# Patient Record
Sex: MALE | Race: WHITE | NOT HISPANIC OR LATINO | Employment: FULL TIME | URBAN - METROPOLITAN AREA
[De-identification: names, ages, dates, MRNs, and addresses within clinical notes are randomized per-mention and may not be internally consistent; named-entity substitution may affect disease eponyms.]

---

## 2017-01-03 ENCOUNTER — OFFICE VISIT (OUTPATIENT)
Dept: FAMILY MEDICINE | Facility: CLINIC | Age: 37
End: 2017-01-03
Payer: COMMERCIAL

## 2017-01-03 VITALS
TEMPERATURE: 98 F | SYSTOLIC BLOOD PRESSURE: 130 MMHG | WEIGHT: 258 LBS | BODY MASS INDEX: 38.39 KG/M2 | DIASTOLIC BLOOD PRESSURE: 82 MMHG | HEART RATE: 82 BPM | OXYGEN SATURATION: 97 %

## 2017-01-03 DIAGNOSIS — F32.1 MAJOR DEPRESSIVE DISORDER, SINGLE EPISODE, MODERATE (H): Primary | ICD-10-CM

## 2017-01-03 DIAGNOSIS — Z13.220 SCREENING FOR CHOLESTEROL LEVEL: ICD-10-CM

## 2017-01-03 DIAGNOSIS — F52.32 ANORGASMIA OF MALE: ICD-10-CM

## 2017-01-03 PROCEDURE — 99213 OFFICE O/P EST LOW 20 MIN: CPT | Performed by: FAMILY MEDICINE

## 2017-01-03 RX ORDER — BUPROPION HYDROCHLORIDE 150 MG/1
150 TABLET ORAL EVERY MORNING
Qty: 30 TABLET | Refills: 1 | Status: SHIPPED | OUTPATIENT
Start: 2017-01-03 | End: 2017-03-06 | Stop reason: ALTCHOICE

## 2017-01-03 ASSESSMENT — ANXIETY QUESTIONNAIRES
GAD7 TOTAL SCORE: 7
7. FEELING AFRAID AS IF SOMETHING AWFUL MIGHT HAPPEN: SEVERAL DAYS
5. BEING SO RESTLESS THAT IT IS HARD TO SIT STILL: SEVERAL DAYS
1. FEELING NERVOUS, ANXIOUS, OR ON EDGE: SEVERAL DAYS
2. NOT BEING ABLE TO STOP OR CONTROL WORRYING: SEVERAL DAYS
6. BECOMING EASILY ANNOYED OR IRRITABLE: SEVERAL DAYS
3. WORRYING TOO MUCH ABOUT DIFFERENT THINGS: SEVERAL DAYS

## 2017-01-03 ASSESSMENT — PAIN SCALES - GENERAL: PAINLEVEL: NO PAIN (0)

## 2017-01-03 ASSESSMENT — PATIENT HEALTH QUESTIONNAIRE - PHQ9: 5. POOR APPETITE OR OVEREATING: SEVERAL DAYS

## 2017-01-03 NOTE — MR AVS SNAPSHOT
"              After Visit Summary   1/3/2017    Torres Cuevas    MRN: [id number]           Patient Information     Date Of Birth          1980        Visit Information        Provider Department      1/3/2017 3:40 PM Chepe Palm MD [facility]        Today's Diagnoses     Major depressive disorder, single episode, moderate (H)    -  1     Anorgasmia of male         Screening for cholesterol level            Follow-ups after your visit        Future tests that were ordered for you today     Open Future Orders        Priority Expected Expires Ordered    Lipid panel reflex to direct LDL Routine 1/4/2017 2/3/2017 1/3/2017            Who to contact     If you have questions or need follow up information about today's clinic visit or your schedule please contact [facility] directly at [phone].  Normal or non-critical lab and imaging results will be communicated to you by MyChart, letter or phone within 4 business days after the clinic has received the results. If you do not hear from us within 7 days, please contact the clinic through MyChart or phone. If you have a critical or abnormal lab result, we will notify you by phone as soon as possible.  Submit refill requests through FaceTags or call your pharmacy and they will forward the refill request to us. Please allow 3 business days for your refill to be completed.          Additional Information About Your Visit        MyChart Information     FaceTags lets you send messages to your doctor, view your test results, renew your prescriptions, schedule appointments and more. To sign up, go to [email or web address] . Click on \"Log in\" on the left side of the screen, which will take you to the Welcome page. Then click on \"Sign up Now\" on the right side of the page.     You will be asked to enter the access code listed below, as well as some personal information. Please follow the directions to create your " username and password.     Your access code is: [email or web address]  Expires: 2017  5:22 PM     Your access code will  in 90 days. If you need help or a new code, please call your [facility] or [phone].        Care EveryWhere ID     This is your Care EveryWhere ID. This could be used by other organizations to access your [place] medical records  [id number]        Your Vitals Were     Pulse Temperature Pulse Oximetry             82 98  F (36.7  C) (Oral) 97%          Blood Pressure from Last 3 Encounters:   17 130/82   16 125/84   16 130/87    Weight from Last 3 Encounters:   17 258 lb (117.028 kg)   16 257 lb (116.574 kg)   16 257 lb (116.574 kg)                 Today's Medication Changes          These changes are accurate as of: 1/3/17  4:34 PM.  If you have any questions, ask your nurse or doctor.               Start taking these medicines.        Dose/Directions    buPROPion 150 MG 24 hr tablet   Commonly known as:  WELLBUTRIN XL   Used for:  Major depressive disorder, single episode, moderate (H)   Replaces:  sertraline 50 MG tablet   Started by:  Chepe Palm MD        Dose:  150 mg   Take 1 tablet (150 mg) by mouth every morning   Quantity:  30 tablet   Refills:  1         Stop taking these medicines if you haven't already. Please contact your care team if you have questions.     sertraline 50 MG tablet   Commonly known as:  ZOLOFT   Replaced by:  buPROPion 150 MG 24 hr tablet   Stopped by:  Chepe Palm MD                Where to get your medicines      These medications were sent to [facility]/pharmacy #[id number] - [place], [place] - [address] AT CORNER OF [address] [place] [address], [facility] [address]     Phone:  [phone]    - buPROPion 150 MG 24 hr tablet             Primary Care Provider    None Specified       No primary provider on file.        Thank you!     Thank you for choosing [facility]  for your care. Our  goal is always to provide you with excellent care. Hearing back from our patients is one way we can continue to improve our services. Please take a few minutes to complete the written survey that you may receive in the mail after your visit with us. Thank you!             Your Updated Medication List - Protect others around you: Learn how to safely use, store and throw away your medicines at www.disposemymeds.org.          This list is accurate as of: 1/3/17  4:34 PM.  Always use your most recent med list.                   Brand Name Dispense Instructions for use    ALLEGRA PO      Take 180 mg by mouth daily as needed for allergies       buPROPion 150 MG 24 hr tablet    WELLBUTRIN XL    30 tablet    Take 1 tablet (150 mg) by mouth every morning       fluticasone 50 MCG/ACT spray    FLONASE     Spray 1 spray into both nostrils daily as needed for rhinitis or allergies       SUDAFED PO      Take 60 mg by mouth daily as needed for congestion

## 2017-01-03 NOTE — NURSING NOTE
"Chief Complaint   Patient presents with     Depression     4 week follow up       Initial /82 mmHg  Pulse 82  Temp(Src) 98  F (36.7  C) (Oral)  Wt 258 lb (117.028 kg)  SpO2 97% Estimated body mass index is 38.39 kg/(m^2) as calculated from the following:    Height as of 11/14/16: 5' 8.75\" (1.746 m).    Weight as of this encounter: 258 lb (117.028 kg).  BP completed using cuff size: Vijaya Montesinos MA      "

## 2017-01-03 NOTE — PROGRESS NOTES
SUBJECTIVE:                                                    Torres Cuevas is a 36 year old male who presents to clinic today for the following health issues:    4 week depression follow up    Problem list and histories reviewed & adjusted, as indicated.    Patient states he is doing well   He does feel that his depression has improved   He has normal erections but cannot reach orgasm     This is new for the patient   He read that this could be a side effect of the medication     Past Medical History   Diagnosis Date     Seasonal allergies        Past Surgical History   Procedure Laterality Date     No surgeries         Family History   Problem Relation Age of Onset     Liver Disease Paternal Grandfather      alcohol related most likely     Myocardial Infarction Maternal Grandfather      in his 60s       Social History   Substance Use Topics     Smoking status: Never Smoker      Smokeless tobacco: Never Used     Alcohol Use: Yes      Comment: 4 per week         Current Outpatient Prescriptions on File Prior to Visit:  Fexofenadine HCl (ALLEGRA PO) Take 180 mg by mouth daily as needed for allergies   fluticasone (FLONASE) 50 MCG/ACT nasal spray Spray 1 spray into both nostrils daily as needed for rhinitis or allergies   Pseudoephedrine HCl (SUDAFED PO) Take 60 mg by mouth daily as needed for congestion   [DISCONTINUED] sertraline (ZOLOFT) 50 MG tablet Take 1 tablet (50 mg) by mouth daily     No current facility-administered medications on file prior to visit.    O: /82 mmHg  Pulse 82  Temp(Src) 98  F (36.7  C) (Oral)  Wt 258 lb (117.028 kg)  SpO2 97%    Chest wall normal to inspection and palpation. Good excursion bilaterally. Lungs clear to auscultation. Good air movement bilaterally without rales, wheezes, or rhonchi.   Regular rate and  rhythm. S1 and S2 normal, no murmurs, clicks, gallops or rubs. No edema or JVD.              ICD-10-CM    1. Major depressive disorder, single episode, moderate (H)  F32.1 buPROPion (WELLBUTRIN XL) 150 MG 24 hr tablet   2. Anorgasmia of male F52.32    3. Screening for cholesterol level Z13.220 Lipid panel reflex to direct LDL     We discussed that all meds for depression can have this problem   I had originally discussed adding the wellbutrin, but he only wants to be on one drug.   Generic serzone may be a possibility   The brand name has been discontinued, I believe for live problems, so I am somewhat concerned about trying the generic     Total time spent face-to-face with the patient: 15 minutes.  Greater than 50% of time was spent in counseling.

## 2017-01-04 ENCOUNTER — TRANSFERRED RECORDS (OUTPATIENT)
Dept: HEALTH INFORMATION MANAGEMENT | Facility: CLINIC | Age: 37
End: 2017-01-04

## 2017-01-04 ASSESSMENT — ANXIETY QUESTIONNAIRES: GAD7 TOTAL SCORE: 7

## 2017-01-04 ASSESSMENT — PATIENT HEALTH QUESTIONNAIRE - PHQ9: SUM OF ALL RESPONSES TO PHQ QUESTIONS 1-9: 8

## 2017-02-06 ENCOUNTER — OFFICE VISIT (OUTPATIENT)
Dept: FAMILY MEDICINE | Facility: CLINIC | Age: 37
End: 2017-02-06
Payer: COMMERCIAL

## 2017-02-06 VITALS
WEIGHT: 249 LBS | SYSTOLIC BLOOD PRESSURE: 120 MMHG | BODY MASS INDEX: 37.05 KG/M2 | DIASTOLIC BLOOD PRESSURE: 84 MMHG | HEART RATE: 72 BPM

## 2017-02-06 DIAGNOSIS — F43.21 ADJUSTMENT DISORDER WITH DEPRESSED MOOD: Primary | ICD-10-CM

## 2017-02-06 DIAGNOSIS — Z13.220 SCREENING FOR CHOLESTEROL LEVEL: ICD-10-CM

## 2017-02-06 LAB
CHOLEST SERPL-MCNC: 257 MG/DL
HDLC SERPL-MCNC: 46 MG/DL
LDLC SERPL CALC-MCNC: 192 MG/DL
NONHDLC SERPL-MCNC: 211 MG/DL
TRIGL SERPL-MCNC: 97 MG/DL

## 2017-02-06 PROCEDURE — 80061 LIPID PANEL: CPT | Performed by: FAMILY MEDICINE

## 2017-02-06 PROCEDURE — 36415 COLL VENOUS BLD VENIPUNCTURE: CPT | Performed by: FAMILY MEDICINE

## 2017-02-06 PROCEDURE — 99213 OFFICE O/P EST LOW 20 MIN: CPT | Performed by: FAMILY MEDICINE

## 2017-02-06 RX ORDER — ESCITALOPRAM OXALATE 10 MG/1
10 TABLET ORAL DAILY
Qty: 30 TABLET | Refills: 1 | Status: SHIPPED | OUTPATIENT
Start: 2017-02-06 | End: 2017-03-06

## 2017-02-06 NOTE — PROGRESS NOTES
SUBJECTIVE:                                                    Torres Cuevas is a 36 year old male who presents to clinic today for the following health issues:    Follow up depression    Patient stopped his wellbutrin   Got shaky and irritable       Problem list and histories reviewed & adjusted, as indicated.      Past Medical History   Diagnosis Date     Seasonal allergies        Past Surgical History   Procedure Laterality Date     No surgeries         Family History   Problem Relation Age of Onset     Liver Disease Paternal Grandfather      alcohol related most likely     Myocardial Infarction Maternal Grandfather      in his 60s       Social History   Substance Use Topics     Smoking status: Never Smoker     Smokeless tobacco: Never Used     Alcohol use Yes      Comment: 4 per week     Ros: no chest pain, chest tightness   No sob   No leg edema   No abdominal pain     Denies fever or chills   Weight stable     Reviewed his PHQ-9     PHQ-9 score:    PHQ-9 SCORE 1/3/2017   Total Score 8       ICD-10-CM    1. Adjustment disorder with depressed mood F43.21 escitalopram (LEXAPRO) 10 MG tablet     Recheck in 6 mos   P wants to go to see a therapist and does not want to add medications

## 2017-02-06 NOTE — LETTER
[facility]   [address] [place]  [address]  [phone]                                   February 13, 2017    Torres Cuevas  [address] [facility] [address]        Dear Torres,    Your cholesterols are quite high.     A low cholesterol diet involves decreasing red meat meals to twice a week.  It means trying to avoid fried foods an excessive use of oils.  Certain oils are better than others.  Olive oil is better for you.  Cutting back on dairy, cheeses etc. helps.      You should consider going on to a medication to lower your cholesterol if these recommendations do not help within 6 months. Consider repeat testing in 6 months.       Results for orders placed or performed in visit on 02/06/17   Lipid panel reflex to direct LDL   Result Value Ref Range    Cholesterol 257 (H) <200 mg/dL    Triglycerides 97 <150 mg/dL    HDL Cholesterol 46 >39 mg/dL    LDL Cholesterol Calculated 192 (H) <100 mg/dL    Non HDL Cholesterol 211 (H) <130 mg/dL       If you have any questions please call the clinic at [phone]    Sincerely,    Chepe Palm MD  bmd

## 2017-02-06 NOTE — MR AVS SNAPSHOT
"              After Visit Summary   2017    Torres Cuevas    MRN: [id number]           Patient Information     Date Of Birth          1980        Visit Information        Provider Department      2017 2:00 PM Chepe Palm MD [facility]        Today's Diagnoses     Adjustment disorder with depressed mood    -  1        Follow-ups after your visit        Who to contact     If you have questions or need follow up information about today's clinic visit or your schedule please contact [facility] directly at [phone].  Normal or non-critical lab and imaging results will be communicated to you by AnaptysBiohart, letter or phone within 4 business days after the clinic has received the results. If you do not hear from us within 7 days, please contact the clinic through AnaptysBiohart or phone. If you have a critical or abnormal lab result, we will notify you by phone as soon as possible.  Submit refill requests through Limecraft or call your pharmacy and they will forward the refill request to us. Please allow 3 business days for your refill to be completed.          Additional Information About Your Visit        MyChart Information     Limecraft lets you send messages to your doctor, view your test results, renew your prescriptions, schedule appointments and more. To sign up, go to [email or web address] . Click on \"Log in\" on the left side of the screen, which will take you to the Welcome page. Then click on \"Sign up Now\" on the right side of the page.     You will be asked to enter the access code listed below, as well as some personal information. Please follow the directions to create your username and password.     Your access code is: [email or web address]  Expires: 2017  5:22 PM     Your access code will  in 90 days. If you need help or a new code, please call your [facility] or [phone].        Care EveryWhere ID     This is your Care EveryWhere ID. " This could be used by other organizations to access your [place] medical records  [id number]        Your Vitals Were     Pulse                   72            Blood Pressure from Last 3 Encounters:   02/06/17 120/84   01/03/17 130/82   12/06/16 125/84    Weight from Last 3 Encounters:   02/06/17 249 lb (112.946 kg)   01/03/17 258 lb (117.028 kg)   12/06/16 257 lb (116.574 kg)              Today, you had the following     No orders found for display         Today's Medication Changes          These changes are accurate as of: 2/6/17  2:40 PM.  If you have any questions, ask your nurse or doctor.               Start taking these medicines.        Dose/Directions    escitalopram 10 MG tablet   Commonly known as:  LEXAPRO   Used for:  Adjustment disorder with depressed mood   Started by:  Chepe Palm MD        Dose:  10 mg   Take 1 tablet (10 mg) by mouth daily   Quantity:  30 tablet   Refills:  1            Where to get your medicines      These medications were sent to [facility]/pharmacy #[id number] - [address] AT CORNER OF [address] [place] [address]     Phone:  [phone]    - escitalopram 10 MG tablet             Primary Care Provider    None Specified       No primary provider on file.        Thank you!     Thank you for choosing [facility]  for your care. Our goal is always to provide you with excellent care. Hearing back from our patients is one way we can continue to improve our services. Please take a few minutes to complete the written survey that you may receive in the mail after your visit with us. Thank you!             Your Updated Medication List - Protect others around you: Learn how to safely use, store and throw away your medicines at www.disposemymeds.org.          This list is accurate as of: 2/6/17  2:40 PM.  Always use your most recent med list.                   Brand Name Dispense Instructions for use    ALLEGRA PO      Take 180  mg by mouth daily as needed for allergies       buPROPion 150 MG 24 hr tablet    WELLBUTRIN XL    30 tablet    Take 1 tablet (150 mg) by mouth every morning       escitalopram 10 MG tablet    LEXAPRO    30 tablet    Take 1 tablet (10 mg) by mouth daily       fluticasone 50 MCG/ACT spray    FLONASE     Spray 1 spray into both nostrils daily as needed for rhinitis or allergies       SUDAFED PO      Take 60 mg by mouth daily as needed for congestion

## 2017-02-13 NOTE — PROGRESS NOTES
Your cholesterols are quite high.     A low cholesterol diet involves decreasing red meat meals to twice a week.  It means trying to avoid fried foods an excessive use of oils.  Certain oils are better than others.  Olive oil is better for you.  Cutting back on dairy, cheeses etc. helps.      You should consider going on to a medication to lower your cholesterol if these recommendations do not help within 6 months. Consider repeat testing in 6 months.

## 2017-03-06 ENCOUNTER — VIRTUAL VISIT (OUTPATIENT)
Dept: FAMILY MEDICINE | Facility: CLINIC | Age: 37
End: 2017-03-06
Payer: COMMERCIAL

## 2017-03-06 DIAGNOSIS — J31.0 CHRONIC RHINITIS: Primary | ICD-10-CM

## 2017-03-06 DIAGNOSIS — F43.21 ADJUSTMENT DISORDER WITH DEPRESSED MOOD: ICD-10-CM

## 2017-03-06 DIAGNOSIS — F32.5 MAJOR DEPRESSION IN COMPLETE REMISSION (H): ICD-10-CM

## 2017-03-06 PROCEDURE — 99441 ZZC PHYSICIAN TELEPHONE EVALUATION 5-10 MIN: CPT | Performed by: FAMILY MEDICINE

## 2017-03-06 RX ORDER — FLUTICASONE PROPIONATE 50 MCG
1-2 SPRAY, SUSPENSION (ML) NASAL DAILY
Qty: 1 BOTTLE | Refills: 11 | Status: SHIPPED | OUTPATIENT
Start: 2017-03-06 | End: 2023-01-10

## 2017-03-06 RX ORDER — ESCITALOPRAM OXALATE 10 MG/1
10 TABLET ORAL DAILY
Qty: 30 TABLET | Refills: 5 | Status: SHIPPED | OUTPATIENT
Start: 2017-03-06 | End: 2017-07-10

## 2017-03-06 NOTE — MR AVS SNAPSHOT
"              After Visit Summary   3/6/2017    Torres Cuevas    MRN: [id number]           Patient Information     Date Of Birth          1980        Visit Information        Provider Department      3/6/2017 3:40 PM Chepe Palm MD [facility]        Today's Diagnoses     Chronic rhinitis    -  1    Major depression in complete remission (H)        Adjustment disorder with depressed mood           Follow-ups after your visit        Who to contact     If you have questions or need follow up information about today's clinic visit or your schedule please contact [facility] directly at [phone].  Normal or non-critical lab and imaging results will be communicated to you by MyChart, letter or phone within 4 business days after the clinic has received the results. If you do not hear from us within 7 days, please contact the clinic through aitainmenthart or phone. If you have a critical or abnormal lab result, we will notify you by phone as soon as possible.  Submit refill requests through Quoteroller or call your pharmacy and they will forward the refill request to us. Please allow 3 business days for your refill to be completed.          Additional Information About Your Visit        MyChart Information     Quoteroller lets you send messages to your doctor, view your test results, renew your prescriptions, schedule appointments and more. To sign up, go to [email or web address] . Click on \"Log in\" on the left side of the screen, which will take you to the Welcome page. Then click on \"Sign up Now\" on the right side of the page.     You will be asked to enter the access code listed below, as well as some personal information. Please follow the directions to create your username and password.     Your access code is: [email or web address]  Expires: 2017  4:10 PM     Your access code will  in 90 days. If you need help or a new code, please call your [facility] or " [phone].        Care EveryWhere ID     This is your Care EveryWhere ID. This could be used by other organizations to access your [place] medical records  [id number]         Blood Pressure from Last 3 Encounters:   02/06/17 120/84   01/03/17 130/82   12/06/16 125/84    Weight from Last 3 Encounters:   02/06/17 249 lb (112.9 kg)   01/03/17 258 lb (117 kg)   12/06/16 257 lb (116.6 kg)              Today, you had the following     No orders found for display         Today's Medication Changes          These changes are accurate as of: 3/6/17  4:10 PM.  If you have any questions, ask your nurse or doctor.               These medicines have changed or have updated prescriptions.        Dose/Directions    * fluticasone 50 MCG/ACT spray   Commonly known as:  FLONASE   This may have changed:  Another medication with the same name was added. Make sure you understand how and when to take each.   Changed by:  Chepe Palm MD        Dose:  1 spray   Spray 1 spray into both nostrils daily as needed for rhinitis or allergies   Refills:  0       * fluticasone 50 MCG/ACT spray   Commonly known as:  FLONASE   This may have changed:  You were already taking a medication with the same name, and this prescription was added. Make sure you understand how and when to take each.   Used for:  Chronic rhinitis   Changed by:  Chepe Palm MD        Dose:  1-2 spray   Spray 1-2 sprays into both nostrils daily   Quantity:  1 Bottle   Refills:  11       * Notice:  This list has 2 medication(s) that are the same as other medications prescribed for you. Read the directions carefully, and ask your doctor or other care provider to review them with you.      Stop taking these medicines if you haven't already. Please contact your care team if you have questions.     buPROPion 150 MG 24 hr tablet   Commonly known as:  WELLBUTRIN XL   Stopped by:  Chepe Palm MD                Where to get your medicines      These  medications were sent to [facility]/pharmacy #[id number] - [place], [place] - [address] [facility], [facility] [address]     Phone:  [phone]     escitalopram 10 MG tablet    fluticasone 50 MCG/ACT spray                Primary Care Provider    None Specified       No primary provider on file.        Thank you!     Thank you for choosing [facility]  for your care. Our goal is always to provide you with excellent care. Hearing back from our patients is one way we can continue to improve our services. Please take a few minutes to complete the written survey that you may receive in the mail after your visit with us. Thank you!             Your Updated Medication List - Protect others around you: Learn how to safely use, store and throw away your medicines at www.disposemymeds.org.          This list is accurate as of: 3/6/17  4:10 PM.  Always use your most recent med list.                   Brand Name Dispense Instructions for use    ALLEGRA PO      Take 180 mg by mouth daily as needed for allergies       escitalopram 10 MG tablet    LEXAPRO    30 tablet    Take 1 tablet (10 mg) by mouth daily       * fluticasone 50 MCG/ACT spray    FLONASE     Spray 1 spray into both nostrils daily as needed for rhinitis or allergies       * fluticasone 50 MCG/ACT spray    FLONASE    1 Bottle    Spray 1-2 sprays into both nostrils daily       SUDAFED PO      Take 60 mg by mouth daily as needed for congestion       * Notice:  This list has 2 medication(s) that are the same as other medications prescribed for you. Read the directions carefully, and ask your doctor or other care provider to review them with you.

## 2017-03-06 NOTE — PROGRESS NOTES
"Torres Cuevas is a 36 year old male who is being evaluated via a telephone visit.      The patient has been notified of following:     \"This telephone visit will be conducted via a call between you and your physician/provider. We have found that certain health care needs can be provided without the need for a physical exam.  This service lets us provide the care you need with a short phone conversation.  If a prescription is necessary we can send it directly to your pharmacy.  If lab work is needed we can place an order for that and you can then stop by our lab to have the test done at a later time.    We will bill your insurance company for this service.  Please check with your medical insurance if this type of visit is covered. You may be responsible for the cost of this type of visit if insurance coverage is denied.  The typical cost is $30 (10min), $59(11-20min) and $85 (21-30min).  Most often these visits are shorter than 10 minutes.    If during the course of the call the physician/provider feels a telephone visit is not appropriate, you will not be charged for this service. \"     Consent has been obtained for this service by 1 care team member:yes. See the scanned image in the medical record.    Preferred patient phone number to be used for this call: [phone]     Torres Cuevas complains of follow up on depression.    Past Medical History   Diagnosis Date     Seasonal allergies       Social History     Social History     Marital status: Single     Spouse name: N/A     Number of children: N/A     Years of education: N/A     Occupational History     Not on file.     Social History Main Topics     Smoking status: Never Smoker     Smokeless tobacco: Never Used     Alcohol use Yes      Comment: 4 per week     Drug use: No     Sexual activity: Yes     Partners: Female     Other Topics Concern     Parent/Sibling W/ Cabg, Mi Or Angioplasty Before 65f 55m? No     Social History Narrative     ALLERGIES  Seasonal " allergies     Patient started on new med ; lexapro in the last month   Previous med was wellbutrin     PhQ-9 today is 2     No Side effects from meds :     Seems to be working     No sedation   He is not having trouble sleeping   He sleeps about 9 hours     He  Wakes up and he is not tired     Jitteriness has gone after the wellbutrin     Patient drinks alcohol 2 times a week   He is not having problems       ICD-10-CM    1. Chronic rhinitis J31.0 fluticasone (FLONASE) 50 MCG/ACT spray   2. Major depression in complete remission (H) F32.5    3. Adjustment disorder with depressed mood F43.21 escitalopram (LEXAPRO) 10 MG tablet       Patient is doing well off the wellbutrin and is in remission on lexapro   He has no side effects   He can contact us in 6 months and repeat his phq-9 over the phone and if this remains in remission then we can renew his meds for another 6 months.     Follow up up in the office in 1 year                 [facility]      Additional provider notes:    Assessment/Plan:  No diagnosis found.    Total time spent on this phone visit with the patient = 5 minutes     I have reviewed the note as documented above.  This accurately captures the substance of my conversation with the patient,        No vitals were done due to Telephone visit.

## 2017-03-07 ASSESSMENT — PATIENT HEALTH QUESTIONNAIRE - PHQ9: SUM OF ALL RESPONSES TO PHQ QUESTIONS 1-9: 2

## 2017-06-05 ENCOUNTER — TELEPHONE (OUTPATIENT)
Dept: FAMILY MEDICINE | Facility: CLINIC | Age: 37
End: 2017-06-05

## 2017-06-05 DIAGNOSIS — F32.4 MAJOR DEPRESSIVE DISORDER WITH SINGLE EPISODE, IN PARTIAL REMISSION (H): Primary | ICD-10-CM

## 2017-06-05 NOTE — LETTER
My Depression Action Plan  Name: Torres Cuevas   Date of Birth 1980  Date: 7/10/2017    My doctor: No primary care provider on file.   My clinic: [address] [id number] [phone]          GREEN    ZONE   Good Control    What it looks like:     Things are going generally well. You have normal up s and down s. You may even feel depressed from time to time, but bad moods usually last less than a day.   What you need to do:  1. Continue to care for yourself (see self care plan)  2. Check your depression survival kit and update it as needed  3. Follow your physician s recommendations including any medication.  4. Do not stop taking medication unless you consult with your physician first.           YELLOW         ZONE Getting Worse    What it looks like:     Depression is starting to interfere with your life.     It may be hard to get out of bed; you may be starting to isolate yourself from others.    Symptoms of depression are starting to last most all day and this has happened for several days.     You may have suicidal thoughts but they are not constant.   What you need to do:     1. Call your care team, your response to treatment will improve if you keep your care team informed of your progress. Yellow periods are signs an adjustment may need to be made.     2. Continue your self-care, even if you have to fake it!    3. Talk to someone in your support network    4. Open up your depression survival kit           RED    ZONE Medical Alert - Get Help    What it looks like:     Depression is seriously interfering with your life.     You may experience these or other symptoms: You can t get out of bed most days, can t work or engage in other necessary activities, you have trouble taking care of basic hygiene, or basic responsibilities, thoughts of suicide or death that will not go away, self-injurious behavior.     What you  need to do:  1. Call your care team and request a same-day appointment. If they are not available (weekends or after hours) call your local crisis line, emergency room or 911.      Electronically signed by: Yoselin Chamberlain, July 10, 2017    Depression Self Care Plan / Survival Kit    Self-Care for Depression  Here s the deal. Your body and mind are really not as separate as most people think.  What you do and think affects how you feel and how you feel influences what you do and think. This means if you do things that people who feel good do, it will help you feel better.  Sometimes this is all it takes.  There is also a place for medication and therapy depending on how severe your depression is, so be sure to consult with your medical provider and/ or Behavioral Health Consultant if your symptoms are worsening or not improving.     In order to better manage my stress, I will:    Exercise  Get some form of exercise, every day. This will help reduce pain and release endorphins, the  feel good  chemicals in your brain. This is almost as good as taking antidepressants!  This is not the same as joining a gym and then never going! (they count on that by the way ) It can be as simple as just going for a walk or doing some gardening, anything that will get you moving.      Hygiene   Maintain good hygiene (Get out of bed in the morning, Make your bed, Brush your teeth, Take a shower, and Get dressed like you were going to work, even if you are unemployed).  If your clothes don't fit try to get ones that do.    Diet  I will strive to eat foods that are good for me, drink plenty of water, and avoid excessive sugar, caffeine, alcohol, and other mood-altering substances.  Some foods that are helpful in depression are: complex carbohydrates, B vitamins, flaxseed, fish or fish oil, fresh fruits and vegetables.    Psychotherapy  I agree to participate in Individual Therapy (if recommended).    Medication  If prescribed medications,  I agree to take them.  Missing doses can result in serious side effects.  I understand that drinking alcohol, or other illicit drug use, may cause potential side effects.  I will not stop my medication abruptly without first discussing it with my provider.    Staying Connected With Others  I will stay in touch with my friends, family members, and my primary care provider/team.    Use your imagination  Be creative.  We all have a creative side; it doesn t matter if it s oil painting, sand castles, or mud pies! This will also kick up the endorphins.    Witness Beauty  (AKA stop and smell the roses) Take a look outside, even in mid-winter. Notice colors, textures. Watch the squirrels and birds.     Service to others  Be of service to others.  There is always someone else in need.  By helping others we can  get out of ourselves  and remember the really important things.  This also provides opportunities for practicing all the other parts of the program.    Humor  Laugh and be silly!  Adjust your TV habits for less news and crime-drama and more comedy.    Control your stress  Try breathing deep, massage therapy, biofeedback, and meditation. Find time to relax each day.     My support system    Clinic Contact:  Phone number:    Contact 1:  Phone number:    Contact 2:  Phone number:    Mu-ism/:  Phone number:    Therapist:  Phone number:    Local crisis center:    Phone number:    Other community support:  Phone number:

## 2017-06-05 NOTE — LETTER
June 5, 2017    Torres Cuevas  [address]  [place] [address]    Dear Torres    We care about your health and have reviewed your health plan. We have reviewed your medical conditions, medication list, and lab results and are making recommendations based on this review, to better manage your health.    You are in particular need of attention regarding:  - Your Depression      Here is a list of Health Maintenance topics that are due now or due soon:  Health Maintenance Due   Topic Date Due     DEPRESSION ACTION PLAN Q1 YR  1980     We will be calling you in the next couple of weeks to help you schedule any appointments that are needed.  Please call us at [phone] (or use [employer]) to address the above recommendations.     Thank you for trusting [facility] and we appreciate the opportunity to serve you.  We look forward to supporting your healthcare needs in the future.    Healthy Regards,    Your [facility] Care Team/nr

## 2017-06-05 NOTE — TELEPHONE ENCOUNTER
Panel Management Review      Patient has the following on his problem list:     Depression / Dysthymia review  PHQ-9 SCORE 12/6/2016 1/3/2017 3/6/2017   Total Score 7 8 2      Patient is due for:  DAP      Composite cancer screening  Chart review shows that this patient is due/due soon for the following None  Summary:    Patient is due/failing the following:   DAP and FOLLOW UP    Action needed:   Patient needs office visit for mood. and DAP    Type of outreach:    Phone, left message for patient to call back.  and Sent letter.    Questions for provider review:    None                                                                                                                                    Yoselin Chamberlain [facility]        Chart routed to Care Team .

## 2017-07-10 DIAGNOSIS — F43.21 ADJUSTMENT DISORDER WITH DEPRESSED MOOD: ICD-10-CM

## 2017-07-10 NOTE — TELEPHONE ENCOUNTER
escitalopram (LEXAPRO) 10 MG tablet     Last Written Prescription Date: 3-6-17  Last Fill Quantity: 30, # refills: 5  Last Office Visit with G primary care provider:  3-6-17        Last PHQ-9 score on record=   PHQ-9 SCORE 7/10/2017   Total Score 5

## 2017-07-10 NOTE — TELEPHONE ENCOUNTER
Spoke with the patient and updated hi PHQ-9 as per the follow up instructions from Dr. Palm. Sent DAP to patient and routed to Dr. CAGLE as a FYI. Patient also requested a refill of his medication and that was t'ed up and sent to the provider as well. No other questions from the patient at this time.

## 2017-07-11 RX ORDER — ESCITALOPRAM OXALATE 10 MG/1
10 TABLET ORAL DAILY
Qty: 30 TABLET | Refills: 5 | Status: SHIPPED | OUTPATIENT
Start: 2017-07-11 | End: 2017-10-27

## 2017-07-11 ASSESSMENT — PATIENT HEALTH QUESTIONNAIRE - PHQ9: SUM OF ALL RESPONSES TO PHQ QUESTIONS 1-9: 5

## 2017-07-11 NOTE — TELEPHONE ENCOUNTER
Prescription approved per [facility] Refill Protocol.    Criss Lora RN  [facility]

## 2017-10-27 ENCOUNTER — TELEPHONE (OUTPATIENT)
Dept: FAMILY MEDICINE | Facility: CLINIC | Age: 37
End: 2017-10-27

## 2017-10-27 DIAGNOSIS — F43.21 ADJUSTMENT DISORDER WITH DEPRESSED MOOD: ICD-10-CM

## 2017-10-27 RX ORDER — ESCITALOPRAM OXALATE 20 MG/1
20 TABLET ORAL DAILY
Qty: 30 TABLET | Refills: 1 | Status: SHIPPED | OUTPATIENT
Start: 2017-10-27 | End: 2018-01-03

## 2017-10-27 ASSESSMENT — PATIENT HEALTH QUESTIONNAIRE - PHQ9: SUM OF ALL RESPONSES TO PHQ QUESTIONS 1-9: 11

## 2017-10-27 NOTE — TELEPHONE ENCOUNTER
I have increased the dose to 20 mg per patient wishes   Please document a current PHQ9 in the chart

## 2017-10-27 NOTE — TELEPHONE ENCOUNTER
"Called and spoke with patient.  About a couple weeks ago he was feeling \"pretty yoselyn\" and took one double dose to see if it would improve his mood and it did - the 20mg is working better than the 10mg did.  He's wondering if it would be d/t him being a \"bigger renate.\"      No thoughts of harming self/others.    Would provider be willing to increase to 20mg?    Routed to provider.    Criss Lora RN  [facility]        "

## 2017-10-27 NOTE — TELEPHONE ENCOUNTER
Reason for Call:  Other prescription    Detailed comments: Patient is wanting to discuss doubling his dose of escitalopram (LEXAPRO) 10 MG tablet. Please call to let him know if he can do this without an appointment.    Phone Number Patient can be reached at: Home number on file [phone] (home)    Best Time: anytime    Can we leave a detailed message on this number? YES    Call taken on 10/27/2017 at 3:51 PM by Jonathan Killian

## 2017-10-27 NOTE — TELEPHONE ENCOUNTER
Called and spoke with patient, completed PHQ-9.    PHQ-9 SCORE 3/6/2017 7/10/2017 10/27/2017   Total Score 2 5 11         He thinks the increase dose will help his symptoms.  RN advised if he has any issues to call back to clinic.    Criss Lora RN  [facility]

## 2018-01-03 ENCOUNTER — OFFICE VISIT (OUTPATIENT)
Dept: PSYCHOLOGY | Facility: CLINIC | Age: 38
End: 2018-01-03
Payer: COMMERCIAL

## 2018-01-03 ENCOUNTER — TELEPHONE (OUTPATIENT)
Dept: FAMILY MEDICINE | Facility: CLINIC | Age: 38
End: 2018-01-03

## 2018-01-03 DIAGNOSIS — F33.0 MAJOR DEPRESSIVE DISORDER, RECURRENT EPISODE, MILD WITH ANXIOUS DISTRESS (H): Primary | ICD-10-CM

## 2018-01-03 DIAGNOSIS — F43.21 ADJUSTMENT DISORDER WITH DEPRESSED MOOD: ICD-10-CM

## 2018-01-03 PROCEDURE — 90791 PSYCH DIAGNOSTIC EVALUATION: CPT | Performed by: SOCIAL WORKER

## 2018-01-03 ASSESSMENT — ANXIETY QUESTIONNAIRES
7. FEELING AFRAID AS IF SOMETHING AWFUL MIGHT HAPPEN: SEVERAL DAYS
1. FEELING NERVOUS, ANXIOUS, OR ON EDGE: SEVERAL DAYS
IF YOU CHECKED OFF ANY PROBLEMS ON THIS QUESTIONNAIRE, HOW DIFFICULT HAVE THESE PROBLEMS MADE IT FOR YOU TO DO YOUR WORK, TAKE CARE OF THINGS AT HOME, OR GET ALONG WITH OTHER PEOPLE: SOMEWHAT DIFFICULT
GAD7 TOTAL SCORE: 7
2. NOT BEING ABLE TO STOP OR CONTROL WORRYING: SEVERAL DAYS
3. WORRYING TOO MUCH ABOUT DIFFERENT THINGS: SEVERAL DAYS
5. BEING SO RESTLESS THAT IT IS HARD TO SIT STILL: NOT AT ALL
6. BECOMING EASILY ANNOYED OR IRRITABLE: SEVERAL DAYS

## 2018-01-03 ASSESSMENT — PATIENT HEALTH QUESTIONNAIRE - PHQ9
5. POOR APPETITE OR OVEREATING: MORE THAN HALF THE DAYS
SUM OF ALL RESPONSES TO PHQ QUESTIONS 1-9: 10

## 2018-01-03 NOTE — TELEPHONE ENCOUNTER
Requested Prescriptions   Pending Prescriptions Disp Refills     escitalopram (LEXAPRO) 20 MG tablet [Pharmacy Med Name: ESCITALOPRAM 20 MG TABLET] 30 tablet 1    Last Written Prescription Date:  10-27-17  Last Fill Quantity: 30,  # refills: 1   Last Office Visit with [facility], [facility] or [facility] prescribing provider:  2-6-17   Future Office Visit:      Sig: TAKE 1 TABLET (20 MG) BY MOUTH DAILY    SSRIs Protocol Failed    1/3/2018  1:00 AM       Failed - PHQ-9 score less than 5 in past 6 months    The PHQ-9 criteria is meant to fail. It requires a PHQ-9 score review         Failed - Recent (6 mo) or future visit with authorizing provider's specialty    Patient had office visit in the last 6 months or has a visit in the next 30 days with authorizing provider.  See chart review.            Passed - Recent or future visit with authorizing provider    Patient had office visit in the last year or has a visit in the next 30 days with authorizing provider.  See chart review.              Passed - Patient is age 18 or older

## 2018-01-03 NOTE — LETTER
Torres,    You are do for a depression screening.  Please call us at [phone] so we may complete this over the phone.    Zee Block RN [facility] Triage.

## 2018-01-03 NOTE — MR AVS SNAPSHOT
"                  MRN:[id number]                      After Visit Summary   1/3/2018    Torres Cuevas    MRN: [id number]           Visit Information        Provider Department      1/3/2018 3:00 PM Hermilo Borja [facility] Generic      Your next 10 appointments already scheduled     2018  8:30 AM CST   [facility]    [address] [place] [id number]   [phone]            2018  1:00 PM CST   Return Visit with Hermilo Borja [facility]    [address] [facility] [address]   [phone]              MyChart Information     [employer] lets you send messages to your doctor, view your test results, renew your prescriptions, schedule appointments and more. To sign up, go to [email or web address] . Click on \"Log in\" on the left side of the screen, which will take you to the Welcome page. Then click on \"Sign up Now\" on the right side of the page.     You will be asked to enter the access code listed below, as well as some personal information. Please follow the directions to create your username and password.     Your access code is: [email or web address]  Expires: 2018  2:32 PM     Your access code will  in 90 days. If you need help or a new code, please call your [place] clinic or [phone].        Care EveryWhere ID     This is your Care EveryWhere ID. This could be used by other organizations to access your [place] medical records  [id number]        Equal Access to Services     RAMESH MANLEY : Hadii jeison Andrew, waaxda lurodney, qaybterrence kaalchester cole, olga corea. So [facility] [phone].    ATENCIÓN: Si habla español, tiene a camacho disposición servicios gratuitos de asistencia lingüística. Llame al " [phone].    We comply with applicable federal civil rights laws and [place] laws. We do not discriminate on the basis of race, color, national origin, age, disability, sex, sexual orientation, or gender identity.

## 2018-01-03 NOTE — PROGRESS NOTES
Adult Intake Structured Interview  Standard Diagnostic Assessment      CLIENT'S NAME: Torres Cuevas  MRN:   [id number]  :   1980  ACCT. NUMBER: [id number]  DATE OF SERVICE: 18      Identifying Information:  Client is a 37 year old, ,  male. Client was referred for counseling by self. Client is currently employed full time. Client attended the session alone.       Client's Statement of Presenting Concern:  Client reports the reason for seeking therapy at this time as increased anger and more emotional issues.  Have relationship issues in marriage.  Increased depressive symptoms and anxiety.   Client stated that his symptoms have resulted in the following functional impairments: relationship(s), self-care and social interactions      History of Presenting Concern:  Client reports that these problem(s) began more intensely in last two month. Client has attempted to resolve these concerns in the past through counseling and medications. Client reports that other professional(s) are not involved in providing support / services.     Social History:  Client reported he grew up in [place], [place]. They were the third born of 3 children. This is an intact family and parents remain . Client reported that his childhood was good. Some difficulty as a child and in middle school with peer relationships and he stated that he was an unusual child and liked weird things but got better in high school.   Client described his current relationships with family of origin as close to one sister and strained currently with oldest sibling.     Client reported a history of 1 committed relationships or marriages. Client has been  for five months but together with partner for 5 years. Client reported having 0 children. Client  identified some stable and meaningful social connections. Client reported that he has not been involved with the legal system.  Client's highest education level was some college. Client did identify the following learning problems: concentration and listening skills. There are no ethnic, cultural or Mormon factors that may be relevant for therapy. Client identified his preferred language to be English. Client reported he does not need the assistance of an  or other support involved in therapy. Modifications will not be used to assist communication in therapy. Client did not serve in the .     Client reports family history includes Liver Disease in his paternal grandfather; Myocardial Infarction in his maternal grandfather.    Mental Health History:  Client reported no family history of mental health issues.  Client previously received the following mental health diagnosis: Anxiety and Depression.  Client has received the following mental health services in the past: counseling and medication(s) from physician / PCP.  Hospitalizations: None.  Client is not currently receiving any mental health services.      Chemical Health History:  Client reported the following biological family members or relatives with chemical health issues: Aunt reportedly used alcohol , Maternal Grandfather reportedly used alcohol , Uncle reportedly used alcohol . Client has not received chemical dependency treatment in the past. Client is not currently receiving any chemical dependency treatment. Client reports no problems as a result of their drinking / drug use.      Client Reports:  Client reports using alcohol 3 times per week and has 2 beers and glasses of wine at a time. Client first started drinking at age 22.  Client denies using tobacco.  Client reports using marijuana 1 times per week and smokes 1 at a time. Client started using marijuana at age 35.  Client reports using caffeine 2 times per day and drinks 1 at  a time. Client started using caffeine at age 22.  Client denies using street drugs.  Client denies the non-medical use of prescription or over the counter drugs.    CAGE: None of the patient's responses to the CAGE screening were positive / Negative CAGE score   Based on the negative Cage-Aid score and clinical interview there  are not indications of drug or alcohol abuse.    Discussed the general effects of drugs and alcohol on health and well-being. Therapist gave client printed information about the effects of chemical use on his health and well being.      Significant Losses / Trauma / Abuse / Neglect Issues:  There are indications or report of significant loss, trauma, abuse or neglect issues related to: death of close friend when 26 years old in 2007.  Friend was killed by a drunk .    Issues of possible neglect are not present.      Medical Issues:  Client has had a physical exam to rule out medical causes for current symptoms. Date of last physical exam was within the past year. Client was encouraged to follow up with PCP if symptoms were to develop. The client does not have an established [facility] Care Provider.  He was encouraged to establish one at the [facility].. The client reports not having a psychiatrist. Client reports no current medical concerns. The client denies the presence of chronic or episodic pain. There are not significant nutritional concerns. Client would like to lose weight and will at times emotionally eat when stressed or emotional.    Client reports current meds as:   Current Outpatient Prescriptions   Medication Sig     escitalopram (LEXAPRO) 20 MG tablet Take 1 tablet (20 mg) by mouth daily     fluticasone (FLONASE) 50 MCG/ACT spray Spray 1-2 sprays into both nostrils daily     Fexofenadine HCl (ALLEGRA PO) Take 180 mg by mouth daily as needed for allergies     fluticasone (FLONASE) 50 MCG/ACT nasal spray Spray 1 spray into both nostrils daily as  needed for rhinitis or allergies     Pseudoephedrine HCl (SUDAFED PO) Take 60 mg by mouth daily as needed for congestion     [DISCONTINUED] sertraline (ZOLOFT) 50 MG tablet Take 1 tablet (50 mg) by mouth daily     No current facility-administered medications for this visit.        Client Allergies:  Allergies   Allergen Reactions     Seasonal Allergies      no known allergies to medications    Medical History:  Past Medical History:   Diagnosis Date     Seasonal allergies          Medication Adherence:  Client reports taking prescribed medications as prescribed.    Client was provided recommendation to follow-up with prescribing physician.    Mental Status Assessment:  Appearance:   Appropriate   Eye Contact:   Good   Psychomotor Behavior: Restless   Attitude:   Cooperative   Orientation:   All  Speech   Rate / Production: Normal    Volume:  Normal   Mood:    Anxious  Depressed  Sad   Affect:    Expansive  Worrisome   Thought Content:  Referential Thinking   Thought Form:  Coherent  Logical  Circumstantial  Insight:    Fair       Review of Symptoms:  Depression: Sleep Interest Guilt Energy Concentration Appetite Psychomotor slowing or agitation Irritability  Katherine:  No symptoms  Psychosis: No symptoms  Anxiety: Worries Nervousness Describe: about relationship and regulating emotions   Panic:  No symptoms  Post Traumatic Stress Disorder: No symptoms  Obsessive Compulsive Disorder: No symptoms  Eating Disorder: No symptoms  Oppositional Defiant Disorder: Lose Temper Blaming Angry  ADD / ADHD: Attention Listening  Conduct Disorder: No symptoms      Safety Assessment:    History of Safety Concerns:   Client denied a history of suicidal ideation.    Client denied a history of suicide attempts.    Client denied a history of homicidal ideation.    Client denied a history of self-injurious ideation and behaviors.    Client denied a history of personal safety concerns.    Client denied a history of assaultive behaviors.         Current Safety Concerns:  Client denies current suicidal ideation.    Client denies current homicidal ideation and behaviors.  Client denies current self-injurious ideation and behaviors.    Client denies current concerns for personal safety.      Client reports there are no firearms in the house.     Plan for Safety and Risk Management:  A safety and risk management plan has not been developed at this time, however client was given the after-hours number / 911 should there be a change in any of these risk factors.    Client's Strengths and Limitations:  Client identified the following strengths or resources that will help him succeed in counseling: commitment to health and well being, friends / good social support, family support and sense of humor. Client identified the following supports: family and friends. Things that may interfere with the client's success in counseling include: lack of commitment.      Diagnostic Criteria:  A) Recurrent episode(s) - symptoms have been present during the same 2-week period and represent a change from previous functioning 5 or more symptoms (required for diagnosis)   - Depressed mood. Note: In children and adolescents, can be irritable mood.     - Diminished interest or pleasure in all, or almost all, activities.    - Increased sleep.    - Psychomotor activity agitation.    - Fatigue or loss of energy.    - Feelings of worthlessness or inappropriate and excessive guilt.    - Diminished ability to think or concentrate, or indecisiveness.   B) The symptoms cause clinically significant distress or impairment in social, occupational, or other important areas of functioning  C) The episode is not attributable to the physiological effects of a substance or to another medical condition  D) The occurence of major depressive episode is not better explained by other thought / psychotic disorders  E) There has never been a manic episode or hypomanic episode      Functional  Status:  Client's symptoms have caused reduced functional status in the following areas: Activities of Daily Living - completion of daily tasks  Social / Relational - relationship issues with partner      DSM5 Diagnoses: (Sustained by DSM5 Criteria Listed Above)  Diagnoses: 296.31 (F33.0) Major Depressive Disorder, Recurrent Episode, Mild _ and With anxious distress  Psychosocial & Contextual Factors: relationship stress, little interest in doing things, anger regulation issues, financial stress  WHODAS 2.0 (12 item)            This questionnaire asks about difficulties due to health conditions. Health conditions  include  disease or illnesses, other health problems that may be short or long lasting,  injuries, mental health or emotional problems, and problems with alcohol or drugs.                     Think back over the past 30 days and answer these questions, thinking about how much  difficulty you had doing the following activities. For each question, please [employer] [place] only  one response.    S1 Standing for long periods such as 30 minutes? Mild =           2   S2 Taking care of household responsibilities? Moderate =   3   S3 Learning a new task, for example, learning how to get to a new place? Mild =           2   S4 How much of a problem do you have joining community activities (for example, festivals, [employer]ondo[employer] or other activities) in the same way as anyone else can? Mild =           2   S5 How much have you been emotionally affected by your health problems? Moderate =   3     In the past 30 days, how much difficulty did you have in:   S6 Concentrating on doing something for ten minutes? Moderate =   3   S7 Walking a long distance such as a kilometer (or equivalent)? Mild =           2   S8 Washing your whole body? Mild =           2   S9 Getting dressed? Mild =           2   S10 Dealing with people you do not know? Mild =           2   S11 Maintaining a friendship? Mild =           2   S12 Your day to day work?  Mild =           2     H1 Overall, in the past 30 days, how many days were these difficulties present? Record number of days 15   H2 In the past 30 days, for how many days were you totally unable to carry out your usual activities or work because of any health condition? Record number of days  9   H3 In the past 30 days, not counting the days that you were totally unable, for how many days did you cut back or reduce your usual activities or work because of any health condition? Record number of days 15     Attendance Agreement:  Client has signed Attendance Agreement:Yes      Collaboration:  Collaboration with other professionals is not indicated at this time. Client was asked to establish a PCP that he feels comfortable with so we can coordinate care in the future.       Preliminary Treatment Plan:  The client reports no currently identified Oriental orthodox, ethnic or cultural issues relevant to therapy.     services are not indicated.    Modifications to assist communication are not indicated.    The concerns identified by the client will be addressed in therapy.    Initial Treatment will focus on: Depressed Mood - Decrease depression symptoms and return to normal daily functioning  Anxiety - Alleviate anxiety and return to normal daily functioning.    As a preliminary treatment goal, client will experience a reduction in depressed mood, will develop more effective coping skills to manage depressive symptoms, will develop healthy cognitive patterns and beliefs, will increase ability to function adaptively and will continue to take medications as prescribed / participate in supportive activities and services  and will experience a reduction in anxiety, will develop more effective coping skills to manage anxiety symptoms, will develop healthy cognitive patterns and beliefs and will increase ability to function adaptively.    The focus of initial interventions will be to alleviate anxiety, alleviate depressed  mood, facilitate appropriate expression of feelings, increase coping skills, increase self esteem, provide homework to reinforce skill development, teach CBT skills, teach communication skills and anger regulation.    Referral to another professional/service is not indicated at this time..    A Release of Information is not needed at this time.    Report to child / adult protection services was NA.    Client will have access to their [facility]' medical record.    TORY Carcamo  January 3, 2018

## 2018-01-04 ASSESSMENT — ANXIETY QUESTIONNAIRES: GAD7 TOTAL SCORE: 7

## 2018-01-05 RX ORDER — ESCITALOPRAM OXALATE 20 MG/1
TABLET ORAL
Qty: 30 TABLET | Refills: 1 | Status: SHIPPED | OUTPATIENT
Start: 2018-01-05 | End: 2018-11-05

## 2018-01-05 NOTE — TELEPHONE ENCOUNTER
Routing refill request to provider for review/approval because:  Elevated PHQ-9 score > 5    Criss Lora RN  [facility]

## 2018-01-11 NOTE — TELEPHONE ENCOUNTER
Called patient at [phone] (home)  Left message on vociemail to return phone call to triage.  Zee Block RN [facility] Triage

## 2018-01-15 NOTE — TELEPHONE ENCOUNTER
Called patient at [phone] (home). Left message on voicemail to return phone call to triage.  Zee Block RN [facility] Triage.

## 2018-01-16 ENCOUNTER — OFFICE VISIT (OUTPATIENT)
Dept: PSYCHOLOGY | Facility: CLINIC | Age: 38
End: 2018-01-16
Payer: COMMERCIAL

## 2018-01-16 DIAGNOSIS — F33.0 MAJOR DEPRESSIVE DISORDER, RECURRENT EPISODE, MILD WITH ANXIOUS DISTRESS (H): Primary | ICD-10-CM

## 2018-01-16 PROCEDURE — 90834 PSYTX W PT 45 MINUTES: CPT | Performed by: SOCIAL WORKER

## 2018-01-16 NOTE — PROGRESS NOTES
"                                             Progress Note    Client Name: Torres Cuevas  Date: 1-16-18         Service Type: Individual      Session Start Time: 8:30  Session End Time: 9:15      Session Length: 45     Session #: 2     Attendees: Client    Treatment Plan Last Reviewed: Started tx plan today 1-16-18  PHQ-9 / KANDY-7 : Complete next session     DATA      Progress Since Last Session (Related to Symptoms / Goals / Homework):   Symptoms: Stable    Homework: Did not complete-Worked on tx plan in session today.  Client will engage in thought stopping process to develop awareness about when he starts to feel anger or irritability. Work on positive communication skills with wife, engage in positive communication sessions with her weekly and use \"I\" statements.  Continue to use mindfulness HOANG when client needs to regulate his mood.       Episode of Care Goals: Minimal progress - PREPARATION (Decided to change - considering how); Intervened by negotiating a change plan and determining options / strategies for behavior change, identifying triggers, exploring social supports, and working towards setting a date to begin behavior change     Current / Ongoing Stressors and Concerns:              relationship stress, little interest in doing things, anger regulation issues, financial stress      Treatment Objective(s) Addressed in This Session:   use thought-stopping strategy daily to reduce intrusive thoughts  Worked on treatment plan goals in session     Intervention:   Thought stopping process, effective communication skills         ASSESSMENT: Current Emotional / Mental Status (status of significant symptoms):   Risk status (Self / Other harm or suicidal ideation)   Client denies current fears or concerns for personal safety.   Client denies current or recent suicidal ideation or behaviors.   Client denies current or recent homicidal ideation or behaviors.   Client denies current or recent self injurious " "behavior or ideation.   Client denies other safety concerns.   A safety and risk management plan has not been developed at this time, however client was given the after-hours number / 911 should there be a change in any of these risk factors.     Appearance:   Appropriate    Eye Contact:   Good    Psychomotor Behavior: Normal    Attitude:   Cooperative    Orientation:   All   Speech    Rate / Production: Normal     Volume:  Normal    Mood:    Anxious  Depressed    Affect:    Appropriate  Bright  Expansive    Thought Content:  Referential Thinking    Thought Form:  Coherent  Goal Directed  Circumstantial   Insight:    Fair      Medication Review:   No changes to current psychiatric medication(s)     Medication Compliance:   Yes     Changes in Health Issues:   None reported     Chemical Use Review:   Substance Use: Chemical use reviewed, no active concerns identified      Tobacco Use: No current tobacco use.       Collateral Reports Completed:   Not Applicable    PLAN: (Client Tasks / Therapist Tasks / Other) Worked on tx plan in session today.  Client will engage in thought stopping process to develop awareness about when he starts to feel anger or irritability. Work on positive communication skills with wife, engage in positive communication sessions with her weekly and use \"I\" statements.  Continue to use mindfulness HOANG when client needs to regulate his mood. Scheduled additional counseling sessions.         ELIZABETH Carcamo                                                         ________________________________________________________________________    Treatment Plan    Client's Name: Torres Cuevas  YOB: 1980    Date: 1-16-18    DSM-V Diagnoses:  296.31 (F33.0) Major Depressive Disorder, Recurrent Episode, Mild _ and With anxious distress  Psychosocial & Contextual Factors: relationship stress, little interest in doing things, anger regulation issues, financial stress    WHODAS: Completed " first session    Referral / Collaboration:  Referral to another professional/service is not indicated at this time..    Anticipated number of session or this episode of care: 15      MeasurableTreatment Goal(s) related to diagnosis / functional impairment(s)  Goal 1: Client will decrease symptoms of depression and anxiety and return to normal daily functioning    I will know I've met my goal when I feel better about myself and my relationship and have less anger in my life.      Objective #A (Client Action)    Client will use cognitive strategies identified in therapy to challenge anxious thoughts and depressive thoughts.  Status: Continued - Date(s):1-16-18     Intervention(s)  Therapist will teach thought stopping process and CBT skills to regulate his mood.  Client will practice daily until it becomes automatic to regulate his mood better. .    Objective #B  Client will improve his overall self esteem and mood.  Status: Continued - Date(s):     Intervention(s)  Therapist will assign homework Complete self esteem schedule and work on areas that need improvement.  Client will concentrate on engaging in activities that improve his self esteem and write down what he accomplishes each day to improve his self esteem. .    Objective #C  Client will learn effective ways to regulate his anger..  Status: Continued - Date(s):     Intervention(s)  Therapist will assign homework Client will Learn the HEALS technique to regulate his anger daily or as needed. .        Client has reviewed and agreed to the above plan.      Hermilo Borja [facility]  January 16, 2018

## 2018-01-16 NOTE — TELEPHONE ENCOUNTER
Patient has not returned call to triage.  Mailed a letter to patient to call us so we may complete his PHQ 9 over the phone.    Zee Block RN [facility] Triage.

## 2018-01-16 NOTE — MR AVS SNAPSHOT
"                  MRN:[id number]                      After Visit Summary   2018    Torres Cuevas    MRN: [id number]           Visit Information        Provider Department      2018 8:30 AM BryanHermilo maldonado [facility] Generic      Your next 10 appointments already scheduled     2018  1:00 PM CST   Return Visit with Hermilo Borja [facility]    [address] [place] [id number]   [phone]            2018  1:00 PM CST   Return Visit with Hermilo Borja [facility]    [address] [facility] [address]   [phone]              MyChart Information     Cellcrypthart lets you send messages to your doctor, view your test results, renew your prescriptions, schedule appointments and more. To sign up, go to [email or web address] . Click on \"Log in\" on the left side of the screen, which will take you to the Welcome page. Then click on \"Sign up Now\" on the right side of the page.     You will be asked to enter the access code listed below, as well as some personal information. Please follow the directions to create your username and password.     Your access code is: [email or web address]  Expires: 2018  2:32 PM     Your access code will  in 90 days. If you need help or a new code, please call your [facility] [facility] clinic or [phone].        Care EveryWhere ID     This is your Care EveryWhere ID. This could be used by other organizations to access your [facility] [facility] medical records  [id number]        Equal Access to Services     RAMESH MANLEY : Hadii jeison velez Sodayna, waaxda luqadaha, qaybta kaalmada kelsey, olga corea. So [facility] [phone].    ATENCIÓN: Si habla español, tiene a camacho disposición servicios gratuitos de asistencia lingüística. Llame al " [phone].    We comply with applicable federal civil rights laws and [place] laws. We do not discriminate on the basis of race, color, national origin, age, disability, sex, sexual orientation, or gender identity.

## 2018-01-18 ENCOUNTER — TELEPHONE (OUTPATIENT)
Dept: FAMILY MEDICINE | Facility: CLINIC | Age: 38
End: 2018-01-18

## 2018-01-18 NOTE — TELEPHONE ENCOUNTER
Reason for Call:  Other     Detailed comments: patient is calling to talk with RN on team to do a depression screening.  Please call patient.    Phone Number Patient can be reached at: Home number on file [phone] (home)    Best Time: any    Can we leave a detailed message on this number? YES    Call taken on 1/18/2018 at 12:18 PM by Jalyn Granger

## 2018-01-18 NOTE — TELEPHONE ENCOUNTER
Left message for patient ([phone]) to call back to nurse line. Phone number provided.    Federico Smith RN

## 2018-01-19 NOTE — TELEPHONE ENCOUNTER
See provider plan from 1/3/18 phone encounter:    Chepe Palm MD       12:44 PM   Note      I have renewed his prescriptions, but his PHQ-9 numbers seem to be going up.  Make sure he is doing well with his depression, otherwise he may need to be rechecked.  If situational with the holidays or other stresses ok to wait to be seen.   Next visit he should be seen         PHQ-9 score:    PHQ-9 SCORE 1/3/2018   Total Score 10     So, patient does not need new PHQ9, just need to see if the elevated score earlier this month was more situational, is he doing better now?   Could repeat PHQ9 if patient agreeable.    Attempted to call patient at home number, left message on answering service requesting call back to clinic to discuss.  Teresa Galan RN  [facility]

## 2018-01-22 ASSESSMENT — PATIENT HEALTH QUESTIONNAIRE - PHQ9: SUM OF ALL RESPONSES TO PHQ QUESTIONS 1-9: 2

## 2018-01-22 NOTE — TELEPHONE ENCOUNTER
PHQ-9 score:    PHQ-9 SCORE 1/3/2018   Total Score 10           I called patient, he states he stopped his lexapro a couple weeks ago due to the sexual side effects.   He is seeing Hermilo for psych/talk therapy and thinks he is doing well.      Repeated phone phq9:    PHQ-9 score:    PHQ-9 SCORE 1/22/2018   Total Score 2       He states he is feeling more energetic, likes how things are going currently.    I advised it will be one year since last face to face visit in February.   He plans to continue with psych, will call to see PCP if wants to discuss meds again.    Routed to provider as TOMY.    Teresa Galan RN  [facility]

## 2018-01-22 NOTE — TELEPHONE ENCOUNTER
I have reviewed the note from the nurse.  OK to hold meds for now and just to with talk therapy.

## 2018-01-30 ENCOUNTER — OFFICE VISIT (OUTPATIENT)
Dept: PSYCHOLOGY | Facility: CLINIC | Age: 38
End: 2018-01-30
Payer: COMMERCIAL

## 2018-01-30 DIAGNOSIS — F33.0 MAJOR DEPRESSIVE DISORDER, RECURRENT EPISODE, MILD WITH ANXIOUS DISTRESS (H): Primary | ICD-10-CM

## 2018-01-30 PROCEDURE — 90834 PSYTX W PT 45 MINUTES: CPT | Performed by: SOCIAL WORKER

## 2018-01-30 ASSESSMENT — ANXIETY QUESTIONNAIRES
2. NOT BEING ABLE TO STOP OR CONTROL WORRYING: SEVERAL DAYS
5. BEING SO RESTLESS THAT IT IS HARD TO SIT STILL: SEVERAL DAYS
GAD7 TOTAL SCORE: 9
7. FEELING AFRAID AS IF SOMETHING AWFUL MIGHT HAPPEN: SEVERAL DAYS
1. FEELING NERVOUS, ANXIOUS, OR ON EDGE: SEVERAL DAYS
6. BECOMING EASILY ANNOYED OR IRRITABLE: MORE THAN HALF THE DAYS
3. WORRYING TOO MUCH ABOUT DIFFERENT THINGS: SEVERAL DAYS
IF YOU CHECKED OFF ANY PROBLEMS ON THIS QUESTIONNAIRE, HOW DIFFICULT HAVE THESE PROBLEMS MADE IT FOR YOU TO DO YOUR WORK, TAKE CARE OF THINGS AT HOME, OR GET ALONG WITH OTHER PEOPLE: SOMEWHAT DIFFICULT

## 2018-01-30 ASSESSMENT — PATIENT HEALTH QUESTIONNAIRE - PHQ9: 5. POOR APPETITE OR OVEREATING: MORE THAN HALF THE DAYS

## 2018-01-30 NOTE — MR AVS SNAPSHOT
"                  MRN:[id number]                      After Visit Summary   2018    Torres Cuevas    MRN: [id number]           Visit Information        Provider Department      2018 1:00 PM Hermilo Borja [facility] Generic      Your next 10 appointments already scheduled     2018  1:00 PM CST   Return Visit with Hermilo Borja [facility])    [address] [facility] [address]   [phone]            2018  4:00 PM CST   Return Visit with Hermilo Borja [facility] ([facility])    [address] [address]   [phone]              MyChart Information     [employer] lets you send messages to your doctor, view your test results, renew your prescriptions, schedule appointments and more. To sign up, go to [email or web address] . Click on \"Log in\" on the left side of the screen, which will take you to the Welcome page. Then click on \"Sign up Now\" on the right side of the page.     You will be asked to enter the access code listed below, as well as some personal information. Please follow the directions to create your username and password.     Your access code is: [email or web address]  Expires: 2018  2:32 PM     Your access code will  in 90 days. If you need help or a new code, please call your [place] clinic or [phone].        Care EveryWhere ID     This is your Care EveryWhere ID. This could be used by other organizations to access your [place] medical records  [id number]        Equal Access to Services     PRETTY MANLEY : Hadii aad sahra velez Sodayna, waaxda luqadaha, qaybta kaalmada adekevinyada, olga nelson . So [facility] [phone].    ATENCIÓN: Si habla español, tiene a camacho disposición servicios gratuitos de asistencia lingüística. Llame al " [phone].    We comply with applicable federal civil rights laws and [place] laws. We do not discriminate on the basis of race, color, national origin, age, disability, sex, sexual orientation, or gender identity.

## 2018-01-31 ASSESSMENT — ANXIETY QUESTIONNAIRES: GAD7 TOTAL SCORE: 9

## 2018-01-31 ASSESSMENT — PATIENT HEALTH QUESTIONNAIRE - PHQ9: SUM OF ALL RESPONSES TO PHQ QUESTIONS 1-9: 15

## 2018-01-31 NOTE — PROGRESS NOTES
"                                             Progress Note    Client Name: Torres Cuevas  Date: 1-30-18         Service Type: Individual      Session Start Time: 1:00  Session End Time: 1:45      Session Length: 45     Session #: 3     Attendees: Client    Treatment Plan Last Reviewed: Started tx plan today 1-16-18  PHQ-9 / KANDY-7 : Completed this session     DATA      Progress Since Last Session (Related to Symptoms / Goals / Homework):   Symptoms: Worsening PHQ9 and KANDY scores have increased    Homework: Did not complete-Worked on tx plan in session today.  Client will engage in thought stopping process to develop awareness about when he starts to feel anger or irritability. Work on positive communication skills with wife, engage in positive communication sessions with her weekly and use \"I\" statements.  Continue to use mindfulness HOANG when client needs to regulate his mood. Client will start documenting and journaling feelings and thoughts in a journal and concentrate on strengths and 3 positives or things that he accomplishes each day and what bring him edward.  Client having difficulty in his relationship and continuing to share with his wife how he feels and thinks about issues in his marriage.  Continue to engage in activities that distract him form negative feelings and thoughts.      Episode of Care Goals: Minimal progress - PREPARATION (Decided to change - considering how); Intervened by negotiating a change plan and determining options / strategies for behavior change, identifying triggers, exploring social supports, and working towards setting a date to begin behavior change     Current / Ongoing Stressors and Concerns:              relationship stress, little interest in doing things, anger regulation issues, financial stress      Treatment Objective(s) Addressed in This Session:   use thought-stopping strategy daily to reduce intrusive thoughts  Worked on treatment plan goals in session  Journal thoughts " "and feelings, strengths and accomplishments each day and what brings him edward and happiness each day   Intervention:   Thought stopping process, effective communication skills    Journaling, distraction activities, self care activities     ASSESSMENT: Current Emotional / Mental Status (status of significant symptoms):   Risk status (Self / Other harm or suicidal ideation)   Client denies current fears or concerns for personal safety.   Client denies current or recent suicidal ideation or behaviors.   Client denies current or recent homicidal ideation or behaviors.   Client denies current or recent self injurious behavior or ideation.   Client denies other safety concerns.   A safety and risk management plan has not been developed at this time, however client was given the after-hours number / 911 should there be a change in any of these risk factors.     Appearance:   Appropriate    Eye Contact:   Good    Psychomotor Behavior: Normal    Attitude:   Cooperative    Orientation:   All   Speech    Rate / Production: Normal     Volume:  Normal    Mood:    Anxious  Depressed    Affect:    Blunted  Flat  Subdued    Thought Content:  Referential Thinking    Thought Form:  Goal Directed  Circumstantial   Insight:    Fair      Medication Review:   No changes to current psychiatric medication(s)     Medication Compliance:   Yes     Changes in Health Issues:   None reported     Chemical Use Review:   Substance Use: Chemical use reviewed, no active concerns identified      Tobacco Use: No current tobacco use.       Collateral Reports Completed:   Not Applicable    PLAN: (Client Tasks / Therapist Tasks / Other) Worked on tx plan in session today.  Client will engage in thought stopping process to develop awareness about when he starts to feel anger or irritability. Work on positive communication skills with wife, engage in positive communication sessions with her weekly and use \"I\" statements.  Continue to use mindfulness HOANG when " client needs to regulate his mood. Scheduled additional counseling sessions. Client will start documenting and journaling feelings and thoughts in a journal and concentrate on strengths and 3 positives or things that he accomplishes each day and what bring him edward.  Client having difficulty in his relationship and continuing to share with his wife how he feels and thinks about issues in his marriage.  Continue to engage in activities that distract him form negative feelings and thoughts. Concentrate on good self care activities that improve his mood.           Hermilo Borja, [facility]                                                         ________________________________________________________________________    Treatment Plan    Client's Name: Torres Cuevas  YOB: 1980    Date: 1-16-18    DSM-V Diagnoses:  296.31 (F33.0) Major Depressive Disorder, Recurrent Episode, Mild _ and With anxious distress  Psychosocial & Contextual Factors: relationship stress, little interest in doing things, anger regulation issues, financial stress    WHODAS: Completed first session    Referral / Collaboration:  Referral to another professional/service is not indicated at this time..    Anticipated number of session or this episode of care: 15      MeasurableTreatment Goal(s) related to diagnosis / functional impairment(s)  Goal 1: Client will decrease symptoms of depression and anxiety and return to normal daily functioning    I will know I've met my goal when I feel better about myself and my relationship and have less anger in my life.      Objective #A (Client Action)    Client will use cognitive strategies identified in therapy to challenge anxious thoughts and depressive thoughts.  Status: Continued - Date(s):1-16-18     Intervention(s)  Therapist will teach thought stopping process and CBT skills to regulate his mood.  Client will practice daily until it becomes automatic to regulate his mood better.  .    Objective #B  Client will improve his overall self esteem and mood.  Status: Continued - Date(s):     Intervention(s)  Therapist will assign homework Complete self esteem schedule and work on areas that need improvement.  Client will concentrate on engaging in activities that improve his self esteem and write down what he accomplishes each day to improve his self esteem. .    Objective #C  Client will learn effective ways to regulate his anger..  Status: Continued - Date(s):     Intervention(s)  Therapist will assign homework Client will Learn the HEALS technique to regulate his anger daily or as needed. .        Client has reviewed and agreed to the above plan.      Hermilo Borja, [facility]  January 16, 2018

## 2018-02-13 ENCOUNTER — OFFICE VISIT (OUTPATIENT)
Dept: PSYCHOLOGY | Facility: CLINIC | Age: 38
End: 2018-02-13
Payer: COMMERCIAL

## 2018-02-13 DIAGNOSIS — F33.0 MAJOR DEPRESSIVE DISORDER, RECURRENT EPISODE, MILD WITH ANXIOUS DISTRESS (H): Primary | ICD-10-CM

## 2018-02-13 PROCEDURE — 90834 PSYTX W PT 45 MINUTES: CPT | Performed by: SOCIAL WORKER

## 2018-02-13 NOTE — MR AVS SNAPSHOT
"                  MRN:[id number]                      After Visit Summary   2018    Torres Cuevas    MRN: [id number]           Visit Information        Provider Department      2018 1:00 PM Hermilo Borja [facility] Generic      Your next 10 appointments already scheduled     2018  4:00 PM CST   Return Visit with Hermilo Borja [facility]    [address] [place] [address]   [phone]            Mar 15, 2018  2:30 PM CDT   Return Visit with Hermilo Borja [facility]    [address] [facility] [address]   [phone]              MyChart Information     [employer] lets you send messages to your doctor, view your test results, renew your prescriptions, schedule appointments and more. To sign up, go to [email or web address] . Click on \"Log in\" on the left side of the screen, which will take you to the Welcome page. Then click on \"Sign up Now\" on the right side of the page.     You will be asked to enter the access code listed below, as well as some personal information. Please follow the directions to create your username and password.     Your access code is: [email or web address]  Expires: 2018  2:32 PM     Your access code will  in 90 days. If you need help or a new code, please call your [place] clinic or [phone].        Care EveryWhere ID     This is your Care EveryWhere ID. This could be used by other organizations to access your [place] medical records  [id number]        Equal Access to Services     PRETTY MANLEY : Hadii aad sahra velez Sodayna, waaxda luqadaha, qaybta kaalmada adekevinyada, olga nelson . So [facility] [phone].    ATENCIÓN: Si habla español, tiene a camacho disposición servicios gratuitos de asistencia lingüística. Llame al " [phone].    We comply with applicable federal civil rights laws and [place] laws. We do not discriminate on the basis of race, color, national origin, age, disability, sex, sexual orientation, or gender identity.

## 2018-02-13 NOTE — PROGRESS NOTES
"                                             Progress Note    Client Name: Torres Cuevas  Date: 2-13-18         Service Type: Individual      Session Start Time: 1:00  Session End Time: 1:45      Session Length: 45     Session #: 4     Attendees: Client    Treatment Plan Last Reviewed: Started tx plan today 1-16-18  PHQ-9 / KANDY-7 : Complete next session     DATA      Progress Since Last Session (Related to Symptoms / Goals / Homework):   Symptoms: Stable    Homework: Achieved / completed to satisfactionPrevious sessions: Worked on tx plan in session today.  Client will engage in thought stopping process to develop awareness about when he starts to feel anger or irritability. Work on positive communication skills with wife, engage in positive communication sessions with her weekly and use \"I\" statements.  Continue to use mindfulness HOANG when client needs to regulate his mood. Client will start documenting and journaling feelings and thoughts in a journal and concentrate on strengths and 3 positives or things that he accomplishes each day and what bring him edward.  Client having difficulty in his relationship and continuing to share with his wife how he feels and thinks about issues in his marriage.  Continue to engage in activities that distract him form negative feelings and thoughts.Current session: Client is communicating better with his partner, they have scheduled couple's counseling.  Client is on a weight loss program and is feeling better about this and is also working on regulating his anger better and concentrating on positives about self and things that he accomplishes and journaling when he needs too.         Episode of Care Goals: Minimal progress - PREPARATION (Decided to change - considering how); Intervened by negotiating a change plan and determining options / strategies for behavior change, identifying triggers, exploring social supports, and working towards setting a date to begin behavior " change     Current / Ongoing Stressors and Concerns:              relationship stress, little interest in doing things, anger regulation issues, financial stress      Treatment Objective(s) Addressed in This Session:   use thought-stopping strategy daily to reduce intrusive thoughts  Worked on treatment plan goals in session  Journal thoughts and feelings, strengths and accomplishments each day and what brings him edward and happiness each day   Intervention:   Thought stopping process, effective communication skills    Journaling, distraction activities, self care activities  Anger regulation   ASSESSMENT: Current Emotional / Mental Status (status of significant symptoms):   Risk status (Self / Other harm or suicidal ideation)   Client denies current fears or concerns for personal safety.   Client denies current or recent suicidal ideation or behaviors.   Client denies current or recent homicidal ideation or behaviors.   Client denies current or recent self injurious behavior or ideation.   Client denies other safety concerns.   A safety and risk management plan has not been developed at this time, however client was given the after-hours number / 911 should there be a change in any of these risk factors.     Appearance:   Appropriate    Eye Contact:   Good    Psychomotor Behavior: Normal    Attitude:   Cooperative    Orientation:   All   Speech    Rate / Production: Normal     Volume:  Normal    Mood:    Depressed    Affect:    Blunted  Flat  Subdued    Thought Content:  Referential Thinking    Thought Form:  Goal Directed    Insight:    Fair      Medication Review:   No changes to current psychiatric medication(s)     Medication Compliance:   Yes     Changes in Health Issues:   None reported     Chemical Use Review:   Substance Use: Chemical use reviewed, no active concerns identified      Tobacco Use: No current tobacco use.       Collateral Reports Completed:   Not Applicable    PLAN: (Client Tasks / Therapist  "Tasks / Other) Past sessions: Worked on tx plan in session today.  Client will engage in thought stopping process to develop awareness about when he starts to feel anger or irritability. Work on positive communication skills with wife, engage in positive communication sessions with her weekly and use \"I\" statements.  Continue to use mindfulness HOANG when client needs to regulate his mood. Scheduled additional counseling sessions. Client will start documenting and journaling feelings and thoughts in a journal and concentrate on strengths and 3 positives or things that he accomplishes each day and what bring him edward.  Client having difficulty in his relationship and continuing to share with his wife how he feels and thinks about issues in his marriage.  Continue to engage in activities that distract him form negative feelings and thoughts. Concentrate on good self care activities that improve his mood.Current session:  Client is communicating better with his partner, they have scheduled couple's counseling.  Client is on a weight loss program and is feeling better about this and is also working on regulating his anger better and concentrating on positives about self and things that he accomplishes and journaling when he needs too.           Hermilo Borja, [facility]                                                         ________________________________________________________________________    Treatment Plan    Client's Name: Torres Cuevas  YOB: 1980    Date: 1-16-18    DSM-V Diagnoses:  296.31 (F33.0) Major Depressive Disorder, Recurrent Episode, Mild _ and With anxious distress  Psychosocial & Contextual Factors: relationship stress, little interest in doing things, anger regulation issues, financial stress    WHODAS: Completed first session    Referral / Collaboration:  Referral to another professional/service is not indicated at this time..    Anticipated number of session or this episode of care: " 15      MeasurableTreatment Goal(s) related to diagnosis / functional impairment(s)  Goal 1: Client will decrease symptoms of depression and anxiety and return to normal daily functioning    I will know I've met my goal when I feel better about myself and my relationship and have less anger in my life.      Objective #A (Client Action)    Client will use cognitive strategies identified in therapy to challenge anxious thoughts and depressive thoughts.  Status: Continued - Date(s):1-16-18     Intervention(s)  Therapist will teach thought stopping process and CBT skills to regulate his mood.  Client will practice daily until it becomes automatic to regulate his mood better. .    Objective #B  Client will improve his overall self esteem and mood.  Status: Continued - Date(s): 1-16-18    Intervention(s)  Therapist will assign homework Complete self esteem schedule and work on areas that need improvement.  Client will concentrate on engaging in activities that improve his self esteem and write down what he accomplishes each day to improve his self esteem. .    Objective #C  Client will learn effective ways to regulate his anger..  Status: Continued - Date(s): 1-16-18     Intervention(s)  Therapist will assign homework Client will Learn the HEALS technique to regulate his anger daily or as needed. .        Client has reviewed and agreed to the above plan.      Hermilo Borja, [facility]  January 16, 2018

## 2018-02-21 ENCOUNTER — TELEPHONE (OUTPATIENT)
Dept: FAMILY MEDICINE | Facility: CLINIC | Age: 38
End: 2018-02-21

## 2018-02-21 NOTE — TELEPHONE ENCOUNTER
Reason for Call:  Other     Detailed comments: Patient calling and is looking to do couples counseling with his wife. Patient is wondering if there is anyone that Hermilo recommends that they see.    Phone Number Patient can be reached at: Home number on file [phone] (home)    Best Time: any    Can we leave a detailed message on this number? YES    Call taken on 2/21/2018 at 12:40 PM by Celeste Israel

## 2018-02-27 ENCOUNTER — OFFICE VISIT (OUTPATIENT)
Dept: PSYCHOLOGY | Facility: CLINIC | Age: 38
End: 2018-02-27
Payer: COMMERCIAL

## 2018-02-27 DIAGNOSIS — F33.0 MAJOR DEPRESSIVE DISORDER, RECURRENT EPISODE, MILD WITH ANXIOUS DISTRESS (H): Primary | ICD-10-CM

## 2018-02-27 PROCEDURE — 90834 PSYTX W PT 45 MINUTES: CPT | Performed by: SOCIAL WORKER

## 2018-02-27 ASSESSMENT — ANXIETY QUESTIONNAIRES
1. FEELING NERVOUS, ANXIOUS, OR ON EDGE: SEVERAL DAYS
2. NOT BEING ABLE TO STOP OR CONTROL WORRYING: SEVERAL DAYS
5. BEING SO RESTLESS THAT IT IS HARD TO SIT STILL: NOT AT ALL
3. WORRYING TOO MUCH ABOUT DIFFERENT THINGS: SEVERAL DAYS
6. BECOMING EASILY ANNOYED OR IRRITABLE: NOT AT ALL
IF YOU CHECKED OFF ANY PROBLEMS ON THIS QUESTIONNAIRE, HOW DIFFICULT HAVE THESE PROBLEMS MADE IT FOR YOU TO DO YOUR WORK, TAKE CARE OF THINGS AT HOME, OR GET ALONG WITH OTHER PEOPLE: NOT DIFFICULT AT ALL
7. FEELING AFRAID AS IF SOMETHING AWFUL MIGHT HAPPEN: SEVERAL DAYS
GAD7 TOTAL SCORE: 5

## 2018-02-27 ASSESSMENT — PATIENT HEALTH QUESTIONNAIRE - PHQ9: 5. POOR APPETITE OR OVEREATING: SEVERAL DAYS

## 2018-02-27 NOTE — PROGRESS NOTES
"                                               Progress Note    Client Name: Torres Cuevas  Date: 2-27-18         Service Type: Individual      Session Start Time: 4:00  Session End Time: 4:45      Session Length: 45     Session #: 5     Attendees: Client    Treatment Plan Last Reviewed: Started tx plan today 1-16-18  PHQ-9 / KANDY-7 : Completed this session-Both PHQ9 and GAD7 scores greatly improved     DATA      Progress Since Last Session (Related to Symptoms / Goals / Homework):   Symptoms: Improved    Homework: Achieved / completed to satisfactionPrevious sessions: Worked on tx plan in session today.  Client will engage in thought stopping process to develop awareness about when he starts to feel anger or irritability. Work on positive communication skills with wife, engage in positive communication sessions with her weekly and use \"I\" statements.  Continue to use mindfulness HOANG when client needs to regulate his mood. Client will start documenting and journaling feelings and thoughts in a journal and concentrate on strengths and 3 positives or things that he accomplishes each day and what bring him edward.  Client having difficulty in his relationship and continuing to share with his wife how he feels and thinks about issues in his marriage.  Continue to engage in activities that distract him form negative feelings and thoughts.Client is communicating better with his partner, they have scheduled couple's counseling.  Client is on a weight loss program and is feeling better about this and is also working on regulating his anger better and concentrating on positives about self and things that he accomplishes and journaling when he needs too. Current session: Client is having more difficulty with relationship issues, they are trying to find a marriage counselor they both can relate too.  They continue to look into this.  We discussed asking for what his wife wants.  Continuing to be open to her awareness about an " "issue, don't get caught up in who is right or wrong.  Bring down defenses, communicate about everyday things and continue practicing positive relationship skills.  Client to be open about feelings, thought, needs and wants.  Not to get caught up in giving in to make the peace because that can lead to resentment. Client working on exercise, getting together with friends, eating better and sleeping well.  Concentrating on positive affirmations and strengths.  Client given assignment to try with wife \"going down memory lesia\"  Concentrate on positive aspects of the relationship.         Episode of Care Goals: Achieved / completed to satisfaction - ACTION (Actively working towards change); Intervened by reinforcing change plan / affirming steps taken     Current / Ongoing Stressors and Concerns:              relationship stress, little interest in doing things, anger regulation issues, financial stress      Treatment Objective(s) Addressed in This Session:   use thought-stopping strategy daily to reduce intrusive thoughts  Worked on treatment plan goals in session  Journal thoughts and feelings, strengths and accomplishments each day and what brings him edward and happiness each day, Effective communication skills   Intervention:   Thought stopping process, effective communication skills    Journaling, distraction activities, self care activities  Anger regulation, concentrating on positive affirmations and strengths,    ASSESSMENT: Current Emotional / Mental Status (status of significant symptoms):   Risk status (Self / Other harm or suicidal ideation)   Client denies current fears or concerns for personal safety.   Client denies current or recent suicidal ideation or behaviors.   Client denies current or recent homicidal ideation or behaviors.   Client denies current or recent self injurious behavior or ideation.   Client denies other safety concerns.   A safety and risk management plan has not been developed at this " "time, however client was given the after-hours number / 911 should there be a change in any of these risk factors.     Appearance:   Appropriate    Eye Contact:   Good    Psychomotor Behavior: Normal    Attitude:   Cooperative    Orientation:   All   Speech    Rate / Production: Normal     Volume:  Normal    Mood:    Depressed  Sad    Affect:    Blunted  Flat  Subdued    Thought Content:  Referential Thinking    Thought Form:  Goal Directed    Insight:    Fair      Medication Review:   No changes to current psychiatric medication(s)     Medication Compliance:   Yes     Changes in Health Issues:   None reported     Chemical Use Review:   Substance Use: Chemical use reviewed, no active concerns identified      Tobacco Use: No current tobacco use.       Collateral Reports Completed:   Not Applicable    PLAN: (Client Tasks / Therapist Tasks / Other) Past sessions: Worked on tx plan in session today.  Client will engage in thought stopping process to develop awareness about when he starts to feel anger or irritability. Work on positive communication skills with wife, engage in positive communication sessions with her weekly and use \"I\" statements.  Continue to use mindfulness HOANG when client needs to regulate his mood. Scheduled additional counseling sessions. Client will start documenting and journaling feelings and thoughts in a journal and concentrate on strengths and 3 positives or things that he accomplishes each day and what bring him edward.  Client having difficulty in his relationship and continuing to share with his wife how he feels and thinks about issues in his marriage.  Continue to engage in activities that distract him form negative feelings and thoughts. Concentrate on good self care activities that improve his mood.Client is communicating better with his partner, they have scheduled couple's counseling.  Client is on a weight loss program and is feeling better about this and is also working on regulating " "his anger better and concentrating on positives about self and things that he accomplishes and journaling when he needs too. Current session: Client is having more difficulty with relationship issues, they are trying to find a marriage counselor they both can relate too.  They continue to look into this.  We discussed asking for what his wife wants.  Continuing to be open to her awareness about an issue, don't get caught up in who is right or wrong.  Bring down defenses, communicate about everyday things and continue practicing positive relationship skills.  Client to be open about feelings, thought, needs and wants.  Not to get caught up in giving in to make the peace because that can lead to resentment. Client working on exercise, getting together with friends, eating better and sleeping well.  Concentrating on positive affirmations and strengths. Client given assignment to try with wife \"going down memory lesia\"  Concentrate on positive aspects of the relationship.              Hermilo Borja, [facility]                                                         ________________________________________________________________________    Treatment Plan    Client's Name: Torres Cuevas  YOB: 1980    Date: 1-16-18    DSM-V Diagnoses:  296.31 (F33.0) Major Depressive Disorder, Recurrent Episode, Mild _ and With anxious distress  Psychosocial & Contextual Factors: relationship stress, little interest in doing things, anger regulation issues, financial stress    WHODAS: Completed first session    Referral / Collaboration:  Referral to another professional/service is not indicated at this time..    Anticipated number of session or this episode of care: 15      MeasurableTreatment Goal(s) related to diagnosis / functional impairment(s)  Goal 1: Client will decrease symptoms of depression and anxiety and return to normal daily functioning    I will know I've met my goal when I feel better about myself and my " relationship and have less anger in my life.      Objective #A (Client Action)    Client will use cognitive strategies identified in therapy to challenge anxious thoughts and depressive thoughts.  Status: Continued - Date(s):1-16-18     Intervention(s)  Therapist will teach thought stopping process and CBT skills to regulate his mood.  Client will practice daily until it becomes automatic to regulate his mood better. .    Objective #B  Client will improve his overall self esteem and mood.  Status: Continued - Date(s): 1-16-18    Intervention(s)  Therapist will assign homework Complete self esteem schedule and work on areas that need improvement.  Client will concentrate on engaging in activities that improve his self esteem and write down what he accomplishes each day to improve his self esteem. .    Objective #C  Client will learn effective ways to regulate his anger..  Status: Continued - Date(s): 1-16-18     Intervention(s)  Therapist will assign homework Client will Learn the HEALS technique to regulate his anger daily or as needed. .        Client has reviewed and agreed to the above plan.      TORY Carcamo  January 16, 2018

## 2018-02-27 NOTE — MR AVS SNAPSHOT
"                  MRN:[id number]                      After Visit Summary   2018    Torres Cuevas    MRN: [id number]           Visit Information        Provider Department      2018 4:00 PM Hermilo Borja [facility]      Your next 10 appointments already scheduled     Mar 15, 2018  2:30 PM CDT   Return Visit with Hermilo Borja [facility]    [address] [address]   [phone]            Mar 27, 2018  3:00 PM CDT   Return Visit with Hermilo Borja [facility] ([facility])    [address] [address]   [phone]              MyChart Information     [facility] lets you send messages to your doctor, view your test results, renew your prescriptions, schedule appointments and more. To sign up, go to [email or web address] . Click on \"Log in\" on the left side of the screen, which will take you to the Welcome page. Then click on \"Sign up Now\" on the right side of the page.     You will be asked to enter the access code listed below, as well as some personal information. Please follow the directions to create your username and password.     Your access code is: [email or web address]  Expires: 2018  2:32 PM     Your access code will  in 90 days. If you need help or a new code, please call your [place] clinic or [phone].        Care EveryWhere ID     This is your Care EveryWhere ID. This could be used by other organizations to access your [place] medical records  [id number]        Equal Access to Services     PRETTY MANLEY : Hadii aad sahra velez Sodayna, waaxda luqadaha, qaybta kaalmada adeegyada, olga nelson . So [facility] [phone].    ATENCIÓN: Si habla español, tiene a camacho disposición servicios gratuitos de asistencia lingüística. Llame al " [phone].    We comply with applicable federal civil rights laws and [place] laws. We do not discriminate on the basis of race, color, national origin, age, disability, sex, sexual orientation, or gender identity.

## 2018-02-28 ASSESSMENT — ANXIETY QUESTIONNAIRES: GAD7 TOTAL SCORE: 5

## 2018-02-28 ASSESSMENT — PATIENT HEALTH QUESTIONNAIRE - PHQ9: SUM OF ALL RESPONSES TO PHQ QUESTIONS 1-9: 5

## 2018-03-15 ENCOUNTER — OFFICE VISIT (OUTPATIENT)
Dept: PSYCHOLOGY | Facility: CLINIC | Age: 38
End: 2018-03-15
Payer: COMMERCIAL

## 2018-03-15 DIAGNOSIS — F33.0 MAJOR DEPRESSIVE DISORDER, RECURRENT EPISODE, MILD WITH ANXIOUS DISTRESS (H): Primary | ICD-10-CM

## 2018-03-15 PROCEDURE — 90834 PSYTX W PT 45 MINUTES: CPT | Performed by: SOCIAL WORKER

## 2018-03-15 NOTE — PROGRESS NOTES
"                                               Progress Note    Client Name: Torres Cuevas  Date: 3-15-18         Service Type: Individual      Session Start Time: 2:30  Session End Time: 3:15      Session Length: 45     Session #: 6     Attendees: Client    Treatment Plan Last Reviewed: Started tx plan today 1-16-18  PHQ-9 / KANDY-7 : Complete next session     DATA      Progress Since Last Session (Related to Symptoms / Goals / Homework):   Symptoms: Stable    Homework: Achieved / completed to satisfactionPrevious sessions: Worked on tx plan in session today.  Client will engage in thought stopping process to develop awareness about when he starts to feel anger or irritability. Work on positive communication skills with wife, engage in positive communication sessions with her weekly and use \"I\" statements.  Continue to use mindfulness HOANG when client needs to regulate his mood. Client will start documenting and journaling feelings and thoughts in a journal and concentrate on strengths and 3 positives or things that he accomplishes each day and what bring him edward.  Client having difficulty in his relationship and continuing to share with his wife how he feels and thinks about issues in his marriage.  Continue to engage in activities that distract him form negative feelings and thoughts.Client is communicating better with his partner, they have scheduled couple's counseling.  Client is on a weight loss program and is feeling better about this and is also working on regulating his anger better and concentrating on positives about self and things that he accomplishes and journaling when he needs too. Client is having more difficulty with relationship issues, they are trying to find a marriage counselor they both can relate too.  They continue to look into this.  We discussed asking for what his wife wants.  Continuing to be open to her awareness about an issue, don't get caught up in who is right or wrong.  Bring down " "defenses, communicate about everyday things and continue practicing positive relationship skills.  Client to be open about feelings, thought, needs and wants.  Not to get caught up in giving in to make the peace because that can lead to resentment. Client working on exercise, getting together with friends, eating better and sleeping well.  Concentrating on positive affirmations and strengths.  Client given assignment to try with wife \"going down memory lesia\"  Concentrate on positive aspects of the relationship. Current session: Client was walking more, engaging with friends more and engaging in activities, had some anxiety one evening and not certain what creates this.  Will think about possibly journaling what triggers his anxiety in the future.  Concentrate on applying self compassion to core hurts that are triggered, ask spouse what she needs and wants from him, listen more and will not try and get stuck in powerlessness and more inner power to deal with issues.  Continue couple's counseling, think about getting a dog, and apply self compassion to core hurts and ask wife she wants from client.         Episode of Care Goals: Achieved / completed to satisfaction - ACTION (Actively working towards change); Intervened by reinforcing change plan / affirming steps taken     Current / Ongoing Stressors and Concerns:              relationship stress, little interest in doing things, anger regulation issues, financial stress      Treatment Objective(s) Addressed in This Session:   use thought-stopping strategy daily to reduce intrusive thoughts  Worked on treatment plan goals in session  Journal thoughts and feelings, strengths and accomplishments each day and what brings him edward and happiness each day, Effective communication skills, exercise, apply self compassion to core hurts  Ask wife what she wants from client, practice healthy anger regulation   Intervention:   Thought stopping process, effective communication " "skills    Journaling, distraction activities, self care activities  Anger regulation, concentrating on positive affirmations and strengths, apply self compassion to core hurts, exercise   ASSESSMENT: Current Emotional / Mental Status (status of significant symptoms):   Risk status (Self / Other harm or suicidal ideation)   Client denies current fears or concerns for personal safety.   Client denies current or recent suicidal ideation or behaviors.   Client denies current or recent homicidal ideation or behaviors.   Client denies current or recent self injurious behavior or ideation.   Client denies other safety concerns.   A safety and risk management plan has not been developed at this time, however client was given the after-hours number / 911 should there be a change in any of these risk factors.     Appearance:   Appropriate    Eye Contact:   Good    Psychomotor Behavior: Normal    Attitude:   Cooperative    Orientation:   All   Speech    Rate / Production: Normal     Volume:  Normal    Mood:    Anxious  Irritable    Affect:    Bright  Expansive  Worrisome    Thought Content:  Referential Thinking    Thought Form:  Goal Directed    Insight:    Fair      Medication Review:   No changes to current psychiatric medication(s)     Medication Compliance:   Yes     Changes in Health Issues:   None reported     Chemical Use Review:   Substance Use: Chemical use reviewed, no active concerns identified      Tobacco Use: No current tobacco use.       Collateral Reports Completed:   Not Applicable    PLAN: (Client Tasks / Therapist Tasks / Other) Past sessions: Worked on tx plan in session today.  Client will engage in thought stopping process to develop awareness about when he starts to feel anger or irritability. Work on positive communication skills with wife, engage in positive communication sessions with her weekly and use \"I\" statements.  Continue to use mindfulness HOANG when client needs to regulate his mood. Scheduled " "additional counseling sessions. Client will start documenting and journaling feelings and thoughts in a journal and concentrate on strengths and 3 positives or things that he accomplishes each day and what bring him edward.  Client having difficulty in his relationship and continuing to share with his wife how he feels and thinks about issues in his marriage.  Continue to engage in activities that distract him form negative feelings and thoughts. Concentrate on good self care activities that improve his mood.Client is communicating better with his partner, they have scheduled couple's counseling.  Client is on a weight loss program and is feeling better about this and is also working on regulating his anger better and concentrating on positives about self and things that he accomplishes and journaling when he needs too. Client is having more difficulty with relationship issues, they are trying to find a marriage counselor they both can relate too.  They continue to look into this.  We discussed asking for what his wife wants.  Continuing to be open to her awareness about an issue, don't get caught up in who is right or wrong.  Bring down defenses, communicate about everyday things and continue practicing positive relationship skills.  Client to be open about feelings, thought, needs and wants.  Not to get caught up in giving in to make the peace because that can lead to resentment. Client working on exercise, getting together with friends, eating better and sleeping well.  Concentrating on positive affirmations and strengths. Client given assignment to try with wife \"going down memory lesia\"  Concentrate on positive aspects of the relationship. Current session: Client was walking more, engaging with friends more and engaging in activities, had some anxiety one evening and not certain what creates this.  Will think about possibly journaling what triggers his anxiety in the future.  Concentrate on applying self compassion " to core hurts that are triggered, ask spouse what she needs and wants from him, listen more and will not try and get stuck in powerlessness and more inner power to deal with issues.  Continue couple's counseling, think about getting a dog, and apply self compassion to core hurts and ask wife she wants from client.                Hermilo FLEMINGMarcos Borja, LICSW                                                         ________________________________________________________________________    Treatment Plan    Client's Name: Torres Cuevas  YOB: 1980    Date: 1-16-18    DSM-V Diagnoses:  296.31 (F33.0) Major Depressive Disorder, Recurrent Episode, Mild _ and With anxious distress  Psychosocial & Contextual Factors: relationship stress, little interest in doing things, anger regulation issues, financial stress    WHODAS: Completed first session    Referral / Collaboration:  Referral to another professional/service is not indicated at this time..    Anticipated number of session or this episode of care: 15      MeasurableTreatment Goal(s) related to diagnosis / functional impairment(s)  Goal 1: Client will decrease symptoms of depression and anxiety and return to normal daily functioning    I will know I've met my goal when I feel better about myself and my relationship and have less anger in my life.      Objective #A (Client Action)    Client will use cognitive strategies identified in therapy to challenge anxious thoughts and depressive thoughts.  Status: Continued - Date(s):1-16-18     Intervention(s)  Therapist will teach thought stopping process and CBT skills to regulate his mood.  Client will practice daily until it becomes automatic to regulate his mood better. .    Objective #B  Client will improve his overall self esteem and mood.  Status: Continued - Date(s): 1-16-18    Intervention(s)  Therapist will assign homework Complete self esteem schedule and work on areas that need improvement.  Client will  concentrate on engaging in activities that improve his self esteem and write down what he accomplishes each day to improve his self esteem. .    Objective #C  Client will learn effective ways to regulate his anger..  Status: Continued - Date(s): 1-16-18     Intervention(s)  Therapist will assign homework Client will Learn the HEALS technique to regulate his anger daily or as needed. .        Client has reviewed and agreed to the above plan.      Hermilo Borja, [facility]  January 16, 2018

## 2018-03-15 NOTE — MR AVS SNAPSHOT
"                  MRN:[id number]                      After Visit Summary   3/15/2018    Torres Cuevas    MRN: [id number]           Visit Information        Provider Department      3/15/2018 2:30 PM Hermilo Borja [facility]      Your next 10 appointments already scheduled     Mar 27, 2018  3:00 PM CDT   Return Visit with Hermilo Borja [facility] ([facility])    [address] [place] [address]   [phone]            2018  1:00 PM CDT   Return Visit with Hermilo Borja [facility] ([facility])    [address] [address]   [phone]              MyChart Information     [employer] lets you send messages to your doctor, view your test results, renew your prescriptions, schedule appointments and more. To sign up, go to [email or web address] . Click on \"Log in\" on the left side of the screen, which will take you to the Welcome page. Then click on \"Sign up Now\" on the right side of the page.     You will be asked to enter the access code listed below, as well as some personal information. Please follow the directions to create your username and password.     Your access code is: [email or web address]  Expires: 2018  3:32 PM     Your access code will  in 90 days. If you need help or a new code, please call your [place] clinic or [phone].        Care EveryWhere ID     This is your Care EveryWhere ID. This could be used by other organizations to access your [place] medical records  [id number]        Equal Access to Services     PRETTY MANLEY : Hadii aad sahra velez Sodayna, waaxda luqadaha, qaybta kaalmada adeegyada, olga nelson . So [facility] [phone].    ATENCIÓN: Si habla español, tiene a camacho disposición servicios gratuitos de asistencia lingüística. Llame al " [phone].    We comply with applicable federal civil rights laws and [place] laws. We do not discriminate on the basis of race, color, national origin, age, disability, sex, sexual orientation, or gender identity.

## 2018-03-27 ENCOUNTER — OFFICE VISIT (OUTPATIENT)
Dept: PSYCHOLOGY | Facility: CLINIC | Age: 38
End: 2018-03-27
Payer: COMMERCIAL

## 2018-03-27 DIAGNOSIS — F33.0 MAJOR DEPRESSIVE DISORDER, RECURRENT EPISODE, MILD WITH ANXIOUS DISTRESS (H): Primary | ICD-10-CM

## 2018-03-27 PROCEDURE — 90834 PSYTX W PT 45 MINUTES: CPT | Performed by: SOCIAL WORKER

## 2018-03-27 ASSESSMENT — ANXIETY QUESTIONNAIRES
5. BEING SO RESTLESS THAT IT IS HARD TO SIT STILL: NOT AT ALL
IF YOU CHECKED OFF ANY PROBLEMS ON THIS QUESTIONNAIRE, HOW DIFFICULT HAVE THESE PROBLEMS MADE IT FOR YOU TO DO YOUR WORK, TAKE CARE OF THINGS AT HOME, OR GET ALONG WITH OTHER PEOPLE: NOT DIFFICULT AT ALL
3. WORRYING TOO MUCH ABOUT DIFFERENT THINGS: NOT AT ALL
1. FEELING NERVOUS, ANXIOUS, OR ON EDGE: NOT AT ALL
2. NOT BEING ABLE TO STOP OR CONTROL WORRYING: NOT AT ALL
6. BECOMING EASILY ANNOYED OR IRRITABLE: NOT AT ALL
GAD7 TOTAL SCORE: 0
7. FEELING AFRAID AS IF SOMETHING AWFUL MIGHT HAPPEN: NOT AT ALL

## 2018-03-27 ASSESSMENT — PATIENT HEALTH QUESTIONNAIRE - PHQ9: 5. POOR APPETITE OR OVEREATING: NOT AT ALL

## 2018-03-27 NOTE — MR AVS SNAPSHOT
"                  MRN:[id number]                      After Visit Summary   3/27/2018    Torres Cuevas    MRN: [id number]           Visit Information        Provider Department      3/27/2018 3:00 PM Hermilo Borja [facility] Generic      Your next 10 appointments already scheduled     2018  1:00 PM CDT   Return Visit with Hermilo Borja [facility] ([facility])    [address] [place] [id number]   [phone]            2018  2:30 PM CDT   Return Visit with Hermilo Bojra [facility] ([facility])    [address] [facility] [address]   [phone]              MyChart Information     [employer] lets you send messages to your doctor, view your test results, renew your prescriptions, schedule appointments and more. To sign up, go to [email or web address] . Click on \"Log in\" on the left side of the screen, which will take you to the Welcome page. Then click on \"Sign up Now\" on the right side of the page.     You will be asked to enter the access code listed below, as well as some personal information. Please follow the directions to create your username and password.     Your access code is: [email or web address]  Expires: 2018  3:32 PM     Your access code will  in 90 days. If you need help or a new code, please call your [place] clinic or [phone].        Care EveryWhere ID     This is your Care EveryWhere ID. This could be used by other organizations to access your [place] medical records  [id number]        Equal Access to Services     PRETTY MANLEY : Hadii aad sahra velez Sodayna, waaxda luqadaha, qaybta kaalmada adeegyada, olga nelson . So [facility] [phone].    ATENCIÓN: Si habla español, tiene a camacho disposición servicios gratuitos de asistencia lingüística. Llame al " [phone].    We comply with applicable federal civil rights laws and [place] laws. We do not discriminate on the basis of race, color, national origin, age, disability, sex, sexual orientation, or gender identity.

## 2018-03-27 NOTE — PROGRESS NOTES
"                                               Progress Note    Client Name: Torres Cuevas  Date: 3-27-18         Service Type: Individual      Session Start Time: 3:00  Session End Time: 3:45      Session Length: 45     Session #: 7     Attendees: Client    Treatment Plan Last Reviewed: Started tx plan today 1-16-18  PHQ-9 / KANDY-7 : Completed this session-Improved scores on both screenings     DATA      Progress Since Last Session (Related to Symptoms / Goals / Homework):   Symptoms: Improved-less depression symptoms and anxiety    Homework: Achieved / completed to satisfactionPrevious sessions: Worked on tx plan in session today.  Client will engage in thought stopping process to develop awareness about when he starts to feel anger or irritability. Work on positive communication skills with wife, engage in positive communication sessions with her weekly and use \"I\" statements.  Continue to use mindfulness HOANG when client needs to regulate his mood. Client will start documenting and journaling feelings and thoughts in a journal and concentrate on strengths and 3 positives or things that he accomplishes each day and what bring him edward.  Client having difficulty in his relationship and continuing to share with his wife how he feels and thinks about issues in his marriage.  Continue to engage in activities that distract him form negative feelings and thoughts.Client is communicating better with his partner, they have scheduled couple's counseling.  Client is on a weight loss program and is feeling better about this and is also working on regulating his anger better and concentrating on positives about self and things that he accomplishes and journaling when he needs too. Client is having more difficulty with relationship issues, they are trying to find a marriage counselor they both can relate too.  They continue to look into this.  We discussed asking for what his wife wants.  Continuing to be open to her awareness " "about an issue, don't get caught up in who is right or wrong.  Bring down defenses, communicate about everyday things and continue practicing positive relationship skills.  Client to be open about feelings, thought, needs and wants.  Not to get caught up in giving in to make the peace because that can lead to resentment. Client working on exercise, getting together with friends, eating better and sleeping well.  Concentrating on positive affirmations and strengths.  Client given assignment to try with wife \"going down memory lesia\"  Concentrate on positive aspects of the relationship. Client was walking more, engaging with friends more and engaging in activities, had some anxiety one evening and not certain what creates this.  Will think about possibly journaling what triggers his anxiety in the future.  Concentrate on applying self compassion to core hurts that are triggered, ask spouse what she needs and wants from him, listen more and will not try and get stuck in powerlessness and more inner power to deal with issues.  Continue couple's counseling, think about getting a dog, and apply self compassion to core hurts and ask wife she wants from client. Current session:  Client to continue with exercise, positive activities that improve his mood, continued couple's therapy and engaging in positive communication strategies with wife.  Continue positive affirmations and concentrating on strengths daily.       Episode of Care Goals: Achieved / completed to satisfaction - ACTION (Actively working towards change); Intervened by reinforcing change plan / affirming steps taken     Current / Ongoing Stressors and Concerns:              relationship stress, little interest in doing things, anger regulation issues, financial stress      Treatment Objective(s) Addressed in This Session:   use thought-stopping strategy daily to reduce intrusive thoughts  Worked on treatment plan goals in session  Journal thoughts and feelings, " strengths and accomplishments each day and what brings him edward and happiness each day, Effective communication skills, exercise, apply self compassion to core hurts  Ask wife what she wants from client, practice healthy anger regulation   Intervention:   Thought stopping process, effective communication skills    Journaling, distraction activities, self care activities  Anger regulation, concentrating on positive affirmations and strengths, apply self compassion to core hurts, exercise  Continue couple's therapy   ASSESSMENT: Current Emotional / Mental Status (status of significant symptoms):   Risk status (Self / Other harm or suicidal ideation)   Client denies current fears or concerns for personal safety.   Client denies current or recent suicidal ideation or behaviors.   Client denies current or recent homicidal ideation or behaviors.   Client denies current or recent self injurious behavior or ideation.   Client denies other safety concerns.   A safety and risk management plan has not been developed at this time, however client was given the after-hours number / 911 should there be a change in any of these risk factors.     Appearance:   Appropriate    Eye Contact:   Good    Psychomotor Behavior: Normal    Attitude:   Cooperative    Orientation:   All   Speech    Rate / Production: Normal     Volume:  Normal    Mood:    Anxious  Depressed    Affect:    Bright  Expansive  Worrisome    Thought Content:  Referential Thinking    Thought Form:  Goal Directed    Insight:    Fair      Medication Review:   No changes to current psychiatric medication(s)     Medication Compliance:   Yes     Changes in Health Issues:   None reported     Chemical Use Review:   Substance Use: Chemical use reviewed, no active concerns identified      Tobacco Use: No current tobacco use.       Collateral Reports Completed:   Not Applicable    PLAN: (Client Tasks / Therapist Tasks / Other) Past sessions: Worked on tx plan in session today.   "Client will engage in thought stopping process to develop awareness about when he starts to feel anger or irritability. Work on positive communication skills with wife, engage in positive communication sessions with her weekly and use \"I\" statements.  Continue to use mindfulness HOANG when client needs to regulate his mood. Scheduled additional counseling sessions. Client will start documenting and journaling feelings and thoughts in a journal and concentrate on strengths and 3 positives or things that he accomplishes each day and what bring him edward.  Client having difficulty in his relationship and continuing to share with his wife how he feels and thinks about issues in his marriage.  Continue to engage in activities that distract him form negative feelings and thoughts. Concentrate on good self care activities that improve his mood.Client is communicating better with his partner, they have scheduled couple's counseling.  Client is on a weight loss program and is feeling better about this and is also working on regulating his anger better and concentrating on positives about self and things that he accomplishes and journaling when he needs too. Client is having more difficulty with relationship issues, they are trying to find a marriage counselor they both can relate too.  They continue to look into this.  We discussed asking for what his wife wants.  Continuing to be open to her awareness about an issue, don't get caught up in who is right or wrong.  Bring down defenses, communicate about everyday things and continue practicing positive relationship skills.  Client to be open about feelings, thought, needs and wants.  Not to get caught up in giving in to make the peace because that can lead to resentment. Client working on exercise, getting together with friends, eating better and sleeping well.  Concentrating on positive affirmations and strengths. Client given assignment to try with wife \"going down memory lesia\"  " Concentrate on positive aspects of the relationship.  Client was walking more, engaging with friends more and engaging in activities, had some anxiety one evening and not certain what creates this.  Will think about possibly journaling what triggers his anxiety in the future.  Concentrate on applying self compassion to core hurts that are triggered, ask spouse what she needs and wants from him, listen more and will not try and get stuck in powerlessness and more inner power to deal with issues.  Continue couple's counseling, think about getting a dog, and apply self compassion to core hurts and ask wife she wants from client. Current session:  Client to continue with exercise, positive activities that improve his mood, continued couple's therapy and engaging in positive communication strategies with wife.  Continue positive affirmations and concentrating on strengths daily.                   Hermilo Borja, [facility]                                                         ________________________________________________________________________    Treatment Plan    Client's Name: Torres Cuevas  YOB: 1980    Date: 1-16-18    DSM-V Diagnoses:  296.31 (F33.0) Major Depressive Disorder, Recurrent Episode, Mild _ and With anxious distress  Psychosocial & Contextual Factors: relationship stress, little interest in doing things, anger regulation issues, financial stress    WHODAS: Completed first session    Referral / Collaboration:  Referral to another professional/service is not indicated at this time..    Anticipated number of session or this episode of care: 15      MeasurableTreatment Goal(s) related to diagnosis / functional impairment(s)  Goal 1: Client will decrease symptoms of depression and anxiety and return to normal daily functioning    I will know I've met my goal when I feel better about myself and my relationship and have less anger in my life.      Objective #A (Client Action)    Client will  use cognitive strategies identified in therapy to challenge anxious thoughts and depressive thoughts.  Status: Continued - Date(s):1-16-18     Intervention(s)  Therapist will teach thought stopping process and CBT skills to regulate his mood.  Client will practice daily until it becomes automatic to regulate his mood better. .    Objective #B  Client will improve his overall self esteem and mood.  Status: Continued - Date(s): 1-16-18    Intervention(s)  Therapist will assign homework Complete self esteem schedule and work on areas that need improvement.  Client will concentrate on engaging in activities that improve his self esteem and write down what he accomplishes each day to improve his self esteem. .    Objective #C  Client will learn effective ways to regulate his anger..  Status: Continued - Date(s): 1-16-18     Intervention(s)  Therapist will assign homework Client will Learn the HEALS technique to regulate his anger daily or as needed. .        Client has reviewed and agreed to the above plan.      Hermilo Borja, [facility]  January 16, 2018

## 2018-03-28 ASSESSMENT — PATIENT HEALTH QUESTIONNAIRE - PHQ9: SUM OF ALL RESPONSES TO PHQ QUESTIONS 1-9: 4

## 2018-03-28 ASSESSMENT — ANXIETY QUESTIONNAIRES: GAD7 TOTAL SCORE: 0

## 2018-04-13 ENCOUNTER — OFFICE VISIT (OUTPATIENT)
Dept: PSYCHOLOGY | Facility: CLINIC | Age: 38
End: 2018-04-13
Payer: COMMERCIAL

## 2018-04-13 DIAGNOSIS — F33.0 MAJOR DEPRESSIVE DISORDER, RECURRENT EPISODE, MILD WITH ANXIOUS DISTRESS (H): Primary | ICD-10-CM

## 2018-04-13 PROCEDURE — 90834 PSYTX W PT 45 MINUTES: CPT | Performed by: SOCIAL WORKER

## 2018-04-13 NOTE — MR AVS SNAPSHOT
"                  MRN:[id number]                      After Visit Summary   2018    Torres Cuevas    MRN: [id number]           Visit Information        Provider Department      2018 1:00 PM Hermilo Borja [facility] Generic      Your next 10 appointments already scheduled     2018  2:30 PM CDT   Return Visit with Hermilo Borja [facility] ([facility])    [address] [facility] [address]   [phone]            May 14, 2018  4:30 PM CDT   Return Visit with Hermilo Borja [facility] ([facility])    [address] [facility] [address]   [phone]              MyChart Information     [employer] lets you send messages to your doctor, view your test results, renew your prescriptions, schedule appointments and more. To sign up, go to [email or web address] . Click on \"Log in\" on the left side of the screen, which will take you to the Welcome page. Then click on \"Sign up Now\" on the right side of the page.     You will be asked to enter the access code listed below, as well as some personal information. Please follow the directions to create your username and password.     Your access code is: [email or web address]  Expires: 2018  2:06 PM     Your access code will  in 90 days. If you need help or a new code, please call your [place] clinic or [phone].        Care EveryWhere ID     This is your Care EveryWhere ID. This could be used by other organizations to access your [place] medical records  [id number]        Equal Access to Services     PRETTY [facility] : Hadii jeison velez Sodayna, waaxda luqadaha, qaybta kaalmada adeegyada, olga nelson . So [facility] [phone].    ATENCIÓN: Si habla español, tiene a camacho disposición servicios gratuitos de asistencia lingüística. Llame al " [phone].    We comply with applicable federal civil rights laws and [place] laws. We do not discriminate on the basis of race, color, national origin, age, disability, sex, sexual orientation, or gender identity.

## 2018-04-13 NOTE — PROGRESS NOTES
"                                               Progress Note    Client Name: Torres Cuevas  Date: 4-13-18         Service Type: Individual      Session Start Time: 1:00  Session End Time: 1:45      Session Length: 45     Session #: 8     Attendees: Client    Treatment Plan Last Reviewed: Started tx plan today 1-16-18  PHQ-9 / KANDY-7 : Complete next session     DATA      Progress Since Last Session (Related to Symptoms / Goals / Homework):   Symptoms: Stable-less depression symptoms and anxiety    Homework: Achieved / completed to satisfactionPrevious sessions: Worked on tx plan in session today.  Client will engage in thought stopping process to develop awareness about when he starts to feel anger or irritability. Work on positive communication skills with wife, engage in positive communication sessions with her weekly and use \"I\" statements.  Continue to use mindfulness HOANG when client needs to regulate his mood. Client will start documenting and journaling feelings and thoughts in a journal and concentrate on strengths and 3 positives or things that he accomplishes each day and what bring him edward.  Client having difficulty in his relationship and continuing to share with his wife how he feels and thinks about issues in his marriage.  Continue to engage in activities that distract him form negative feelings and thoughts.Client is communicating better with his partner, they have scheduled couple's counseling.  Client is on a weight loss program and is feeling better about this and is also working on regulating his anger better and concentrating on positives about self and things that he accomplishes and journaling when he needs too. Client is having more difficulty with relationship issues, they are trying to find a marriage counselor they both can relate too.  They continue to look into this.  We discussed asking for what his wife wants.  Continuing to be open to her awareness about an issue, don't get caught up in " "who is right or wrong.  Bring down defenses, communicate about everyday things and continue practicing positive relationship skills.  Client to be open about feelings, thought, needs and wants.  Not to get caught up in giving in to make the peace because that can lead to resentment. Client working on exercise, getting together with friends, eating better and sleeping well.  Concentrating on positive affirmations and strengths.  Client given assignment to try with wife \"going down memory lesia\"  Concentrate on positive aspects of the relationship. Client was walking more, engaging with friends more and engaging in activities, had some anxiety one evening and not certain what creates this.  Will think about possibly journaling what triggers his anxiety in the future.  Concentrate on applying self compassion to core hurts that are triggered, ask spouse what she needs and wants from him, listen more and will not try and get stuck in powerlessness and more inner power to deal with issues.  Continue couple's counseling, think about getting a dog, and apply self compassion to core hurts and ask wife she wants from client.  Client to continue with exercise, positive activities that improve his mood, continued couple's therapy and engaging in positive communication strategies with wife.  Continue positive affirmations and concentrating on strengths daily. Current session: Client and wife decided to separate and most likely move towards a divorce.  This has been difficult for both of them but they have been amicable to each other.  We processed and discussed stages of loss, continue to work on positives in his life like weight loss, exercise, getting a dog, concentrating on positives and strengths in life.  Client talked a little about his relationship with his sister and possibly repairing this in the future. Relationship has been strained for a couple of years.        Episode of Care Goals: Achieved / completed to " satisfaction - ACTION (Actively working towards change); Intervened by reinforcing change plan / affirming steps taken     Current / Ongoing Stressors and Concerns:              relationship stress, little interest in doing things, anger regulation issues, financial stress      Treatment Objective(s) Addressed in This Session:   use thought-stopping strategy daily to reduce intrusive thoughts  Worked on treatment plan goals in session  Journal thoughts and feelings, strengths and accomplishments each day and what brings him edward and happiness each day, Effective communication skills, exercise, weight loss, apply self compassion to core hurts  Processed grief and loss stages, concentrated on continuing positive and healthy activities in life   Intervention:   Thought stopping process, effective communication skills    Journaling, distraction activities, self care activities  Anger regulation, concentrating on positive affirmations and strengths, apply self compassion to core hurts, exercise, weight loss  Grief and loss of marriage,   ASSESSMENT: Current Emotional / Mental Status (status of significant symptoms):   Risk status (Self / Other harm or suicidal ideation)   Client denies current fears or concerns for personal safety.   Client denies current or recent suicidal ideation or behaviors.   Client denies current or recent homicidal ideation or behaviors.   Client denies current or recent self injurious behavior or ideation.   Client denies other safety concerns.   A safety and risk management plan has not been developed at this time, however client was given the after-hours number / 911 should there be a change in any of these risk factors.     Appearance:   Appropriate    Eye Contact:   Good    Psychomotor Behavior: Normal    Attitude:   Cooperative    Orientation:   All   Speech    Rate / Production: Normal     Volume:  Normal    Mood:    Depressed  Sad    Affect:    Blunted  Worrisome    Thought  "Content:  Referential Thinking  Rumination    Thought Form:  Goal Directed    Insight:    Fair      Medication Review:   No changes to current psychiatric medication(s)     Medication Compliance:   Yes     Changes in Health Issues:   None reported     Chemical Use Review:   Substance Use: Chemical use reviewed, no active concerns identified      Tobacco Use: No current tobacco use.       Collateral Reports Completed:   Not Applicable    PLAN: (Client Tasks / Therapist Tasks / Other) Past sessions: Worked on tx plan in session today.  Client will engage in thought stopping process to develop awareness about when he starts to feel anger or irritability. Work on positive communication skills with wife, engage in positive communication sessions with her weekly and use \"I\" statements.  Continue to use mindfulness HOANG when client needs to regulate his mood. Scheduled additional counseling sessions. Client will start documenting and journaling feelings and thoughts in a journal and concentrate on strengths and 3 positives or things that he accomplishes each day and what bring him edward.  Client having difficulty in his relationship and continuing to share with his wife how he feels and thinks about issues in his marriage.  Continue to engage in activities that distract him form negative feelings and thoughts. Concentrate on good self care activities that improve his mood.Client is communicating better with his partner, they have scheduled couple's counseling.  Client is on a weight loss program and is feeling better about this and is also working on regulating his anger better and concentrating on positives about self and things that he accomplishes and journaling when he needs too. Client is having more difficulty with relationship issues, they are trying to find a marriage counselor they both can relate too.  They continue to look into this.  We discussed asking for what his wife wants.  Continuing to be open to her " "awareness about an issue, don't get caught up in who is right or wrong.  Bring down defenses, communicate about everyday things and continue practicing positive relationship skills.  Client to be open about feelings, thought, needs and wants.  Not to get caught up in giving in to make the peace because that can lead to resentment. Client working on exercise, getting together with friends, eating better and sleeping well.  Concentrating on positive affirmations and strengths. Client given assignment to try with wife \"going down memory lesia\"  Concentrate on positive aspects of the relationship.  Client was walking more, engaging with friends more and engaging in activities, had some anxiety one evening and not certain what creates this.  Will think about possibly journaling what triggers his anxiety in the future.  Concentrate on applying self compassion to core hurts that are triggered, ask spouse what she needs and wants from him, listen more and will not try and get stuck in powerlessness and more inner power to deal with issues.  Continue couple's counseling, think about getting a dog, and apply self compassion to core hurts and ask wife she wants from client.  Client to continue with exercise, positive activities that improve his mood, continued couple's therapy and engaging in positive communication strategies with wife.  Continue positive affirmations and concentrating on strengths daily.Current session: Client and wife decided to separate and most likely move towards a divorce.  This has been difficult for both of them but they have been amicable to each other.  We processed and discussed stages of loss, continue to work on positives in his life like weight loss, exercise, getting a dog, concentrating on positives and strengths in life.  Client talked a little about his relationship with his sister and possibly repairing this in the future. Relationship has been strained for a couple of years.             "           Hermilo Borja, LICSW                                                         ________________________________________________________________________    Treatment Plan    Client's Name: Torres Cuevas  YOB: 1980    Date: 1-16-18    DSM-V Diagnoses:  296.31 (F33.0) Major Depressive Disorder, Recurrent Episode, Mild _ and With anxious distress  Psychosocial & Contextual Factors: relationship stress, little interest in doing things, anger regulation issues, financial stress    WHODAS: Completed first session    Referral / Collaboration:  Referral to another professional/service is not indicated at this time..    Anticipated number of session or this episode of care: 15      MeasurableTreatment Goal(s) related to diagnosis / functional impairment(s)  Goal 1: Client will decrease symptoms of depression and anxiety and return to normal daily functioning    I will know I've met my goal when I feel better about myself and my relationship and have less anger in my life.      Objective #A (Client Action)    Client will use cognitive strategies identified in therapy to challenge anxious thoughts and depressive thoughts.  Status: Continued - Date(s):1-16-18     Intervention(s)  Therapist will teach thought stopping process and CBT skills to regulate his mood.  Client will practice daily until it becomes automatic to regulate his mood better. .    Objective #B  Client will improve his overall self esteem and mood.  Status: Continued - Date(s): 1-16-18    Intervention(s)  Therapist will assign homework Complete self esteem schedule and work on areas that need improvement.  Client will concentrate on engaging in activities that improve his self esteem and write down what he accomplishes each day to improve his self esteem. .    Objective #C  Client will learn effective ways to regulate his anger..  Status: Continued - Date(s): 1-16-18     Intervention(s)  Therapist will assign homework Client will Learn  the HEALS technique to regulate his anger daily or as needed. .        Client has reviewed and agreed to the above plan.      Hermilo Borja, [facility]  January 16, 2018

## 2018-04-26 ENCOUNTER — OFFICE VISIT (OUTPATIENT)
Dept: PSYCHOLOGY | Facility: CLINIC | Age: 38
End: 2018-04-26
Payer: COMMERCIAL

## 2018-04-26 DIAGNOSIS — F33.0 MAJOR DEPRESSIVE DISORDER, RECURRENT EPISODE, MILD WITH ANXIOUS DISTRESS (H): Primary | ICD-10-CM

## 2018-04-26 PROCEDURE — 90834 PSYTX W PT 45 MINUTES: CPT | Performed by: SOCIAL WORKER

## 2018-04-26 ASSESSMENT — ANXIETY QUESTIONNAIRES
2. NOT BEING ABLE TO STOP OR CONTROL WORRYING: NOT AT ALL
IF YOU CHECKED OFF ANY PROBLEMS ON THIS QUESTIONNAIRE, HOW DIFFICULT HAVE THESE PROBLEMS MADE IT FOR YOU TO DO YOUR WORK, TAKE CARE OF THINGS AT HOME, OR GET ALONG WITH OTHER PEOPLE: NOT DIFFICULT AT ALL
1. FEELING NERVOUS, ANXIOUS, OR ON EDGE: NOT AT ALL
6. BECOMING EASILY ANNOYED OR IRRITABLE: NOT AT ALL
7. FEELING AFRAID AS IF SOMETHING AWFUL MIGHT HAPPEN: NOT AT ALL
5. BEING SO RESTLESS THAT IT IS HARD TO SIT STILL: NOT AT ALL
GAD7 TOTAL SCORE: 0
3. WORRYING TOO MUCH ABOUT DIFFERENT THINGS: NOT AT ALL

## 2018-04-26 ASSESSMENT — PATIENT HEALTH QUESTIONNAIRE - PHQ9: 5. POOR APPETITE OR OVEREATING: NOT AT ALL

## 2018-04-26 NOTE — PROGRESS NOTES
"                                               Progress Note    Client Name: Torres Cuevas  Date: 4-25-18         Service Type: Individual      Session Start Time: 2:30  Session End Time: 3:15      Session Length: 45     Session #: 9     Attendees: Client    Treatment Plan Last Reviewed: Started tx plan today 1-16-18  PHQ-9 / KANDY-7 : Completed this session. Lower score on PHQ9 and no issues with the GAD7 still a 0 rating     DATA      Progress Since Last Session (Related to Symptoms / Goals / Homework):   Symptoms: Stable-less depression symptoms and anxiety    Homework: Achieved / completed to satisfactionPrevious sessions: Worked on tx plan in session today.  Client will engage in thought stopping process to develop awareness about when he starts to feel anger or irritability. Work on positive communication skills with wife, engage in positive communication sessions with her weekly and use \"I\" statements.  Continue to use mindfulness HOANG when client needs to regulate his mood. Client will start documenting and journaling feelings and thoughts in a journal and concentrate on strengths and 3 positives or things that he accomplishes each day and what bring him edward.  Client having difficulty in his relationship and continuing to share with his wife how he feels and thinks about issues in his marriage.  Continue to engage in activities that distract him form negative feelings and thoughts.Client is communicating better with his partner, they have scheduled couple's counseling.  Client is on a weight loss program and is feeling better about this and is also working on regulating his anger better and concentrating on positives about self and things that he accomplishes and journaling when he needs too. Client is having more difficulty with relationship issues, they are trying to find a marriage counselor they both can relate too.  They continue to look into this.  We discussed asking for what his wife wants.  Continuing " "to be open to her awareness about an issue, don't get caught up in who is right or wrong.  Bring down defenses, communicate about everyday things and continue practicing positive relationship skills.  Client to be open about feelings, thought, needs and wants.  Not to get caught up in giving in to make the peace because that can lead to resentment. Client working on exercise, getting together with friends, eating better and sleeping well.  Concentrating on positive affirmations and strengths.  Client given assignment to try with wife \"going down memory lesia\"  Concentrate on positive aspects of the relationship. Client was walking more, engaging with friends more and engaging in activities, had some anxiety one evening and not certain what creates this.  Will think about possibly journaling what triggers his anxiety in the future.  Concentrate on applying self compassion to core hurts that are triggered, ask spouse what she needs and wants from him, listen more and will not try and get stuck in powerlessness and more inner power to deal with issues.  Continue couple's counseling, think about getting a dog, and apply self compassion to core hurts and ask wife she wants from client.  Client to continue with exercise, positive activities that improve his mood, continued couple's therapy and engaging in positive communication strategies with wife.  Continue positive affirmations and concentrating on strengths daily. Client and wife decided to separate and most likely move towards a divorce.  This has been difficult for both of them but they have been amicable to each other.  We processed and discussed stages of loss, continue to work on positives in his life like weight loss, exercise, getting a dog, concentrating on positives and strengths in life.  Client talked a little about his relationship with his sister and possibly repairing this in the future. Relationship has been strained for a couple of years. Current " session: Client is doing well dealing with the decision to end his marriage.  Client is hoping to rescue a dog, continue with exercise, engage in CBT skills if needed, continue positive self care skills and reach out to support system if needed.        Episode of Care Goals: Achieved / completed to satisfaction - ACTION (Actively working towards change); Intervened by reinforcing change plan / affirming steps taken     Current / Ongoing Stressors and Concerns:              relationship stress, little interest in doing things, anger regulation issues, financial stress      Treatment Objective(s) Addressed in This Session:   use thought-stopping strategy daily to reduce intrusive thoughts  Worked on treatment plan goals in session  Journal thoughts and feelings, strengths and accomplishments each day and what brings him edward and happiness each day, Effective communication skills, exercise, weight loss, apply self compassion to core hurts  Processed grief and loss stages, concentrated on continuing positive and healthy activities in life, connect with support system and positive distraction activities.    Intervention:   Thought stopping process, effective communication skills    Journaling, distraction activities, self care activities  Anger regulation, concentrating on positive affirmations and strengths, apply self compassion to core hurts, exercise, weight loss  Grief and loss of marriage, CBT skills, connect with support syastem  ASSESSMENT: Current Emotional / Mental Status (status of significant symptoms):   Risk status (Self / Other harm or suicidal ideation)   Client denies current fears or concerns for personal safety.   Client denies current or recent suicidal ideation or behaviors.   Client denies current or recent homicidal ideation or behaviors.   Client denies current or recent self injurious behavior or ideation.   Client denies other safety concerns.   A safety and risk management plan has not been  "developed at this time, however client was given the after-hours number / 911 should there be a change in any of these risk factors.     Appearance:   Appropriate    Eye Contact:   Good    Psychomotor Behavior: Normal    Attitude:   Cooperative    Orientation:   All   Speech    Rate / Production: Normal     Volume:  Normal    Mood:    Depressed  Sad    Affect:    Subdued    Thought Content:  Referential Thinking    Thought Form:  Goal Directed    Insight:    Fair      Medication Review:   No changes to current psychiatric medication(s)     Medication Compliance:   Yes     Changes in Health Issues:   None reported     Chemical Use Review:   Substance Use: Chemical use reviewed, no active concerns identified      Tobacco Use: No current tobacco use.       Collateral Reports Completed:   Not Applicable    PLAN: (Client Tasks / Therapist Tasks / Other) Past sessions: Worked on tx plan in session today.  Client will engage in thought stopping process to develop awareness about when he starts to feel anger or irritability. Work on positive communication skills with wife, engage in positive communication sessions with her weekly and use \"I\" statements.  Continue to use mindfulness HOANG when client needs to regulate his mood. Scheduled additional counseling sessions. Client will start documenting and journaling feelings and thoughts in a journal and concentrate on strengths and 3 positives or things that he accomplishes each day and what bring him edward.  Client having difficulty in his relationship and continuing to share with his wife how he feels and thinks about issues in his marriage.  Continue to engage in activities that distract him form negative feelings and thoughts. Concentrate on good self care activities that improve his mood.Client is communicating better with his partner, they have scheduled couple's counseling.  Client is on a weight loss program and is feeling better about this and is also working on " "regulating his anger better and concentrating on positives about self and things that he accomplishes and journaling when he needs too. Client is having more difficulty with relationship issues, they are trying to find a marriage counselor they both can relate too.  They continue to look into this.  We discussed asking for what his wife wants.  Continuing to be open to her awareness about an issue, don't get caught up in who is right or wrong.  Bring down defenses, communicate about everyday things and continue practicing positive relationship skills.  Client to be open about feelings, thought, needs and wants.  Not to get caught up in giving in to make the peace because that can lead to resentment. Client working on exercise, getting together with friends, eating better and sleeping well.  Concentrating on positive affirmations and strengths. Client given assignment to try with wife \"going down memory lesia\"  Concentrate on positive aspects of the relationship.  Client was walking more, engaging with friends more and engaging in activities, had some anxiety one evening and not certain what creates this.  Will think about possibly journaling what triggers his anxiety in the future.  Concentrate on applying self compassion to core hurts that are triggered, ask spouse what she needs and wants from him, listen more and will not try and get stuck in powerlessness and more inner power to deal with issues.  Continue couple's counseling, think about getting a dog, and apply self compassion to core hurts and ask wife she wants from client.  Client to continue with exercise, positive activities that improve his mood, continued couple's therapy and engaging in positive communication strategies with wife.  Continue positive affirmations and concentrating on strengths daily. Client and wife decided to separate and most likely move towards a divorce.  This has been difficult for both of them but they have been amicable to each " other.  We processed and discussed stages of loss, continue to work on positives in his life like weight loss, exercise, getting a dog, concentrating on positives and strengths in life.  Client talked a little about his relationship with his sister and possibly repairing this in the future. Relationship has been strained for a couple of years. Current session: Client is doing well dealing with the decision to end his marriage.  Client is hoping to rescue a dog, continue with exercise, engage in CBT skills if needed, continue positive self care skills and reach out to support system if needed. Reviewed stages of grief and loss, client also is engaging in activities with friends that lift his mood.                       Hermilo Borja, [facility]                                                         ________________________________________________________________________    Treatment Plan    Client's Name: Torres Cuevas  YOB: 1980    Date: 1-16-18    DSM-V Diagnoses:  296.31 (F33.0) Major Depressive Disorder, Recurrent Episode, Mild _ and With anxious distress  Psychosocial & Contextual Factors: relationship stress, little interest in doing things, anger regulation issues, financial stress    WHODAS: Completed first session    Referral / Collaboration:  Referral to another professional/service is not indicated at this time..    Anticipated number of session or this episode of care: 15      MeasurableTreatment Goal(s) related to diagnosis / functional impairment(s)  Goal 1: Client will decrease symptoms of depression and anxiety and return to normal daily functioning    I will know I've met my goal when I feel better about myself and my relationship and have less anger in my life.      Objective #A (Client Action)    Client will use cognitive strategies identified in therapy to challenge anxious thoughts and depressive thoughts.  Status: Continued - Date(s):1-16-18     Intervention(s)  Therapist will  teach thought stopping process and CBT skills to regulate his mood.  Client will practice daily until it becomes automatic to regulate his mood better. .    Objective #B  Client will improve his overall self esteem and mood.  Status: Continued - Date(s): 1-16-18    Intervention(s)  Therapist will assign homework Complete self esteem schedule and work on areas that need improvement.  Client will concentrate on engaging in activities that improve his self esteem and write down what he accomplishes each day to improve his self esteem. .    Objective #C  Client will learn effective ways to regulate his anger..  Status: Continued - Date(s): 1-16-18     Intervention(s)  Therapist will assign homework Client will Learn the HEALS technique to regulate his anger daily or as needed. .        Client has reviewed and agreed to the above plan.      TORY Carcamo  January 16, 2018

## 2018-04-26 NOTE — MR AVS SNAPSHOT
"                  MRN:[id number]                      After Visit Summary   2018    Torres Cuevas    MRN: [id number]           Visit Information        Provider Department      2018 2:30 PM Hermilo Borja LONNIETORY [facility]      Your next 10 appointments already scheduled     May 14, 2018  4:30 PM CDT   Return Visit with Hermilo Borja TORY   [facility] ([facility])    [address] [place] [id number] [phone]              MyChart Information     [employer] lets you send messages to your doctor, view your test results, renew your prescriptions, schedule appointments and more. To sign up, go to [email or web address] . Click on \"Log in\" on the left side of the screen, which will take you to the Welcome page. Then click on \"Sign up Now\" on the right side of the page.     You will be asked to enter the access code listed below, as well as some personal information. Please follow the directions to create your username and password.     Your access code is: [email or web address]  Expires: 2018  2:06 PM     Your access code will  in 90 days. If you need help or a new code, please call your [place] clinic or [phone].        Care EveryWhere ID     This is your Care EveryWhere ID. This could be used by other organizations to access your [place] medical records  [id number]        Equal Access to Services     RAMESH MANLEY AH: Hadii jeison zavalao Sodayna, waaxda luqadaha, qaybta kaalmada adeegyada, olga corea. So [facility] [phone].    ATENCIÓN: Si habla español, tiene a camacho disposición servicios gratuitos de asistencia lingüística. Llame al [phone].    We comply with applicable federal civil rights laws and [place] laws. We do not discriminate on the basis of race, color, national origin, age, disability, sex, sexual orientation, or gender identity.       "

## 2018-04-27 ASSESSMENT — ANXIETY QUESTIONNAIRES: GAD7 TOTAL SCORE: 0

## 2018-04-27 ASSESSMENT — PATIENT HEALTH QUESTIONNAIRE - PHQ9: SUM OF ALL RESPONSES TO PHQ QUESTIONS 1-9: 3

## 2018-05-14 ENCOUNTER — OFFICE VISIT (OUTPATIENT)
Dept: PSYCHOLOGY | Facility: CLINIC | Age: 38
End: 2018-05-14
Payer: COMMERCIAL

## 2018-05-14 DIAGNOSIS — F33.0 MAJOR DEPRESSIVE DISORDER, RECURRENT EPISODE, MILD WITH ANXIOUS DISTRESS (H): Primary | ICD-10-CM

## 2018-05-14 PROCEDURE — 90834 PSYTX W PT 45 MINUTES: CPT | Performed by: SOCIAL WORKER

## 2018-05-14 NOTE — MR AVS SNAPSHOT
"                  MRN:[id number]                      After Visit Summary   2018    Torres Cuevas    MRN: [id number]           Visit Information        Provider Department      2018 4:30 PM Hermilo Borja [facility]      Your next 10 appointments already scheduled     May 31, 2018  1:30 PM CDT   Return Visit with Hermilo Borja [facility] ([facility])    [address] [place] [id number]   [phone]            2018 11:00 AM CDT   Return Visit with Hermilo Borja [facility] ([facility])    [address] [facility] [address]   [phone]              MyChart Information     [employer] lets you send messages to your doctor, view your test results, renew your prescriptions, schedule appointments and more. To sign up, go to [email or web address] . Click on \"Log in\" on the left side of the screen, which will take you to the Welcome page. Then click on \"Sign up Now\" on the right side of the page.     You will be asked to enter the access code listed below, as well as some personal information. Please follow the directions to create your username and password.     Your access code is: [email or web address]  Expires: 2018  2:06 PM     Your access code will  in 90 days. If you need help or a new code, please call your [place] clinic or [phone].        Care EveryWhere ID     This is your Care EveryWhere ID. This could be used by other organizations to access your [place] medical records  [id number]        Equal Access to Services     PRETTY [facility] : Hadii jeison velez Sodayna, waaxda luqadaha, qaybta kaalmada adeegyada, olga nelson . So [facility] [phone].    ATENCIÓN: Si habla español, tiene a camacho disposición servicios gratuitos de asistencia lingüística. Llame al " [phone].    We comply with applicable federal civil rights laws and [place] laws. We do not discriminate on the basis of race, color, national origin, age, disability, sex, sexual orientation, or gender identity.

## 2018-05-14 NOTE — PROGRESS NOTES
"                                               Progress Note    Client Name: Torres Cuevas  Date: 5-14-18         Service Type: Individual      Session Start Time: 3:30  Session End Time: 4:15      Session Length: 45     Session #: 10     Attendees: Client    Treatment Plan Last Reviewed: Started tx plan today 1-16-18  PHQ-9 / KANDY-7 : Complete next session.      DATA      Progress Since Last Session (Related to Symptoms / Goals / Homework):   Symptoms: Stable    Homework: Achieved / completed to satisfactionPrevious sessions: Worked on tx plan in session today.  Client will engage in thought stopping process to develop awareness about when he starts to feel anger or irritability. Work on positive communication skills with wife, engage in positive communication sessions with her weekly and use \"I\" statements.  Continue to use mindfulness HOANG when client needs to regulate his mood. Client will start documenting and journaling feelings and thoughts in a journal and concentrate on strengths and 3 positives or things that he accomplishes each day and what bring him edward.  Client having difficulty in his relationship and continuing to share with his wife how he feels and thinks about issues in his marriage.  Continue to engage in activities that distract him form negative feelings and thoughts.Client is communicating better with his partner, they have scheduled couple's counseling.  Client is on a weight loss program and is feeling better about this and is also working on regulating his anger better and concentrating on positives about self and things that he accomplishes and journaling when he needs too. Client is having more difficulty with relationship issues, they are trying to find a marriage counselor they both can relate too.  They continue to look into this.  We discussed asking for what his wife wants.  Continuing to be open to her awareness about an issue, don't get caught up in who is right or wrong.  Bring " "down defenses, communicate about everyday things and continue practicing positive relationship skills.  Client to be open about feelings, thought, needs and wants.  Not to get caught up in giving in to make the peace because that can lead to resentment. Client working on exercise, getting together with friends, eating better and sleeping well.  Concentrating on positive affirmations and strengths.  Client given assignment to try with wife \"going down memory lesia\"  Concentrate on positive aspects of the relationship. Client was walking more, engaging with friends more and engaging in activities, had some anxiety one evening and not certain what creates this.  Will think about possibly journaling what triggers his anxiety in the future.  Concentrate on applying self compassion to core hurts that are triggered, ask spouse what she needs and wants from him, listen more and will not try and get stuck in powerlessness and more inner power to deal with issues.  Continue couple's counseling, think about getting a dog, and apply self compassion to core hurts and ask wife she wants from client.  Client to continue with exercise, positive activities that improve his mood, continued couple's therapy and engaging in positive communication strategies with wife.  Continue positive affirmations and concentrating on strengths daily. Client and wife decided to separate and most likely move towards a divorce.  This has been difficult for both of them but they have been amicable to each other.  We processed and discussed stages of loss, continue to work on positives in his life like weight loss, exercise, getting a dog, concentrating on positives and strengths in life.  Client talked a little about his relationship with his sister and possibly repairing this in the future. Relationship has been strained for a couple of years.  Client is doing well dealing with the decision to end his marriage.  Client is hoping to rescue a dog, " continue with exercise, engage in CBT skills if needed, continue positive self care skills and reach out to support system if needed.Current session: Client went to finalize his divorce with his wife.  Mixed feelings about this that we processed in session.  Discussed the stages of loss and where he was at with this. Client go a rescue dog from a shelter and really looking forward to having a pet again.  Concentrated on affirmations and strengths and engaging in positive self care activities such as playing and walking his dog, exercise, continue weight mgmt goal, reach out to friends and schedule activities which creates accountability for client to follow through with things.       Episode of Care Goals: Achieved / completed to satisfaction - ACTION (Actively working towards change); Intervened by reinforcing change plan / affirming steps taken     Current / Ongoing Stressors and Concerns:              relationship stress, little interest in doing things, anger regulation issues, financial stress      Treatment Objective(s) Addressed in This Session:   use thought-stopping strategy daily to reduce intrusive thoughts  Worked on treatment plan goals in session  Journal thoughts and feelings, strengths and accomplishments each day and what brings him edward and happiness each day, Effective communication skills, exercise, weight loss, apply self compassion to core hurts  Processed grief and loss stages, concentrated on continuing positive and healthy activities in life, connect with support system and positive distraction activities.    Intervention:   Thought stopping process, effective communication skills    Journaling, distraction activities, self care activities  Anger regulation, concentrating on positive affirmations and strengths, apply self compassion to core hurts, exercise, weight loss  Grief and loss of marriage, CBT skills, connect with support system, positive self care activities  ASSESSMENT: Current  "Emotional / Mental Status (status of significant symptoms):   Risk status (Self / Other harm or suicidal ideation)   Client denies current fears or concerns for personal safety.   Client denies current or recent suicidal ideation or behaviors.   Client denies current or recent homicidal ideation or behaviors.   Client denies current or recent self injurious behavior or ideation.   Client denies other safety concerns.   A safety and risk management plan has not been developed at this time, however client was given the after-hours number / 911 should there be a change in any of these risk factors.     Appearance:   Appropriate    Eye Contact:   Good    Psychomotor Behavior: Normal    Attitude:   Cooperative    Orientation:   All   Speech    Rate / Production: Normal     Volume:  Normal    Mood:    Depressed  Sad    Affect:    Subdued    Thought Content:  Referential Thinking    Thought Form:  Goal Directed    Insight:    Fair      Medication Review:   No changes to current psychiatric medication(s)     Medication Compliance:   Yes     Changes in Health Issues:   None reported     Chemical Use Review:   Substance Use: Chemical use reviewed, no active concerns identified      Tobacco Use: No current tobacco use.       Collateral Reports Completed:   Not Applicable    PLAN: (Client Tasks / Therapist Tasks / Other) Past sessions: Worked on tx plan in session today.  Client will engage in thought stopping process to develop awareness about when he starts to feel anger or irritability. Work on positive communication skills with wife, engage in positive communication sessions with her weekly and use \"I\" statements.  Continue to use mindfulness HOANG when client needs to regulate his mood. Scheduled additional counseling sessions. Client will start documenting and journaling feelings and thoughts in a journal and concentrate on strengths and 3 positives or things that he accomplishes each day and what bring him edward.  Client " "having difficulty in his relationship and continuing to share with his wife how he feels and thinks about issues in his marriage.  Continue to engage in activities that distract him form negative feelings and thoughts. Concentrate on good self care activities that improve his mood.Client is communicating better with his partner, they have scheduled couple's counseling.  Client is on a weight loss program and is feeling better about this and is also working on regulating his anger better and concentrating on positives about self and things that he accomplishes and journaling when he needs too. Client is having more difficulty with relationship issues, they are trying to find a marriage counselor they both can relate too.  They continue to look into this.  We discussed asking for what his wife wants.  Continuing to be open to her awareness about an issue, don't get caught up in who is right or wrong.  Bring down defenses, communicate about everyday things and continue practicing positive relationship skills.  Client to be open about feelings, thought, needs and wants.  Not to get caught up in giving in to make the peace because that can lead to resentment. Client working on exercise, getting together with friends, eating better and sleeping well.  Concentrating on positive affirmations and strengths. Client given assignment to try with wife \"going down memory lesia\"  Concentrate on positive aspects of the relationship.  Client was walking more, engaging with friends more and engaging in activities, had some anxiety one evening and not certain what creates this.  Will think about possibly journaling what triggers his anxiety in the future.  Concentrate on applying self compassion to core hurts that are triggered, ask spouse what she needs and wants from him, listen more and will not try and get stuck in powerlessness and more inner power to deal with issues.  Continue couple's counseling, think about getting a dog, and " apply self compassion to core hurts and ask wife she wants from client.  Client to continue with exercise, positive activities that improve his mood, continued couple's therapy and engaging in positive communication strategies with wife.  Continue positive affirmations and concentrating on strengths daily. Client and wife decided to separate and most likely move towards a divorce.  This has been difficult for both of them but they have been amicable to each other.  We processed and discussed stages of loss, continue to work on positives in his life like weight loss, exercise, getting a dog, concentrating on positives and strengths in life.  Client talked a little about his relationship with his sister and possibly repairing this in the future. Relationship has been strained for a couple of years. Client is doing well dealing with the decision to end his marriage.  Client is hoping to rescue a dog, continue with exercise, engage in CBT skills if needed, continue positive self care skills and reach out to support system if needed. Reviewed stages of grief and loss, client also is engaging in activities with friends that lift his mood. Current session: Client went to finalize his divorce with his wife.  Mixed feelings about this that we processed in session.  Discussed the stages of loss and where he was at with this. Client go a rescue dog from a shelter and really looking forward to having a pet again.  Concentrated on affirmations and strengths and engaging in positive self care activities such as playing and walking his dog, exercise, continue weight mgmt goal, reach out to friends and schedule activities which creates accountability for client to follow through with things.                       Hermilo Borja, [facility]                                                         ________________________________________________________________________    Treatment Plan    Client's Name: Torres Cuevas  Date Of  Birth: 1980    Date: 1-16-18    DSM-V Diagnoses:  296.31 (F33.0) Major Depressive Disorder, Recurrent Episode, Mild _ and With anxious distress  Psychosocial & Contextual Factors: relationship stress, little interest in doing things, anger regulation issues, financial stress    WHODAS: Completed first session    Referral / Collaboration:  Referral to another professional/service is not indicated at this time..    Anticipated number of session or this episode of care: 15      MeasurableTreatment Goal(s) related to diagnosis / functional impairment(s)  Goal 1: Client will decrease symptoms of depression and anxiety and return to normal daily functioning    I will know I've met my goal when I feel better about myself and my relationship and have less anger in my life.      Objective #A (Client Action)    Client will use cognitive strategies identified in therapy to challenge anxious thoughts and depressive thoughts.  Status: Continued - Date(s):1-16-18     Intervention(s)  Therapist will teach thought stopping process and CBT skills to regulate his mood.  Client will practice daily until it becomes automatic to regulate his mood better. .    Objective #B  Client will improve his overall self esteem and mood.  Status: Continued - Date(s): 1-16-18    Intervention(s)  Therapist will assign homework Complete self esteem schedule and work on areas that need improvement.  Client will concentrate on engaging in activities that improve his self esteem and write down what he accomplishes each day to improve his self esteem. .    Objective #C  Client will learn effective ways to regulate his anger..  Status: Continued - Date(s): 1-16-18     Intervention(s)  Therapist will assign homework Client will Learn the HEALS technique to regulate his anger daily or as needed. .        Client has reviewed and agreed to the above plan.      Hermilo Borja, [facility]  January 16, 2018

## 2018-05-31 ENCOUNTER — OFFICE VISIT (OUTPATIENT)
Dept: PSYCHOLOGY | Facility: CLINIC | Age: 38
End: 2018-05-31
Payer: COMMERCIAL

## 2018-05-31 DIAGNOSIS — F33.0 MAJOR DEPRESSIVE DISORDER, RECURRENT EPISODE, MILD WITH ANXIOUS DISTRESS (H): Primary | ICD-10-CM

## 2018-05-31 PROCEDURE — 90834 PSYTX W PT 45 MINUTES: CPT | Performed by: SOCIAL WORKER

## 2018-05-31 ASSESSMENT — ANXIETY QUESTIONNAIRES
GAD7 TOTAL SCORE: 0
5. BEING SO RESTLESS THAT IT IS HARD TO SIT STILL: NOT AT ALL
6. BECOMING EASILY ANNOYED OR IRRITABLE: NOT AT ALL
2. NOT BEING ABLE TO STOP OR CONTROL WORRYING: NOT AT ALL
3. WORRYING TOO MUCH ABOUT DIFFERENT THINGS: NOT AT ALL
IF YOU CHECKED OFF ANY PROBLEMS ON THIS QUESTIONNAIRE, HOW DIFFICULT HAVE THESE PROBLEMS MADE IT FOR YOU TO DO YOUR WORK, TAKE CARE OF THINGS AT HOME, OR GET ALONG WITH OTHER PEOPLE: NOT DIFFICULT AT ALL
7. FEELING AFRAID AS IF SOMETHING AWFUL MIGHT HAPPEN: NOT AT ALL
1. FEELING NERVOUS, ANXIOUS, OR ON EDGE: NOT AT ALL

## 2018-05-31 ASSESSMENT — PATIENT HEALTH QUESTIONNAIRE - PHQ9: 5. POOR APPETITE OR OVEREATING: NOT AT ALL

## 2018-05-31 NOTE — MR AVS SNAPSHOT
MRN:[id number]                      After Visit Summary   5/31/2018    Torres Cuevas    MRN: [id number]           Visit Information        Provider Department      5/31/2018 1:30 PM Hermilo Borja LICSW [facility] Generic      Your next 10 appointments already scheduled     Jun 18, 2018 11:00 AM CDT   Return Visit with TORY Canchola   [facility] ([facility])    [address] [place] [id number]   [phone]              Care EveryWhere ID     This is your Care EveryWhere ID. This could be used by other organizations to access your [facility] medical records  [id number]        Equal Access to Services     RAMESH MANLEY : Ludwig Andrew, rosalia javier, juan jose cole, olga corea. So [facility] [phone].    ATENCIÓN: Si habla español, tiene a camacho disposición servicios gratuitos de asistencia lingüística. Llame al [phone].    We comply with applicable federal civil rights laws and [place] laws. We do not discriminate on the basis of race, color, national origin, age, disability, sex, sexual orientation, or gender identity.

## 2018-05-31 NOTE — PROGRESS NOTES
"                                               Progress Note    Client Name: Torres Cuevas  Date: 5-31-18         Service Type: Individual      Session Start Time: 1:30  Session End Time: 2:15      Session Length: 45     Session #: 11     Attendees: Client    Treatment Plan Last Reviewed: Started tx plan today 1-16-18  PHQ-9 / KANDY-7 : Completed this session-improved scores on both screenings.      DATA      Progress Since Last Session (Related to Symptoms / Goals / Homework):   Symptoms: Improved    Homework: Achieved / completed to satisfactionPrevious sessions: Worked on tx plan in session today.  Client will engage in thought stopping process to develop awareness about when he starts to feel anger or irritability. Work on positive communication skills with wife, engage in positive communication sessions with her weekly and use \"I\" statements.  Continue to use mindfulness HOANG when client needs to regulate his mood. Client will start documenting and journaling feelings and thoughts in a journal and concentrate on strengths and 3 positives or things that he accomplishes each day and what bring him edward.  Client having difficulty in his relationship and continuing to share with his wife how he feels and thinks about issues in his marriage.  Continue to engage in activities that distract him form negative feelings and thoughts.Client is communicating better with his partner, they have scheduled couple's counseling.  Client is on a weight loss program and is feeling better about this and is also working on regulating his anger better and concentrating on positives about self and things that he accomplishes and journaling when he needs too. Client is having more difficulty with relationship issues, they are trying to find a marriage counselor they both can relate too.  They continue to look into this.  We discussed asking for what his wife wants.  Continuing to be open to her awareness about an issue, don't get caught " "up in who is right or wrong.  Bring down defenses, communicate about everyday things and continue practicing positive relationship skills.  Client to be open about feelings, thought, needs and wants.  Not to get caught up in giving in to make the peace because that can lead to resentment. Client working on exercise, getting together with friends, eating better and sleeping well.  Concentrating on positive affirmations and strengths.  Client given assignment to try with wife \"going down memory lesia\"  Concentrate on positive aspects of the relationship. Client was walking more, engaging with friends more and engaging in activities, had some anxiety one evening and not certain what creates this.  Will think about possibly journaling what triggers his anxiety in the future.  Concentrate on applying self compassion to core hurts that are triggered, ask spouse what she needs and wants from him, listen more and will not try and get stuck in powerlessness and more inner power to deal with issues.  Continue couple's counseling, think about getting a dog, and apply self compassion to core hurts and ask wife she wants from client.  Client to continue with exercise, positive activities that improve his mood, continued couple's therapy and engaging in positive communication strategies with wife.  Continue positive affirmations and concentrating on strengths daily. Client and wife decided to separate and most likely move towards a divorce.  This has been difficult for both of them but they have been amicable to each other.  We processed and discussed stages of loss, continue to work on positives in his life like weight loss, exercise, getting a dog, concentrating on positives and strengths in life.  Client talked a little about his relationship with his sister and possibly repairing this in the future. Relationship has been strained for a couple of years.  Client is doing well dealing with the decision to end his marriage.  " Client is hoping to rescue a dog, continue with exercise, engage in CBT skills if needed, continue positive self care skills and reach out to support system if needed. Client went to finalize his divorce with his wife.  Mixed feelings about this that we processed in session.  Discussed the stages of loss and where he was at with this. Client go a rescue dog from a shelter and really looking forward to having a pet again.  Concentrated on affirmations and strengths and engaging in positive self care activities such as playing and walking his dog, exercise, continue weight mgmt goal, reach out to friends and schedule activities which creates accountability for client to follow through with things.Current session: Client has finalized his divorce.  Is connecting with family and support system.  We processed stages of loss and client is working through the stages.  Client got a dog which is helping him exercise more and the dog has been a positive emotional support to client.  Client has positive activities to look forward to over the summer, has anew roommate moving in the middle of June.  Ex-wife will be moving out then.  Client will continue to remain active, be aware of any depression symptoms that may pop up and continue to concentrate on positive healing activities to help him through the loss.        Episode of Care Goals: Achieved / completed to satisfaction - ACTION (Actively working towards change); Intervened by reinforcing change plan / affirming steps taken     Current / Ongoing Stressors and Concerns:              relationship stress, little interest in doing things, anger regulation issues, financial stress      Treatment Objective(s) Addressed in This Session:   use thought-stopping strategy daily to reduce intrusive thoughts  Worked on treatment plan goals in session  Journal thoughts and feelings, strengths and accomplishments each day and what brings him edward and happiness each day, Effective  communication skills, exercise, weight loss, apply self compassion to core hurts  Processed grief and loss stages, concentrated on continuing positive and healthy activities in life, connect with support system and positive distraction activities.    Intervention:   Thought stopping process, effective communication skills    Journaling, distraction activities, self care activities  Anger regulation, concentrating on positive affirmations and strengths, apply self compassion to core hurts, exercise, weight loss  Grief and loss of marriage, CBT skills, connect with support system, positive self care activities  ASSESSMENT: Current Emotional / Mental Status (status of significant symptoms):   Risk status (Self / Other harm or suicidal ideation)   Client denies current fears or concerns for personal safety.   Client denies current or recent suicidal ideation or behaviors.   Client denies current or recent homicidal ideation or behaviors.   Client denies current or recent self injurious behavior or ideation.   Client denies other safety concerns.   A safety and risk management plan has not been developed at this time, however client was given the after-hours number / 911 should there be a change in any of these risk factors.     Appearance:   Appropriate    Eye Contact:   Good    Psychomotor Behavior: Normal    Attitude:   Cooperative    Orientation:   All   Speech    Rate / Production: Normal     Volume:  Normal    Mood:    Sad    Affect:    Subdued    Thought Content:  Referential Thinking    Thought Form:  Goal Directed    Insight:    Fair      Medication Review:   No changes to current psychiatric medication(s)     Medication Compliance:   Yes     Changes in Health Issues:   None reported     Chemical Use Review:   Substance Use: Chemical use reviewed, no active concerns identified      Tobacco Use: No current tobacco use.       Collateral Reports Completed:   Not Applicable    PLAN: (Client Tasks / Therapist Tasks /  "Other) Past sessions: Worked on tx plan in session today.  Client will engage in thought stopping process to develop awareness about when he starts to feel anger or irritability. Work on positive communication skills with wife, engage in positive communication sessions with her weekly and use \"I\" statements.  Continue to use mindfulness HOANG when client needs to regulate his mood. Scheduled additional counseling sessions. Client will start documenting and journaling feelings and thoughts in a journal and concentrate on strengths and 3 positives or things that he accomplishes each day and what bring him edward.  Client having difficulty in his relationship and continuing to share with his wife how he feels and thinks about issues in his marriage.  Continue to engage in activities that distract him form negative feelings and thoughts. Concentrate on good self care activities that improve his mood.Client is communicating better with his partner, they have scheduled couple's counseling.  Client is on a weight loss program and is feeling better about this and is also working on regulating his anger better and concentrating on positives about self and things that he accomplishes and journaling when he needs too. Client is having more difficulty with relationship issues, they are trying to find a marriage counselor they both can relate too.  They continue to look into this.  We discussed asking for what his wife wants.  Continuing to be open to her awareness about an issue, don't get caught up in who is right or wrong.  Bring down defenses, communicate about everyday things and continue practicing positive relationship skills.  Client to be open about feelings, thought, needs and wants.  Not to get caught up in giving in to make the peace because that can lead to resentment. Client working on exercise, getting together with friends, eating better and sleeping well.  Concentrating on positive affirmations and strengths. Client " "given assignment to try with wife \"going down memory lesia\"  Concentrate on positive aspects of the relationship.  Client was walking more, engaging with friends more and engaging in activities, had some anxiety one evening and not certain what creates this.  Will think about possibly journaling what triggers his anxiety in the future.  Concentrate on applying self compassion to core hurts that are triggered, ask spouse what she needs and wants from him, listen more and will not try and get stuck in powerlessness and more inner power to deal with issues.  Continue couple's counseling, think about getting a dog, and apply self compassion to core hurts and ask wife she wants from client.  Client to continue with exercise, positive activities that improve his mood, continued couple's therapy and engaging in positive communication strategies with wife.  Continue positive affirmations and concentrating on strengths daily. Client and wife decided to separate and most likely move towards a divorce.  This has been difficult for both of them but they have been amicable to each other.  We processed and discussed stages of loss, continue to work on positives in his life like weight loss, exercise, getting a dog, concentrating on positives and strengths in life.  Client talked a little about his relationship with his sister and possibly repairing this in the future. Relationship has been strained for a couple of years. Client is doing well dealing with the decision to end his marriage.  Client is hoping to rescue a dog, continue with exercise, engage in CBT skills if needed, continue positive self care skills and reach out to support system if needed. Reviewed stages of grief and loss, client also is engaging in activities with friends that lift his mood. Client went to finalize his divorce with his wife.  Mixed feelings about this that we processed in session.  Discussed the stages of loss and where he was at with this. Client " go a rescue dog from a shelter and really looking forward to having a pet again.  Concentrated on affirmations and strengths and engaging in positive self care activities such as playing and walking his dog, exercise, continue weight mgmt goal, reach out to friends and schedule activities which creates accountability for client to follow through with things. Current session: Client has finalized his divorce.  Is connecting with family and support system.  We processed stages of loss and client is working through the stages.  Client got a dog which is helping him exercise more and the dog has been a positive emotional support to client.  Client has positive activities to look forward to over the summer, has anew roommate moving in the middle of June.  Ex-wife will be moving out then.  Client will continue to remain active, be aware of any depression symptoms that may pop up and continue to concentrate on positive healing activities to help him through the loss.                          Hermilo Borja, [facility]                                                         ________________________________________________________________________    Treatment Plan    Client's Name: Torres Cuevas  YOB: 1980    Date: 1-16-18    DSM-V Diagnoses:  296.31 (F33.0) Major Depressive Disorder, Recurrent Episode, Mild _ and With anxious distress  Psychosocial & Contextual Factors: relationship stress, little interest in doing things, anger regulation issues, financial stress    WHODAS: Completed first session    Referral / Collaboration:  Referral to another professional/service is not indicated at this time..    Anticipated number of session or this episode of care: 15      MeasurableTreatment Goal(s) related to diagnosis / functional impairment(s)  Goal 1: Client will decrease symptoms of depression and anxiety and return to normal daily functioning    I will know I've met my goal when I feel better about myself and my  relationship and have less anger in my life.      Objective #A (Client Action)    Client will use cognitive strategies identified in therapy to challenge anxious thoughts and depressive thoughts.  Status: Continued - Date(s):1-16-18     Intervention(s)  Therapist will teach thought stopping process and CBT skills to regulate his mood.  Client will practice daily until it becomes automatic to regulate his mood better. .    Objective #B  Client will improve his overall self esteem and mood.  Status: Continued - Date(s): 1-16-18    Intervention(s)  Therapist will assign homework Complete self esteem schedule and work on areas that need improvement.  Client will concentrate on engaging in activities that improve his self esteem and write down what he accomplishes each day to improve his self esteem. .    Objective #C  Client will learn effective ways to regulate his anger..  Status: Continued - Date(s): 1-16-18     Intervention(s)  Therapist will assign homework Client will Learn the HEALS technique to regulate his anger daily or as needed. .        Client has reviewed and agreed to the above plan.      TORY Carcamo  January 16, 2018

## 2018-06-01 ASSESSMENT — ANXIETY QUESTIONNAIRES: GAD7 TOTAL SCORE: 0

## 2018-06-01 ASSESSMENT — PATIENT HEALTH QUESTIONNAIRE - PHQ9: SUM OF ALL RESPONSES TO PHQ QUESTIONS 1-9: 0

## 2018-06-18 ENCOUNTER — OFFICE VISIT (OUTPATIENT)
Dept: PSYCHOLOGY | Facility: CLINIC | Age: 38
End: 2018-06-18
Payer: COMMERCIAL

## 2018-06-18 DIAGNOSIS — F33.0 MAJOR DEPRESSIVE DISORDER, RECURRENT EPISODE, MILD WITH ANXIOUS DISTRESS (H): Primary | ICD-10-CM

## 2018-06-18 PROCEDURE — 90834 PSYTX W PT 45 MINUTES: CPT | Performed by: SOCIAL WORKER

## 2018-06-18 NOTE — PROGRESS NOTES
"                                               Progress Note    Client Name: Torres Cuevas  Date: 6-18-18         Service Type: Individual      Session Start Time: 11:15  Session End Time: 12:00      Session Length: 45     Session #: 12     Attendees: Client    Treatment Plan Last Reviewed: Started tx plan today 1-16-18  PHQ-9 / KANDY-7 : Complete next session.      DATA      Progress Since Last Session (Related to Symptoms / Goals / Homework):   Symptoms: Stable    Homework: Achieved / completed to satisfactionPrevious sessions: Worked on tx plan in session today.  Client will engage in thought stopping process to develop awareness about when he starts to feel anger or irritability. Work on positive communication skills with wife, engage in positive communication sessions with her weekly and use \"I\" statements.  Continue to use mindfulness HOANG when client needs to regulate his mood. Client will start documenting and journaling feelings and thoughts in a journal and concentrate on strengths and 3 positives or things that he accomplishes each day and what bring him edward.  Client having difficulty in his relationship and continuing to share with his wife how he feels and thinks about issues in his marriage.  Continue to engage in activities that distract him form negative feelings and thoughts.Client is communicating better with his partner, they have scheduled couple's counseling.  Client is on a weight loss program and is feeling better about this and is also working on regulating his anger better and concentrating on positives about self and things that he accomplishes and journaling when he needs too. Client is having more difficulty with relationship issues, they are trying to find a marriage counselor they both can relate too.  They continue to look into this.  We discussed asking for what his wife wants.  Continuing to be open to her awareness about an issue, don't get caught up in who is right or wrong.  Bring " "down defenses, communicate about everyday things and continue practicing positive relationship skills.  Client to be open about feelings, thought, needs and wants.  Not to get caught up in giving in to make the peace because that can lead to resentment. Client working on exercise, getting together with friends, eating better and sleeping well.  Concentrating on positive affirmations and strengths.  Client given assignment to try with wife \"going down memory lesia\"  Concentrate on positive aspects of the relationship. Client was walking more, engaging with friends more and engaging in activities, had some anxiety one evening and not certain what creates this.  Will think about possibly journaling what triggers his anxiety in the future.  Concentrate on applying self compassion to core hurts that are triggered, ask spouse what she needs and wants from him, listen more and will not try and get stuck in powerlessness and more inner power to deal with issues.  Continue couple's counseling, think about getting a dog, and apply self compassion to core hurts and ask wife she wants from client.  Client to continue with exercise, positive activities that improve his mood, continued couple's therapy and engaging in positive communication strategies with wife.  Continue positive affirmations and concentrating on strengths daily. Client and wife decided to separate and most likely move towards a divorce.  This has been difficult for both of them but they have been amicable to each other.  We processed and discussed stages of loss, continue to work on positives in his life like weight loss, exercise, getting a dog, concentrating on positives and strengths in life.  Client talked a little about his relationship with his sister and possibly repairing this in the future. Relationship has been strained for a couple of years.  Client is doing well dealing with the decision to end his marriage.  Client is hoping to rescue a dog, " "continue with exercise, engage in CBT skills if needed, continue positive self care skills and reach out to support system if needed. Client went to finalize his divorce with his wife.  Mixed feelings about this that we processed in session.  Discussed the stages of loss and where he was at with this. Client go a rescue dog from a shelter and really looking forward to having a pet again.  Concentrated on affirmations and strengths and engaging in positive self care activities such as playing and walking his dog, exercise, continue weight mgmt goal, reach out to friends and schedule activities which creates accountability for client to follow through with things. Client has finalized his divorce.  Is connecting with family and support system.  We processed stages of loss and client is working through the stages.  Client got a dog which is helping him exercise more and the dog has been a positive emotional support to client.  Client has positive activities to look forward to over the summer, has anew roommate moving in the middle of June.  Ex-wife will be moving out then.  Client will continue to remain active, be aware of any depression symptoms that may pop up and continue to concentrate on positive healing activities to help him through the loss. Current session: Client has had some up's and down's in regard to how he is feeling about his divorce, some sadness but not to much of it.  Is sorting out all the issues related to ending his marriage.  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  All of these activities are difficult for client to engage in some really positive self care for self.  Uncertain about having a roommate move in so soon and may think about a later date for this to happen.  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  Apply self compassion to core hurts that are triggered, such as feeling disregarded or disrespected, let go of the resentment and work through for self.       Episode of Care Goals: Achieved / completed to satisfaction - ACTION (Actively working towards change); Intervened by reinforcing change plan / affirming steps taken     Current / Ongoing Stressors and Concerns:              relationship stress, little interest in doing things, anger regulation issues, financial stress      Treatment Objective(s) Addressed in This Session:   use thought-stopping strategy daily to reduce intrusive thoughts  Worked on treatment plan goals in session  Journal thoughts and feelings, strengths and accomplishments each day and what brings him edward and happiness each day,  exercise, weight loss, Processed grief and loss stages, concentrated on continuing positive and healthy activities in life, connect with support system and positive distraction activities. Engage in positive and healthy communication skills, apply self compassion to core hurts.    Intervention:   Thought stopping process, effective communication skills    Journaling, distraction activities, self care activities  Anger regulation, concentrating on positive affirmations and strengths, apply self compassion to core hurts, exercise, weight loss  Grief and loss of marriage, CBT skills, connect with support system, positive self care activities, self compassion, positive communication and assertiveness skills  ASSESSMENT: Current Emotional / Mental Status (status of significant symptoms):   Risk status (Self / Other harm or suicidal ideation)   Client denies current fears or concerns for personal safety.   Client denies current or recent suicidal ideation or behaviors.   Client denies current or recent homicidal ideation or behaviors.   Client denies current or recent self injurious behavior or ideation.   Client denies other safety  "concerns.   A safety and risk management plan has not been developed at this time, however client was given the after-hours number / 911 should there be a change in any of these risk factors.     Appearance:   Appropriate    Eye Contact:   Good    Psychomotor Behavior: Normal    Attitude:   Cooperative    Orientation:   All   Speech    Rate / Production: Normal     Volume:  Normal    Mood:    Depressed  Sad    Affect:    Subdued    Thought Content:  Referential Thinking    Thought Form:  Goal Directed    Insight:    Fair      Medication Review:   No changes to current psychiatric medication(s)     Medication Compliance:   Yes     Changes in Health Issues:   None reported     Chemical Use Review:   Substance Use: Chemical use reviewed, no active concerns identified      Tobacco Use: No current tobacco use.       Collateral Reports Completed:   Not Applicable    PLAN: (Client Tasks / Therapist Tasks / Other) Past sessions: Worked on tx plan in session today.  Client will engage in thought stopping process to develop awareness about when he starts to feel anger or irritability. Work on positive communication skills with wife, engage in positive communication sessions with her weekly and use \"I\" statements.  Continue to use mindfulness HOANG when client needs to regulate his mood. Scheduled additional counseling sessions. Client will start documenting and journaling feelings and thoughts in a journal and concentrate on strengths and 3 positives or things that he accomplishes each day and what bring him edward.  Client having difficulty in his relationship and continuing to share with his wife how he feels and thinks about issues in his marriage.  Continue to engage in activities that distract him form negative feelings and thoughts. Concentrate on good self care activities that improve his mood.Client is communicating better with his partner, they have scheduled couple's counseling.  Client is on a weight loss program and is " "feeling better about this and is also working on regulating his anger better and concentrating on positives about self and things that he accomplishes and journaling when he needs too. Client is having more difficulty with relationship issues, they are trying to find a marriage counselor they both can relate too.  They continue to look into this.  We discussed asking for what his wife wants.  Continuing to be open to her awareness about an issue, don't get caught up in who is right or wrong.  Bring down defenses, communicate about everyday things and continue practicing positive relationship skills.  Client to be open about feelings, thought, needs and wants.  Not to get caught up in giving in to make the peace because that can lead to resentment. Client working on exercise, getting together with friends, eating better and sleeping well.  Concentrating on positive affirmations and strengths. Client given assignment to try with wife \"going down memory lesia\"  Concentrate on positive aspects of the relationship.  Client was walking more, engaging with friends more and engaging in activities, had some anxiety one evening and not certain what creates this.  Will think about possibly journaling what triggers his anxiety in the future.  Concentrate on applying self compassion to core hurts that are triggered, ask spouse what she needs and wants from him, listen more and will not try and get stuck in powerlessness and more inner power to deal with issues.  Continue couple's counseling, think about getting a dog, and apply self compassion to core hurts and ask wife she wants from client.  Client to continue with exercise, positive activities that improve his mood, continued couple's therapy and engaging in positive communication strategies with wife.  Continue positive affirmations and concentrating on strengths daily. Client and wife decided to separate and most likely move towards a divorce.  This has been difficult for " both of them but they have been amicable to each other.  We processed and discussed stages of loss, continue to work on positives in his life like weight loss, exercise, getting a dog, concentrating on positives and strengths in life.  Client talked a little about his relationship with his sister and possibly repairing this in the future. Relationship has been strained for a couple of years. Client is doing well dealing with the decision to end his marriage.  Client is hoping to rescue a dog, continue with exercise, engage in CBT skills if needed, continue positive self care skills and reach out to support system if needed. Reviewed stages of grief and loss, client also is engaging in activities with friends that lift his mood. Client went to finalize his divorce with his wife.  Mixed feelings about this that we processed in session.  Discussed the stages of loss and where he was at with this. Client go a rescue dog from a shelter and really looking forward to having a pet again.  Concentrated on affirmations and strengths and engaging in positive self care activities such as playing and walking his dog, exercise, continue weight mgmt goal, reach out to friends and schedule activities which creates accountability for client to follow through with things.  Client has finalized his divorce.  Is connecting with family and support system.  We processed stages of loss and client is working through the stages.  Client got a dog which is helping him exercise more and the dog has been a positive emotional support to client.  Client has positive activities to look forward to over the summer, has anew roommate moving in the middle of June.  Ex-wife will be moving out then.  Client will continue to remain active, be aware of any depression symptoms that may pop up and continue to concentrate on positive healing activities to help him through the loss. Current session: Client has had some up's and down's in regard to how he is  "feeling about his divorce, some sadness but not to much of it.  Is sorting out all the issues related to ending his marriage.  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  All of these activities are difficult for client to engage in some really positive self care for self.  Uncertain about having a roommate move in so soon and may think about a later date for this to happen.  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  Apply self compassion to core hurts that are triggered, such as feeling disregarded or disrespected, let go of the resentment and work through for self.                          Hermilo Borja, [facility]                                                         ________________________________________________________________________    Treatment Plan    Client's Name: Torres Cuevas  YOB: 1980    Date: 1-16-18    DSM-V Diagnoses:  296.31 (F33.0) Major Depressive Disorder, Recurrent Episode, Mild _ and With anxious distress  Psychosocial & Contextual Factors: relationship stress, little interest in doing things, anger regulation issues, financial stress    WHODAS: Completed first session    Referral / Collaboration:  Referral to another professional/service is not indicated at this time..    Anticipated number of session or this episode of care: 15      MeasurableTreatment Goal(s) related to diagnosis / functional impairment(s)  Goal 1: Client will decrease symptoms of depression and anxiety and return to normal daily functioning    I will know I've met my goal when I feel better about myself and my relationship and have less anger in my life.      Objective #A (Client Action)    Client will use cognitive strategies identified in therapy to challenge anxious thoughts and depressive " thoughts.  Status: Continued - Date(s):1-16-18     Intervention(s)  Therapist will teach thought stopping process and CBT skills to regulate his mood.  Client will practice daily until it becomes automatic to regulate his mood better. .    Objective #B  Client will improve his overall self esteem and mood.  Status: Continued - Date(s): 1-16-18    Intervention(s)  Therapist will assign homework Complete self esteem schedule and work on areas that need improvement.  Client will concentrate on engaging in activities that improve his self esteem and write down what he accomplishes each day to improve his self esteem. .    Objective #C  Client will learn effective ways to regulate his anger..  Status: Continued - Date(s): 1-16-18     Intervention(s)  Therapist will assign homework Client will Learn the HEALS technique to regulate his anger daily or as needed. .        Client has reviewed and agreed to the above plan.      Hermilo Borja [facility]  January 16, 2018

## 2018-06-18 NOTE — MR AVS SNAPSHOT
MRN:[id number]                      After Visit Summary   6/18/2018    Torres Cuevas    MRN: [id number]           Visit Information        Provider Department      6/18/2018 11:00 AM Hermilo Borja LICSW [facility]      Your next 10 appointments already scheduled     Jul 17, 2018  2:00 PM CDT   Return Visit with TORY Canchola   [facility] ([facility])    [address] [place] [id number]   [phone]              Care EveryWhere ID     This is your Care EveryWhere ID. This could be used by other organizations to access your [place] medical records  [id number]        Equal Access to Services     RAMESH MANLEY : Ludwig Andrew, rosalia javier, juan jose cole, olga corea. So [facility] [phone].    ATENCIÓN: Si habla español, tiene a camacho disposición servicios gratuitos de asistencia lingüística. Llame al [phone].    We comply with applicable federal civil rights laws and [place] laws. We do not discriminate on the basis of race, color, national origin, age, disability, sex, sexual orientation, or gender identity.

## 2018-07-17 ENCOUNTER — OFFICE VISIT (OUTPATIENT)
Dept: PSYCHOLOGY | Facility: CLINIC | Age: 38
End: 2018-07-17
Payer: COMMERCIAL

## 2018-07-17 DIAGNOSIS — F33.0 MAJOR DEPRESSIVE DISORDER, RECURRENT EPISODE, MILD WITH ANXIOUS DISTRESS (H): Primary | ICD-10-CM

## 2018-07-17 PROCEDURE — 90834 PSYTX W PT 45 MINUTES: CPT | Performed by: SOCIAL WORKER

## 2018-07-17 ASSESSMENT — ANXIETY QUESTIONNAIRES
1. FEELING NERVOUS, ANXIOUS, OR ON EDGE: NOT AT ALL
5. BEING SO RESTLESS THAT IT IS HARD TO SIT STILL: NOT AT ALL
2. NOT BEING ABLE TO STOP OR CONTROL WORRYING: NOT AT ALL
IF YOU CHECKED OFF ANY PROBLEMS ON THIS QUESTIONNAIRE, HOW DIFFICULT HAVE THESE PROBLEMS MADE IT FOR YOU TO DO YOUR WORK, TAKE CARE OF THINGS AT HOME, OR GET ALONG WITH OTHER PEOPLE: NOT DIFFICULT AT ALL
3. WORRYING TOO MUCH ABOUT DIFFERENT THINGS: NOT AT ALL
GAD7 TOTAL SCORE: 1
7. FEELING AFRAID AS IF SOMETHING AWFUL MIGHT HAPPEN: NOT AT ALL
6. BECOMING EASILY ANNOYED OR IRRITABLE: SEVERAL DAYS

## 2018-07-17 ASSESSMENT — PATIENT HEALTH QUESTIONNAIRE - PHQ9: 5. POOR APPETITE OR OVEREATING: NOT AT ALL

## 2018-07-17 NOTE — MR AVS SNAPSHOT
MRN:[id number]                      After Visit Summary   7/17/2018    Torres Cuevas    MRN: [id number]           Visit Information        Provider Department      7/17/2018 2:00 PM Hermilo Borja LICSW [facility] Generic      Your next 10 appointments already scheduled     Aug 20, 2018  4:30 PM CDT   Return Visit with Hermilo LONNIE TORY Borja   [facility] ([facility])    [address] [place] [id number]   [phone]              Care EveryWhere ID     This is your Care EveryWhere ID. This could be used by other organizations to access your [place] medical records  [id number]        Equal Access to Services     RAMESH MANLEY : Ludwig Andrew, rosalia javier, juan jose cole, olga corea. So [facility] [phone].    ATENCIÓN: Si habla español, tiene a camacho disposición servicios gratuitos de asistencia lingüística. Llame al [phone].    We comply with applicable federal civil rights laws and [place] laws. We do not discriminate on the basis of race, color, national origin, age, disability, sex, sexual orientation, or gender identity.

## 2018-07-17 NOTE — PROGRESS NOTES
"                                               Progress Note    Client Name: Torres Cuevas  Date: 7-17-18         Service Type: Individual      Session Start Time: 2:00  Session End Time: 2:45      Session Length: 45     Session #: 14     Attendees: Client    Treatment Plan Last Reviewed: Started tx plan today 1-16-18, 7-17-18  PHQ-9 / KANDY-7 : Completed this session. Improved screenings this session     DATA      Progress Since Last Session (Related to Symptoms / Goals / Homework):   Symptoms: Improving less depression symptoms and anxiety symptoms this session    Homework: Achieved / completed to satisfactionPrevious sessions: Worked on tx plan in session today.  Client will engage in thought stopping process to develop awareness about when he starts to feel anger or irritability. Work on positive communication skills with wife, engage in positive communication sessions with her weekly and use \"I\" statements.  Continue to use mindfulness HOANG when client needs to regulate his mood. Client will start documenting and journaling feelings and thoughts in a journal and concentrate on strengths and 3 positives or things that he accomplishes each day and what bring him edward.  Client having difficulty in his relationship and continuing to share with his wife how he feels and thinks about issues in his marriage.  Continue to engage in activities that distract him form negative feelings and thoughts.Client is communicating better with his partner, they have scheduled couple's counseling.  Client is on a weight loss program and is feeling better about this and is also working on regulating his anger better and concentrating on positives about self and things that he accomplishes and journaling when he needs too. Client is having more difficulty with relationship issues, they are trying to find a marriage counselor they both can relate too.  They continue to look into this.  We discussed asking for what his wife wants.  " "Continuing to be open to her awareness about an issue, don't get caught up in who is right or wrong.  Bring down defenses, communicate about everyday things and continue practicing positive relationship skills.  Client to be open about feelings, thought, needs and wants.  Not to get caught up in giving in to make the peace because that can lead to resentment. Client working on exercise, getting together with friends, eating better and sleeping well.  Concentrating on positive affirmations and strengths.  Client given assignment to try with wife \"going down memory lesia\"  Concentrate on positive aspects of the relationship. Client was walking more, engaging with friends more and engaging in activities, had some anxiety one evening and not certain what creates this.  Will think about possibly journaling what triggers his anxiety in the future.  Concentrate on applying self compassion to core hurts that are triggered, ask spouse what she needs and wants from him, listen more and will not try and get stuck in powerlessness and more inner power to deal with issues.  Continue couple's counseling, think about getting a dog, and apply self compassion to core hurts and ask wife she wants from client.  Client to continue with exercise, positive activities that improve his mood, continued couple's therapy and engaging in positive communication strategies with wife.  Continue positive affirmations and concentrating on strengths daily. Client and wife decided to separate and most likely move towards a divorce.  This has been difficult for both of them but they have been amicable to each other.  We processed and discussed stages of loss, continue to work on positives in his life like weight loss, exercise, getting a dog, concentrating on positives and strengths in life.  Client talked a little about his relationship with his sister and possibly repairing this in the future. Relationship has been strained for a couple of years.  " Client is doing well dealing with the decision to end his marriage.  Client is hoping to rescue a dog, continue with exercise, engage in CBT skills if needed, continue positive self care skills and reach out to support system if needed. Client went to finalize his divorce with his wife.  Mixed feelings about this that we processed in session.  Discussed the stages of loss and where he was at with this. Client go a rescue dog from a shelter and really looking forward to having a pet again.  Concentrated on affirmations and strengths and engaging in positive self care activities such as playing and walking his dog, exercise, continue weight mgmt goal, reach out to friends and schedule activities which creates accountability for client to follow through with things. Client has finalized his divorce.  Is connecting with family and support system.  We processed stages of loss and client is working through the stages.  Client got a dog which is helping him exercise more and the dog has been a positive emotional support to client.  Client has positive activities to look forward to over the summer, has anew roommate moving in the middle of June.  Ex-wife will be moving out then.  Client will continue to remain active, be aware of any depression symptoms that may pop up and continue to concentrate on positive healing activities to help him through the loss. Client has had some up's and down's in regard to how he is feeling about his divorce, some sadness but not to much of it.  Is sorting out all the issues related to ending his marriage.  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  All of these activities are difficult for client to engage in some really positive self care for self.  Uncertain about having a roommate move in so soon and may think about a later date for this to happen.  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  Apply self compassion to core hurts that are triggered, such as feeling disregarded or disrespected, let go of the resentment and work through for self. Current session: Client has a new roommate and that is working out well.  Client is reaching out to friends and engaging in healthy positive distraction activities.  Has connected with ex-wife and that interaction went well.  Continues to have triggers of sadness about the loss and that is healthy for client.  Is crying at times but not too often.  His dog has been a healthy distraction and he is walking more.  Would like to increase his exercise and watch weight better and will continue to work on this.  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       Episode of Care Goals: Achieved / completed to satisfaction - ACTION (Actively working towards change); Intervened by reinforcing change plan / affirming steps taken     Current / Ongoing Stressors and Concerns:              relationship stress, little interest in doing things, anger regulation issues, financial stress      Treatment Objective(s) Addressed in This Session:   use thought-stopping strategy daily to reduce intrusive thoughts  Worked on treatment plan goals in session  Journal thoughts and feelings, strengths and accomplishments each day and what brings him edward and happiness each day,  exercise, weight loss, Processed grief and loss stages, concentrated on continuing positive and healthy activities in life, connect with support system and positive distraction activities. Engage in positive and healthy communication skills, apply self compassion to core hurts.    Intervention:   Thought stopping process, effective communication skills    Journaling, distraction activities, self care " activities  Anger regulation, concentrating on positive affirmations and strengths, apply self compassion to core hurts, exercise, weight loss  Grief and loss of marriage, CBT skills, connect with support system, positive self care activities, self compassion, positive communication and assertiveness skills  ASSESSMENT: Current Emotional / Mental Status (status of significant symptoms):   Risk status (Self / Other harm or suicidal ideation)   Client denies current fears or concerns for personal safety.   Client denies current or recent suicidal ideation or behaviors.   Client denies current or recent homicidal ideation or behaviors.   Client denies current or recent self injurious behavior or ideation.   Client denies other safety concerns.   A safety and risk management plan has not been developed at this time, however client was given the after-hours number / 911 should there be a change in any of these risk factors.     Appearance:   Appropriate    Eye Contact:   Good    Psychomotor Behavior: Normal    Attitude:   Cooperative    Orientation:   All   Speech    Rate / Production: Normal     Volume:  Normal    Mood:    Depressed  Sad    Affect:    Subdued    Thought Content:  Referential Thinking    Thought Form:  Goal Directed    Insight:    Fair      Medication Review:   No changes to current psychiatric medication(s)     Medication Compliance:   Yes     Changes in Health Issues:   None reported     Chemical Use Review:   Substance Use: Chemical use reviewed, no active concerns identified      Tobacco Use: No current tobacco use.       Collateral Reports Completed:   Not Applicable    PLAN: (Client Tasks / Therapist Tasks / Other) Past sessions: Worked on tx plan in session today.  Client will engage in thought stopping process to develop awareness about when he starts to feel anger or irritability. Work on positive communication skills with wife, engage in positive communication sessions with her weekly and use  "\"I\" statements.  Continue to use mindfulness HOANG when client needs to regulate his mood. Scheduled additional counseling sessions. Client will start documenting and journaling feelings and thoughts in a journal and concentrate on strengths and 3 positives or things that he accomplishes each day and what bring him edward.  Client having difficulty in his relationship and continuing to share with his wife how he feels and thinks about issues in his marriage.  Continue to engage in activities that distract him form negative feelings and thoughts. Concentrate on good self care activities that improve his mood.Client is communicating better with his partner, they have scheduled couple's counseling.  Client is on a weight loss program and is feeling better about this and is also working on regulating his anger better and concentrating on positives about self and things that he accomplishes and journaling when he needs too. Client is having more difficulty with relationship issues, they are trying to find a marriage counselor they both can relate too.  They continue to look into this.  We discussed asking for what his wife wants.  Continuing to be open to her awareness about an issue, don't get caught up in who is right or wrong.  Bring down defenses, communicate about everyday things and continue practicing positive relationship skills.  Client to be open about feelings, thought, needs and wants.  Not to get caught up in giving in to make the peace because that can lead to resentment. Client working on exercise, getting together with friends, eating better and sleeping well.  Concentrating on positive affirmations and strengths. Client given assignment to try with wife \"going down memory lesia\"  Concentrate on positive aspects of the relationship.  Client was walking more, engaging with friends more and engaging in activities, had some anxiety one evening and not certain what creates this.  Will think about possibly " journaling what triggers his anxiety in the future.  Concentrate on applying self compassion to core hurts that are triggered, ask spouse what she needs and wants from him, listen more and will not try and get stuck in powerlessness and more inner power to deal with issues.  Continue couple's counseling, think about getting a dog, and apply self compassion to core hurts and ask wife she wants from client.  Client to continue with exercise, positive activities that improve his mood, continued couple's therapy and engaging in positive communication strategies with wife.  Continue positive affirmations and concentrating on strengths daily. Client and wife decided to separate and most likely move towards a divorce.  This has been difficult for both of them but they have been amicable to each other.  We processed and discussed stages of loss, continue to work on positives in his life like weight loss, exercise, getting a dog, concentrating on positives and strengths in life.  Client talked a little about his relationship with his sister and possibly repairing this in the future. Relationship has been strained for a couple of years. Client is doing well dealing with the decision to end his marriage.  Client is hoping to rescue a dog, continue with exercise, engage in CBT skills if needed, continue positive self care skills and reach out to support system if needed. Reviewed stages of grief and loss, client also is engaging in activities with friends that lift his mood. Client went to finalize his divorce with his wife.  Mixed feelings about this that we processed in session.  Discussed the stages of loss and where he was at with this. Client go a rescue dog from a shelter and really looking forward to having a pet again.  Concentrated on affirmations and strengths and engaging in positive self care activities such as playing and walking his dog, exercise, continue weight mgmt goal, reach out to friends and schedule  "activities which creates accountability for client to follow through with things.  Client has finalized his divorce.  Is connecting with family and support system.  We processed stages of loss and client is working through the stages.  Client got a dog which is helping him exercise more and the dog has been a positive emotional support to client.  Client has positive activities to look forward to over the summer, has anew roommate moving in the middle of June.  Ex-wife will be moving out then.  Client will continue to remain active, be aware of any depression symptoms that may pop up and continue to concentrate on positive healing activities to help him through the loss. Client has had some up's and down's in regard to how he is feeling about his divorce, some sadness but not to much of it.  Is sorting out all the issues related to ending his marriage.  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  All of these activities are difficult for client to engage in some really positive self care for self.  Uncertain about having a roommate move in so soon and may think about a later date for this to happen.  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  Apply self compassion to core hurts that are triggered, such as feeling disregarded or disrespected, let go of the resentment and work through for self. Current session: Client has a new roommate and that is working out well.  Client is reaching out to friends and engaging in healthy positive distraction activities.  Has connected with ex-wife and that interaction went well.  Continues to have triggers of sadness about the loss and that is healthy for client.  Is crying at times but not too often.  His dog has been a healthy distraction and he is walking more.  " Would like to increase his exercise and watch weight better and will continue to work on this.  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                            Hermilo Borja, [facility]                                                         ________________________________________________________________________    Treatment Plan    Client's Name: Torres Cuevas  YOB: 1980    Date: 1-16-18    DSM-V Diagnoses:  296.31 (F33.0) Major Depressive Disorder, Recurrent Episode, Mild _ and With anxious distress  Psychosocial & Contextual Factors: relationship stress, little interest in doing things, anger regulation issues, financial stress    WHODAS: Completed first session    Referral / Collaboration:  Referral to another professional/service is not indicated at this time..    Anticipated number of session or this episode of care: 15      MeasurableTreatment Goal(s) related to diagnosis / functional impairment(s)  Goal 1: Client will decrease symptoms of depression and anxiety and return to normal daily functioning    I will know I've met my goal when I feel better about myself and my relationship and have less anger in my life.      Objective #A (Client Action)    Client will use cognitive strategies identified in therapy to challenge anxious thoughts and depressive thoughts.  Status: Continued - Date(s):1-16-18, 7-17-18     Intervention(s)  Therapist will teach thought stopping process and CBT skills to regulate his mood.  Client will practice daily until it becomes automatic to regulate his mood better. .    Objective #B  Client will improve his overall self esteem and mood.  Status: Continued - Date(s): 1-16-18, 7-17-18    Intervention(s)  Therapist will assign homework Complete self esteem schedule and work on areas that need improvement.  Client will concentrate on engaging in activities that improve his  self esteem and write down what he accomplishes each day to improve his self esteem. .    Objective #C  Client will learn effective ways to regulate his anger..  Status: Continued - Date(s): 1-16-18, 7-17-18     Intervention(s)  Therapist will assign homework Client will Learn the HEALS technique to regulate his anger daily or as needed. .        Client has reviewed and agreed to the above plan.      Hermilo Borja, [facility]  January 16, 2018

## 2018-07-18 ASSESSMENT — ANXIETY QUESTIONNAIRES: GAD7 TOTAL SCORE: 1

## 2018-07-18 ASSESSMENT — PATIENT HEALTH QUESTIONNAIRE - PHQ9: SUM OF ALL RESPONSES TO PHQ QUESTIONS 1-9: 4

## 2018-08-20 ENCOUNTER — OFFICE VISIT (OUTPATIENT)
Dept: PSYCHOLOGY | Facility: CLINIC | Age: 38
End: 2018-08-20
Payer: COMMERCIAL

## 2018-08-20 DIAGNOSIS — F33.0 MAJOR DEPRESSIVE DISORDER, RECURRENT EPISODE, MILD WITH ANXIOUS DISTRESS (H): Primary | ICD-10-CM

## 2018-08-20 PROCEDURE — 90834 PSYTX W PT 45 MINUTES: CPT | Performed by: SOCIAL WORKER

## 2018-08-20 NOTE — PROGRESS NOTES
"                                               Progress Note    Client Name: Torres Cuevas  Date: 8-20-18         Service Type: Individual      Session Start Time: 4:30  Session End Time: 5:15      Session Length: 45     Session #: 15     Attendees: Client    Treatment Plan Last Reviewed: Started tx plan today 1-16-18, 7-17-18  PHQ-9 / KANDY-7 : Complete next session.      DATA      Progress Since Last Session (Related to Symptoms / Goals / Homework):   Symptoms: Improving less depression symptoms and anxiety symptoms this session    Homework: Achieved / completed to satisfactionPrevious sessions: Worked on tx plan in session today.  Client will engage in thought stopping process to develop awareness about when he starts to feel anger or irritability. Work on positive communication skills with wife, engage in positive communication sessions with her weekly and use \"I\" statements.  Continue to use mindfulness HOANG when client needs to regulate his mood. Client will start documenting and journaling feelings and thoughts in a journal and concentrate on strengths and 3 positives or things that he accomplishes each day and what bring him edward.  Client having difficulty in his relationship and continuing to share with his wife how he feels and thinks about issues in his marriage.  Continue to engage in activities that distract him form negative feelings and thoughts.Client is communicating better with his partner, they have scheduled couple's counseling.  Client is on a weight loss program and is feeling better about this and is also working on regulating his anger better and concentrating on positives about self and things that he accomplishes and journaling when he needs too. Client is having more difficulty with relationship issues, they are trying to find a marriage counselor they both can relate too.  They continue to look into this.  We discussed asking for what his wife wants.  Continuing to be open to her awareness " "about an issue, don't get caught up in who is right or wrong.  Bring down defenses, communicate about everyday things and continue practicing positive relationship skills.  Client to be open about feelings, thought, needs and wants.  Not to get caught up in giving in to make the peace because that can lead to resentment. Client working on exercise, getting together with friends, eating better and sleeping well.  Concentrating on positive affirmations and strengths.  Client given assignment to try with wife \"going down memory lesia\"  Concentrate on positive aspects of the relationship. Client was walking more, engaging with friends more and engaging in activities, had some anxiety one evening and not certain what creates this.  Will think about possibly journaling what triggers his anxiety in the future.  Concentrate on applying self compassion to core hurts that are triggered, ask spouse what she needs and wants from him, listen more and will not try and get stuck in powerlessness and more inner power to deal with issues.  Continue couple's counseling, think about getting a dog, and apply self compassion to core hurts and ask wife she wants from client.  Client to continue with exercise, positive activities that improve his mood, continued couple's therapy and engaging in positive communication strategies with wife.  Continue positive affirmations and concentrating on strengths daily. Client and wife decided to separate and most likely move towards a divorce.  This has been difficult for both of them but they have been amicable to each other.  We processed and discussed stages of loss, continue to work on positives in his life like weight loss, exercise, getting a dog, concentrating on positives and strengths in life.  Client talked a little about his relationship with his sister and possibly repairing this in the future. Relationship has been strained for a couple of years.  Client is doing well dealing with the " "decision to end his marriage.  Client is hoping to rescue a dog, continue with exercise, engage in CBT skills if needed, continue positive self care skills and reach out to support system if needed. Client went to finalize his divorce with his wife.  Mixed feelings about this that we processed in session.  Discussed the stages of loss and where he was at with this. Client go a rescue dog from a shelter and really looking forward to having a pet again.  Concentrated on affirmations and strengths and engaging in positive self care activities such as playing and walking his dog, exercise, continue weight mgmt goal, reach out to friends and schedule activities which creates accountability for client to follow through with things. Client has finalized his divorce.  Is connecting with family and support system.  We processed stages of loss and client is working through the stages.  Client got a dog which is helping him exercise more and the dog has been a positive emotional support to client.  Client has positive activities to look forward to over the summer, has anew roommate moving in the middle of June.  Ex-wife will be moving out then.  Client will continue to remain active, be aware of any depression symptoms that may pop up and continue to concentrate on positive healing activities to help him through the loss. Client has had some up's and down's in regard to how he is feeling about his divorce, some sadness but not to much of it.  Is sorting out all the issues related to ending his marriage.  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  All of these activities are difficult for client to engage in some really positive self care for self.  Uncertain about having a roommate move in so soon and may think about a later date for this to happen.  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  Apply self compassion to core hurts that are triggered, such as feeling disregarded or disrespected, let go of the resentment and work through for self.  Client has a new roommate and that is working out well.  Client is reaching out to friends and engaging in healthy positive distraction activities.  Has connected with ex-wife and that interaction went well.  Continues to have triggers of sadness about the loss and that is healthy for client.  Is crying at times but not too often.  His dog has been a healthy distraction and he is walking more.  Would like to increase his exercise and watch weight better and will continue to work on this.  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 Current session: Client is doing well and having less sadness and engaging with support system, activities that he enjoys and starting to date again.  Client is procrastinating more and having some difficulty with sleep.  He was given a sleep hygiene tip sleep to try a few of these things to improve his sleep and start tackling the list of things that he has procrastinated.  He will get a chalk board which has helped him get to the things he wants to get done and connect to the feeling he get's when accomplishing and following through with the list. Work on increasing his exercise.       Episode of Care Goals: Achieved / completed to satisfaction - ACTION (Actively working towards change); Intervened by reinforcing change plan / affirming steps taken     Current / Ongoing Stressors and Concerns:              relationship stress, little interest in doing things, anger regulation issues, financial stress      Treatment Objective(s) Addressed in This Session:   use thought-stopping strategy daily to reduce intrusive thoughts  Worked on treatment plan goals in  session  Journal thoughts and feelings, strengths and accomplishments each day and what brings him edward and happiness each day,  exercise, weight loss, Processed grief and loss stages, concentrated on continuing positive and healthy activities in life, connect with support system and positive distraction activities. Engage in positive and healthy communication skills, apply self compassion to core hurts. Decrease procrastination and improve sleep hygiene   Intervention:   Thought stopping process, effective communication skills    Journaling, distraction activities, self care activities  Anger regulation, concentrating on positive affirmations and strengths, apply self compassion to core hurts, exercise, weight loss  Grief and loss of marriage, CBT skills, connect with support system, positive self care activities, self compassion, positive communication and assertiveness skills  ASSESSMENT: Current Emotional / Mental Status (status of significant symptoms):   Risk status (Self / Other harm or suicidal ideation)   Client denies current fears or concerns for personal safety.   Client denies current or recent suicidal ideation or behaviors.   Client denies current or recent homicidal ideation or behaviors.   Client denies current or recent self injurious behavior or ideation.   Client denies other safety concerns.   A safety and risk management plan has not been developed at this time, however client was given the after-hours number / 911 should there be a change in any of these risk factors.     Appearance:   Appropriate    Eye Contact:   Good    Psychomotor Behavior: Normal    Attitude:   Cooperative    Orientation:   All   Speech    Rate / Production: Normal     Volume:  Normal    Mood:    Depressed    Affect:    Subdued    Thought Content:  Referential Thinking    Thought Form:  Goal Directed    Insight:    Fair      Medication Review:   No changes to current psychiatric medication(s)     Medication  "Compliance:   Yes     Changes in Health Issues:   None reported     Chemical Use Review:   Substance Use: Chemical use reviewed, no active concerns identified      Tobacco Use: No current tobacco use.       Collateral Reports Completed:   Not Applicable    PLAN: (Client Tasks / Therapist Tasks / Other) Past sessions: Worked on tx plan in session today.  Client will engage in thought stopping process to develop awareness about when he starts to feel anger or irritability. Work on positive communication skills with wife, engage in positive communication sessions with her weekly and use \"I\" statements.  Continue to use mindfulness HOANG when client needs to regulate his mood. Scheduled additional counseling sessions. Client will start documenting and journaling feelings and thoughts in a journal and concentrate on strengths and 3 positives or things that he accomplishes each day and what bring him edward.  Client having difficulty in his relationship and continuing to share with his wife how he feels and thinks about issues in his marriage.  Continue to engage in activities that distract him form negative feelings and thoughts. Concentrate on good self care activities that improve his mood.Client is communicating better with his partner, they have scheduled couple's counseling.  Client is on a weight loss program and is feeling better about this and is also working on regulating his anger better and concentrating on positives about self and things that he accomplishes and journaling when he needs too. Client is having more difficulty with relationship issues, they are trying to find a marriage counselor they both can relate too.  They continue to look into this.  We discussed asking for what his wife wants.  Continuing to be open to her awareness about an issue, don't get caught up in who is right or wrong.  Bring down defenses, communicate about everyday things and continue practicing positive relationship skills.  Client " "to be open about feelings, thought, needs and wants.  Not to get caught up in giving in to make the peace because that can lead to resentment. Client working on exercise, getting together with friends, eating better and sleeping well.  Concentrating on positive affirmations and strengths. Client given assignment to try with wife \"going down memory lesia\"  Concentrate on positive aspects of the relationship.  Client was walking more, engaging with friends more and engaging in activities, had some anxiety one evening and not certain what creates this.  Will think about possibly journaling what triggers his anxiety in the future.  Concentrate on applying self compassion to core hurts that are triggered, ask spouse what she needs and wants from him, listen more and will not try and get stuck in powerlessness and more inner power to deal with issues.  Continue couple's counseling, think about getting a dog, and apply self compassion to core hurts and ask wife she wants from client.  Client to continue with exercise, positive activities that improve his mood, continued couple's therapy and engaging in positive communication strategies with wife.  Continue positive affirmations and concentrating on strengths daily. Client and wife decided to separate and most likely move towards a divorce.  This has been difficult for both of them but they have been amicable to each other.  We processed and discussed stages of loss, continue to work on positives in his life like weight loss, exercise, getting a dog, concentrating on positives and strengths in life.  Client talked a little about his relationship with his sister and possibly repairing this in the future. Relationship has been strained for a couple of years. Client is doing well dealing with the decision to end his marriage.  Client is hoping to rescue a dog, continue with exercise, engage in CBT skills if needed, continue positive self care skills and reach out to support " "system if needed. Reviewed stages of grief and loss, client also is engaging in activities with friends that lift his mood. Client went to finalize his divorce with his wife.  Mixed feelings about this that we processed in session.  Discussed the stages of loss and where he was at with this. Client go a rescue dog from a shelter and really looking forward to having a pet again.  Concentrated on affirmations and strengths and engaging in positive self care activities such as playing and walking his dog, exercise, continue weight mgmt goal, reach out to friends and schedule activities which creates accountability for client to follow through with things.  Client has finalized his divorce.  Is connecting with family and support system.  We processed stages of loss and client is working through the stages.  Client got a dog which is helping him exercise more and the dog has been a positive emotional support to client.  Client has positive activities to look forward to over the summer, has anew roommate moving in the middle of June.  Ex-wife will be moving out then.  Client will continue to remain active, be aware of any depression symptoms that may pop up and continue to concentrate on positive healing activities to help him through the loss. Client has had some up's and down's in regard to how he is feeling about his divorce, some sadness but not to much of it.  Is sorting out all the issues related to ending his marriage.  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  All of these activities are difficult for client to engage in some really positive self care for self.  Uncertain about having a roommate move in so soon and may think about a later date for this to happen.  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  Apply self compassion to core hurts that are triggered, such as feeling disregarded or disrespected, let go of the resentment and work through for self.  Client has a new roommate and that is working out well.  Client is reaching out to friends and engaging in healthy positive distraction activities.  Has connected with ex-wife and that interaction went well.  Continues to have triggers of sadness about the loss and that is healthy for client.  Is crying at times but not too often.  His dog has been a healthy distraction and he is walking more.  Would like to increase his exercise and watch weight better and will continue to work on this.  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  Client is procrastinating more and having some difficulty with sleep.  He was given a sleep hygiene tip sleep to try a few of these things to improve his sleep and start tackling the list of things that he has procrastinated.  He will get a chalk board which has helped him get to the things he wants to get done and connect to the feeling he get's when accomplishing and following through with the list. Work on increasing his exercise.                               Hermilo Borja, TORY                                                         ________________________________________________________________________    Treatment Plan    Client's Name: Torres Cuevas  YOB: 1980    Date: 1-16-18    DSM-V Diagnoses:  296.31 (F33.0) Major Depressive Disorder, Recurrent Episode, Mild _ and With anxious distress  Psychosocial & Contextual Factors: relationship stress, little interest in doing things, anger regulation issues, financial stress    WHODAS: Completed first  session    Referral / Collaboration:  Referral to another professional/service is not indicated at this time..    Anticipated number of session or this episode of care: 15      MeasurableTreatment Goal(s) related to diagnosis / functional impairment(s)  Goal 1: Client will decrease symptoms of depression and anxiety and return to normal daily functioning    I will know I've met my goal when I feel better about myself and my relationship and have less anger in my life.      Objective #A (Client Action)    Client will use cognitive strategies identified in therapy to challenge anxious thoughts and depressive thoughts.  Status: Continued - Date(s):1-16-18, 7-17-18     Intervention(s)  Therapist will teach thought stopping process and CBT skills to regulate his mood.  Client will practice daily until it becomes automatic to regulate his mood better. .    Objective #B  Client will improve his overall self esteem and mood.  Status: Continued - Date(s): 1-16-18, 7-17-18    Intervention(s)  Therapist will assign homework Complete self esteem schedule and work on areas that need improvement.  Client will concentrate on engaging in activities that improve his self esteem and write down what he accomplishes each day to improve his self esteem. .    Objective #C  Client will learn effective ways to regulate his anger..  Status: Continued - Date(s): 1-16-18, 7-17-18     Intervention(s)  Therapist will assign homework Client will Learn the HEALS technique to regulate his anger daily or as needed. .        Client has reviewed and agreed to the above plan.      Hermilo Borja, [facility]  January 16, 2018

## 2018-08-20 NOTE — MR AVS SNAPSHOT
MRN:[id number]                      After Visit Summary   8/20/2018    Torres Cuevas    MRN: [id number]           Visit Information        Provider Department      8/20/2018 4:30 PM Hermilo Borja LICSW [facility] Generic      Your next 10 appointments already scheduled     Sep 17, 2018  4:30 PM CDT   Return Visit with TORY Canchola   [facility] ([facility])    [address] [place] [id number]   [phone]              Care EveryWhere ID     This is your Care EveryWhere ID. This could be used by other organizations to access your [facility] medical records  [id number]        Equal Access to Services     RAMESH MANLEY : Ludwig Andrew, rosalia javier, juan jose cole, olga corea. [facility] [phone].    ATENCIÓN: Si habla español, tiene a camacho disposición servicios gratuitos de asistencia lingüística. Llame al [phone].    We comply with applicable federal civil rights laws and [place] laws. We do not discriminate on the basis of race, color, national origin, age, disability, sex, sexual orientation, or gender identity.

## 2018-09-17 ENCOUNTER — OFFICE VISIT (OUTPATIENT)
Dept: PSYCHOLOGY | Facility: CLINIC | Age: 38
End: 2018-09-17
Payer: COMMERCIAL

## 2018-09-17 DIAGNOSIS — F33.0 MAJOR DEPRESSIVE DISORDER, RECURRENT EPISODE, MILD WITH ANXIOUS DISTRESS (H): Primary | ICD-10-CM

## 2018-09-17 PROCEDURE — 90834 PSYTX W PT 45 MINUTES: CPT | Performed by: SOCIAL WORKER

## 2018-09-17 ASSESSMENT — ANXIETY QUESTIONNAIRES
3. WORRYING TOO MUCH ABOUT DIFFERENT THINGS: NOT AT ALL
GAD7 TOTAL SCORE: 0
IF YOU CHECKED OFF ANY PROBLEMS ON THIS QUESTIONNAIRE, HOW DIFFICULT HAVE THESE PROBLEMS MADE IT FOR YOU TO DO YOUR WORK, TAKE CARE OF THINGS AT HOME, OR GET ALONG WITH OTHER PEOPLE: NOT DIFFICULT AT ALL
1. FEELING NERVOUS, ANXIOUS, OR ON EDGE: NOT AT ALL
7. FEELING AFRAID AS IF SOMETHING AWFUL MIGHT HAPPEN: NOT AT ALL
6. BECOMING EASILY ANNOYED OR IRRITABLE: NOT AT ALL
5. BEING SO RESTLESS THAT IT IS HARD TO SIT STILL: NOT AT ALL
2. NOT BEING ABLE TO STOP OR CONTROL WORRYING: NOT AT ALL

## 2018-09-17 ASSESSMENT — PATIENT HEALTH QUESTIONNAIRE - PHQ9: 5. POOR APPETITE OR OVEREATING: NOT AT ALL

## 2018-09-17 NOTE — PROGRESS NOTES
"                                               Progress Note    Client Name: Torres Cuevas  Date: 9-17-18         Service Type: Individual      Session Start Time: 4:30  Session End Time: 5:15      Session Length: 45     Session #: 16     Attendees: Client    Treatment Plan Last Reviewed: Started tx plan today 1-16-18, 7-17-18  PHQ-9 / KANDY-7 : Completed this session; Improved scores in both screenings      DATA      Progress Since Last Session (Related to Symptoms / Goals / Homework):   Symptoms: Improving less depression symptoms and anxiety symptoms this session    Homework: Achieved / completed to satisfactionPrevious sessions: Worked on tx plan in session today.  Client will engage in thought stopping process to develop awareness about when he starts to feel anger or irritability. Work on positive communication skills with wife, engage in positive communication sessions with her weekly and use \"I\" statements.  Continue to use mindfulness HOANG when client needs to regulate his mood. Client will start documenting and journaling feelings and thoughts in a journal and concentrate on strengths and 3 positives or things that he accomplishes each day and what bring him edward.  Client having difficulty in his relationship and continuing to share with his wife how he feels and thinks about issues in his marriage.  Continue to engage in activities that distract him form negative feelings and thoughts.Client is communicating better with his partner, they have scheduled couple's counseling.  Client is on a weight loss program and is feeling better about this and is also working on regulating his anger better and concentrating on positives about self and things that he accomplishes and journaling when he needs too. Client is having more difficulty with relationship issues, they are trying to find a marriage counselor they both can relate too.  They continue to look into this.  We discussed asking for what his wife wants.  " "Continuing to be open to her awareness about an issue, don't get caught up in who is right or wrong.  Bring down defenses, communicate about everyday things and continue practicing positive relationship skills.  Client to be open about feelings, thought, needs and wants.  Not to get caught up in giving in to make the peace because that can lead to resentment. Client working on exercise, getting together with friends, eating better and sleeping well.  Concentrating on positive affirmations and strengths.  Client given assignment to try with wife \"going down memory lesia\"  Concentrate on positive aspects of the relationship. Client was walking more, engaging with friends more and engaging in activities, had some anxiety one evening and not certain what creates this.  Will think about possibly journaling what triggers his anxiety in the future.  Concentrate on applying self compassion to core hurts that are triggered, ask spouse what she needs and wants from him, listen more and will not try and get stuck in powerlessness and more inner power to deal with issues.  Continue couple's counseling, think about getting a dog, and apply self compassion to core hurts and ask wife she wants from client.  Client to continue with exercise, positive activities that improve his mood, continued couple's therapy and engaging in positive communication strategies with wife.  Continue positive affirmations and concentrating on strengths daily. Client and wife decided to separate and most likely move towards a divorce.  This has been difficult for both of them but they have been amicable to each other.  We processed and discussed stages of loss, continue to work on positives in his life like weight loss, exercise, getting a dog, concentrating on positives and strengths in life.  Client talked a little about his relationship with his sister and possibly repairing this in the future. Relationship has been strained for a couple of years.  " Client is doing well dealing with the decision to end his marriage.  Client is hoping to rescue a dog, continue with exercise, engage in CBT skills if needed, continue positive self care skills and reach out to support system if needed. Client went to finalize his divorce with his wife.  Mixed feelings about this that we processed in session.  Discussed the stages of loss and where he was at with this. Client go a rescue dog from a shelter and really looking forward to having a pet again.  Concentrated on affirmations and strengths and engaging in positive self care activities such as playing and walking his dog, exercise, continue weight mgmt goal, reach out to friends and schedule activities which creates accountability for client to follow through with things. Client has finalized his divorce.  Is connecting with family and support system.  We processed stages of loss and client is working through the stages.  Client got a dog which is helping him exercise more and the dog has been a positive emotional support to client.  Client has positive activities to look forward to over the summer, has anew roommate moving in the middle of June.  Ex-wife will be moving out then.  Client will continue to remain active, be aware of any depression symptoms that may pop up and continue to concentrate on positive healing activities to help him through the loss. Client has had some up's and down's in regard to how he is feeling about his divorce, some sadness but not to much of it.  Is sorting out all the issues related to ending his marriage.  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  All of these activities are difficult for client to engage in some really positive self care for self.  Uncertain about having a roommate move in so soon and may think about a later date for this to happen.  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  Apply self compassion to core hurts that are triggered, such as feeling disregarded or disrespected, let go of the resentment and work through for self.  Client has a new roommate and that is working out well.  Client is reaching out to friends and engaging in healthy positive distraction activities.  Has connected with ex-wife and that interaction went well.  Continues to have triggers of sadness about the loss and that is healthy for client.  Is crying at times but not too often.  His dog has been a healthy distraction and he is walking more.  Would like to increase his exercise and watch weight better and will continue to work on this.  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  Client is doing well and having less sadness and engaging with support system, activities that he enjoys and starting to date again.  Client is procrastinating more and having some difficulty with sleep.  He was given a sleep hygiene tip sleep to try a few of these things to improve his sleep and start tackling the list of things that he has procrastinated.  He will get a chalk board which has helped him get to the things he wants to get done and connect to the feeling he get's when accomplishing and following through with the list. Work on increasing his exercise. Current session: Client is dating and is excited about a new woman in his life.  He is feeling really good about the relationship and we processed any feelings he was having due to it being so quickly after his divorce.  We processed feelings and client has started to journal more his thoughts and feelings.  Mood has improved and he is feeling good about where his life is at currently.  Winter is coming up and at times his depression increases during this season.  " Client is wanting to continue counseling until through the winter and as he works through the dynamics of this new relationship.  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       Episode of Care Goals: Achieved / completed to satisfaction - ACTION (Actively working towards change); Intervened by reinforcing change plan / affirming steps taken     Current / Ongoing Stressors and Concerns:              relationship stress, little interest in doing things, anger regulation issues, financial stress      Treatment Objective(s) Addressed in This Session:   use thought-stopping strategy daily to reduce intrusive thoughts  Worked on treatment plan goals in session  Journal thoughts and feelings, strengths and accomplishments each day and what brings him edward and happiness each day,  exercise, weight loss, Processed grief and loss stages, concentrated on continuing positive and healthy activities in life, connect with support system and positive distraction activities. Engage in positive and healthy communication skills, apply self compassion to core hurts. Decrease procrastination and improve sleep hygiene   Intervention:   Thought stopping process, effective communication skills    Journaling, distraction activities, self care activities  Anger regulation, concentrating on positive affirmations and strengths, apply self compassion to core hurts, exercise, weight loss  Grief and loss of marriage, CBT skills, connect with support system, positive self care activities, self compassion, positive communication and assertiveness skills  ASSESSMENT: Current Emotional / Mental Status (status of significant symptoms):   Risk status (Self / Other harm or suicidal ideation)   Client denies current fears or concerns for personal safety.   Client denies current or recent suicidal ideation or behaviors.   Client denies current or recent homicidal ideation or behaviors.   Client denies current or recent self  "injurious behavior or ideation.   Client denies other safety concerns.   A safety and risk management plan has not been developed at this time, however client was given the after-hours number / 911 should there be a change in any of these risk factors.     Appearance:   Appropriate    Eye Contact:   Good    Psychomotor Behavior: Normal    Attitude:   Cooperative    Orientation:   All   Speech    Rate / Production: Normal     Volume:  Normal    Mood:    Normal   Affect:    Subdued    Thought Content:  Referential Thinking    Thought Form:  Goal Directed    Insight:    Fair      Medication Review:   No changes to current psychiatric medication(s)     Medication Compliance:   Yes     Changes in Health Issues:   None reported     Chemical Use Review:   Substance Use: Chemical use reviewed, no active concerns identified      Tobacco Use: No current tobacco use.       Collateral Reports Completed:   Not Applicable    PLAN: (Client Tasks / Therapist Tasks / Other) Past sessions: Worked on tx plan in session today.  Client will engage in thought stopping process to develop awareness about when he starts to feel anger or irritability. Work on positive communication skills with wife, engage in positive communication sessions with her weekly and use \"I\" statements.  Continue to use mindfulness HOANG when client needs to regulate his mood. Scheduled additional counseling sessions. Client will start documenting and journaling feelings and thoughts in a journal and concentrate on strengths and 3 positives or things that he accomplishes each day and what bring him edward.  Client having difficulty in his relationship and continuing to share with his wife how he feels and thinks about issues in his marriage.  Continue to engage in activities that distract him form negative feelings and thoughts. Concentrate on good self care activities that improve his mood.Client is communicating better with his partner, they have scheduled couple's " "counseling.  Client is on a weight loss program and is feeling better about this and is also working on regulating his anger better and concentrating on positives about self and things that he accomplishes and journaling when he needs too. Client is having more difficulty with relationship issues, they are trying to find a marriage counselor they both can relate too.  They continue to look into this.  We discussed asking for what his wife wants.  Continuing to be open to her awareness about an issue, don't get caught up in who is right or wrong.  Bring down defenses, communicate about everyday things and continue practicing positive relationship skills.  Client to be open about feelings, thought, needs and wants.  Not to get caught up in giving in to make the peace because that can lead to resentment. Client working on exercise, getting together with friends, eating better and sleeping well.  Concentrating on positive affirmations and strengths. Client given assignment to try with wife \"going down memory lesia\"  Concentrate on positive aspects of the relationship.  Client was walking more, engaging with friends more and engaging in activities, had some anxiety one evening and not certain what creates this.  Will think about possibly journaling what triggers his anxiety in the future.  Concentrate on applying self compassion to core hurts that are triggered, ask spouse what she needs and wants from him, listen more and will not try and get stuck in powerlessness and more inner power to deal with issues.  Continue couple's counseling, think about getting a dog, and apply self compassion to core hurts and ask wife she wants from client.  Client to continue with exercise, positive activities that improve his mood, continued couple's therapy and engaging in positive communication strategies with wife.  Continue positive affirmations and concentrating on strengths daily. Client and wife decided to separate and most likely " move towards a divorce.  This has been difficult for both of them but they have been amicable to each other.  We processed and discussed stages of loss, continue to work on positives in his life like weight loss, exercise, getting a dog, concentrating on positives and strengths in life.  Client talked a little about his relationship with his sister and possibly repairing this in the future. Relationship has been strained for a couple of years. Client is doing well dealing with the decision to end his marriage.  Client is hoping to rescue a dog, continue with exercise, engage in CBT skills if needed, continue positive self care skills and reach out to support system if needed. Reviewed stages of grief and loss, client also is engaging in activities with friends that lift his mood. Client went to finalize his divorce with his wife.  Mixed feelings about this that we processed in session.  Discussed the stages of loss and where he was at with this. Client go a rescue dog from a shelter and really looking forward to having a pet again.  Concentrated on affirmations and strengths and engaging in positive self care activities such as playing and walking his dog, exercise, continue weight mgmt goal, reach out to friends and schedule activities which creates accountability for client to follow through with things.  Client has finalized his divorce.  Is connecting with family and support system.  We processed stages of loss and client is working through the stages.  Client got a dog which is helping him exercise more and the dog has been a positive emotional support to client.  Client has positive activities to look forward to over the summer, has anew roommate moving in the middle of June.  Ex-wife will be moving out then.  Client will continue to remain active, be aware of any depression symptoms that may pop up and continue to concentrate on positive healing activities to help him through the loss. Client has had some  "up's and down's in regard to how he is feeling about his divorce, some sadness but not to much of it.  Is sorting out all the issues related to ending his marriage.  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  All of these activities are difficult for client to engage in some really positive self care for self.  Uncertain about having a roommate move in so soon and may think about a later date for this to happen.  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  Apply self compassion to core hurts that are triggered, such as feeling disregarded or disrespected, let go of the resentment and work through for self.  Client has a new roommate and that is working out well.  Client is reaching out to friends and engaging in healthy positive distraction activities.  Has connected with ex-wife and that interaction went well.  Continues to have triggers of sadness about the loss and that is healthy for client.  Is crying at times but not too often.  His dog has been a healthy distraction and he is walking more.  Would like to increase his exercise and watch weight better and will continue to work on this.  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 Client is doing well and having less sadness and engaging with support system, activities that he enjoys and starting to date again.  Client is procrastinating more and having some difficulty with sleep.  He was given a sleep hygiene tip sleep to try a few of these things to improve his sleep and start tackling the list of things that he has procrastinated.  He will get a chalk board which has helped him get to the things he wants to get done and connect to the feeling he get's " when accomplishing and following through with the list. Work on increasing his exercise.Current session: Client is dating and is excited about a new woman in his life.  He is feeling really good about the relationship and we processed any feelings he was having due to it being so quickly after his divorce.  We processed feelings and client has started to journal more his thoughts and feelings.  Mood has improved and he is feeling good about where his life is at currently.  Winter is coming up and at times his depression increases during this season.  Client is wanting to continue counseling until through the winter and as he works through the dynamics of this new relationship.  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                                Hermilo Borja, [facility]                                                         ________________________________________________________________________    Treatment Plan    Client's Name: Torres Cuevas  YOB: 1980    Date: 1-16-18    DSM-V Diagnoses:  296.31 (F33.0) Major Depressive Disorder, Recurrent Episode, Mild _ and With anxious distress  Psychosocial & Contextual Factors: relationship stress, little interest in doing things, anger regulation issues, financial stress    WHODAS: Completed first session    Referral / Collaboration:  Referral to another professional/service is not indicated at this time..    Anticipated number of session or this episode of care: 15      MeasurableTreatment Goal(s) related to diagnosis / functional impairment(s)  Goal 1: Client will decrease symptoms of depression and anxiety and return to normal daily functioning    I will know I've met my goal when I feel better about myself and my relationship and have less anger in my life.      Objective #A (Client Action)    Client will use cognitive strategies identified in therapy to challenge anxious thoughts and depressive thoughts.  Status: Continued -  Date(s):1-16-18, 7-17-18     Intervention(s)  Therapist will teach thought stopping process and CBT skills to regulate his mood.  Client will practice daily until it becomes automatic to regulate his mood better. .    Objective #B  Client will improve his overall self esteem and mood.  Status: Continued - Date(s): 1-16-18, 7-17-18    Intervention(s)  Therapist will assign homework Complete self esteem schedule and work on areas that need improvement.  Client will concentrate on engaging in activities that improve his self esteem and write down what he accomplishes each day to improve his self esteem. .    Objective #C  Client will learn effective ways to regulate his anger..  Status: Continued - Date(s): 1-16-18, 7-17-18     Intervention(s)  Therapist will assign homework Client will Learn the HEALS technique to regulate his anger daily or as needed. .        Client has reviewed and agreed to the above plan.      Hermilo Borja, [facility]  January 16, 2018

## 2018-09-17 NOTE — MR AVS SNAPSHOT
"                  MRN:[id number]                      After Visit Summary   2018    Torres Cuevas    MRN: [id number]           Visit Information        Provider Department      2018 4:30 PM Hermilo Borja LONNIETORY [facility] Generic      Your next 10 appointments already scheduled     Oct 15, 2018  4:30 PM CDT   Return Visit with Hermilo Borja TORY   [facility] ([facility])    [address] [place] [id number] [phone]              MyChart Information     [employer] lets you send messages to your doctor, view your test results, renew your prescriptions, schedule appointments and more. To sign up, go to [email or web address] . Click on \"Log in\" on the left side of the screen, which will take you to the Welcome page. Then click on \"Sign up Now\" on the right side of the page.     You will be asked to enter the access code listed below, as well as some personal information. Please follow the directions to create your username and password.     Your access code is: [email or web address]  Expires: 2018  5:46 PM     Your access code will  in 90 days. If you need help or a new code, please call your [place] clinic or [phone].        Care EveryWhere ID     This is your Care EveryWhere ID. This could be used by other organizations to access your [place] medical records  [id number]        Equal Access to Services     RAMESH MANLEY AH: Hadii jeison zavalao Sojennieali, waaxda luqadaha, qaybta kaalmada adeegyada, olga corea. So [facility] [phone].    ATENCIÓN: Si habla español, tiene a camacho disposición servicios gratuitos de asistencia lingüística. Llame al [phone].    We comply with applicable federal civil rights laws and [place] laws. We do not discriminate on the basis of race, color, national origin, age, disability, sex, sexual orientation, or gender identity.       "

## 2018-09-18 ASSESSMENT — ANXIETY QUESTIONNAIRES: GAD7 TOTAL SCORE: 0

## 2018-09-18 ASSESSMENT — PATIENT HEALTH QUESTIONNAIRE - PHQ9: SUM OF ALL RESPONSES TO PHQ QUESTIONS 1-9: 0

## 2018-10-15 ENCOUNTER — OFFICE VISIT (OUTPATIENT)
Dept: PSYCHOLOGY | Facility: CLINIC | Age: 38
End: 2018-10-15
Payer: COMMERCIAL

## 2018-10-15 DIAGNOSIS — F33.0 MAJOR DEPRESSIVE DISORDER, RECURRENT EPISODE, MILD WITH ANXIOUS DISTRESS (H): Primary | ICD-10-CM

## 2018-10-15 PROCEDURE — 90834 PSYTX W PT 45 MINUTES: CPT | Performed by: SOCIAL WORKER

## 2018-10-15 NOTE — PROGRESS NOTES
"                                               Progress Note    Client Name: Torres Cuevas  Date: 10-15-18         Service Type: Individual      Session Start Time: 4:30  Session End Time: 5:15      Session Length: 45     Session #: 17     Attendees: Client    Treatment Plan Last Reviewed: Started tx plan today 1-16-18, 7-17-18, 10-15-18  PHQ-9 / KANDY-7 : Complete next session;       DATA      Progress Since Last Session (Related to Symptoms / Goals / Homework):   Symptoms: Improving less depression symptoms and anxiety symptoms this session    Homework: Achieved / completed to satisfactionPrevious sessions: Worked on tx plan in session today.  Client will engage in thought stopping process to develop awareness about when he starts to feel anger or irritability. Work on positive communication skills with wife, engage in positive communication sessions with her weekly and use \"I\" statements.  Continue to use mindfulness HOANG when client needs to regulate his mood. Client will start documenting and journaling feelings and thoughts in a journal and concentrate on strengths and 3 positives or things that he accomplishes each day and what bring him edward.  Client having difficulty in his relationship and continuing to share with his wife how he feels and thinks about issues in his marriage.  Continue to engage in activities that distract him form negative feelings and thoughts.Client is communicating better with his partner, they have scheduled couple's counseling.  Client is on a weight loss program and is feeling better about this and is also working on regulating his anger better and concentrating on positives about self and things that he accomplishes and journaling when he needs too. Client is having more difficulty with relationship issues, they are trying to find a marriage counselor they both can relate too.  They continue to look into this.  We discussed asking for what his wife wants.  Continuing to be open to her " "awareness about an issue, don't get caught up in who is right or wrong.  Bring down defenses, communicate about everyday things and continue practicing positive relationship skills.  Client to be open about feelings, thought, needs and wants.  Not to get caught up in giving in to make the peace because that can lead to resentment. Client working on exercise, getting together with friends, eating better and sleeping well.  Concentrating on positive affirmations and strengths.  Client given assignment to try with wife \"going down memory lesia\"  Concentrate on positive aspects of the relationship. Client was walking more, engaging with friends more and engaging in activities, had some anxiety one evening and not certain what creates this.  Will think about possibly journaling what triggers his anxiety in the future.  Concentrate on applying self compassion to core hurts that are triggered, ask spouse what she needs and wants from him, listen more and will not try and get stuck in powerlessness and more inner power to deal with issues.  Continue couple's counseling, think about getting a dog, and apply self compassion to core hurts and ask wife she wants from client.  Client to continue with exercise, positive activities that improve his mood, continued couple's therapy and engaging in positive communication strategies with wife.  Continue positive affirmations and concentrating on strengths daily. Client and wife decided to separate and most likely move towards a divorce.  This has been difficult for both of them but they have been amicable to each other.  We processed and discussed stages of loss, continue to work on positives in his life like weight loss, exercise, getting a dog, concentrating on positives and strengths in life.  Client talked a little about his relationship with his sister and possibly repairing this in the future. Relationship has been strained for a couple of years.  Client is doing well dealing " with the decision to end his marriage.  Client is hoping to rescue a dog, continue with exercise, engage in CBT skills if needed, continue positive self care skills and reach out to support system if needed. Client went to finalize his divorce with his wife.  Mixed feelings about this that we processed in session.  Discussed the stages of loss and where he was at with this. Client go a rescue dog from a shelter and really looking forward to having a pet again.  Concentrated on affirmations and strengths and engaging in positive self care activities such as playing and walking his dog, exercise, continue weight mgmt goal, reach out to friends and schedule activities which creates accountability for client to follow through with things. Client has finalized his divorce.  Is connecting with family and support system.  We processed stages of loss and client is working through the stages.  Client got a dog which is helping him exercise more and the dog has been a positive emotional support to client.  Client has positive activities to look forward to over the summer, has anew roommate moving in the middle of June.  Ex-wife will be moving out then.  Client will continue to remain active, be aware of any depression symptoms that may pop up and continue to concentrate on positive healing activities to help him through the loss. Client has had some up's and down's in regard to how he is feeling about his divorce, some sadness but not to much of it.  Is sorting out all the issues related to ending his marriage.  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  All of these activities are difficult for client to engage in some really positive self care for self.  Uncertain about having a roommate move in so soon and may think about a later date for this to happen.  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  Apply self compassion to core hurts that are triggered, such as feeling disregarded or disrespected, let go of the resentment and work through for self.  Client has a new roommate and that is working out well.  Client is reaching out to friends and engaging in healthy positive distraction activities.  Has connected with ex-wife and that interaction went well.  Continues to have triggers of sadness about the loss and that is healthy for client.  Is crying at times but not too often.  His dog has been a healthy distraction and he is walking more.  Would like to increase his exercise and watch weight better and will continue to work on this.  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  Client is doing well and having less sadness and engaging with support system, activities that he enjoys and starting to date again.  Client is procrastinating more and having some difficulty with sleep.  He was given a sleep hygiene tip sleep to try a few of these things to improve his sleep and start tackling the list of things that he has procrastinated.  He will get a chalk board which has helped him get to the things he wants to get done and connect to the feeling he get's when accomplishing and following through with the list. Work on increasing his exercise.  Client is dating and is excited about a new woman in his life.  He is feeling really good about the relationship and we processed any feelings he was having due to it being so quickly after his divorce.  We processed feelings and client has started to journal more his thoughts and feelings.  Mood has improved and he is feeling good about where his life is at currently.  Winter is coming up and at times his depression increases during this season.  Client is wanting to continue counseling until " through the winter and as he works through the dynamics of this new relationship.  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 Current session: Client is very happy with his new relationship and this has improved his mood.  He feels good about this and is in the romantic stage of the relationship.  We processed how this has impacted the work he has done in regard to the loss of his past relationship and he stated that he dos not think it has impacted him much. Client is engaging in healthy activities, looking forward to his [employer] position that will be coming up.  Reinforced the positive steps he has taken to feel better about self and effectively dealing with the loss of his relationship to date.       Episode of Care Goals: Achieved / completed to satisfaction - ACTION (Actively working towards change); Intervened by reinforcing change plan / affirming steps taken     Current / Ongoing Stressors and Concerns:              relationship stress, little interest in doing things, anger regulation issues, financial stress      Treatment Objective(s) Addressed in This Session:   use thought-stopping strategy daily to reduce intrusive thoughts  Worked on treatment plan goals in session  Journal thoughts and feelings, strengths and accomplishments each day and what brings him edward and happiness each day,  exercise, weight loss, Processed grief and loss stages, concentrated on continuing positive and healthy activities in life, connect with support system and positive distraction activities. Engage in positive and healthy communication skills, apply self compassion to core hurts. Decrease procrastination and improve sleep hygiene   Intervention:   Thought stopping process, effective communication skills    Journaling, distraction activities, self care activities  Anger regulation, concentrating on positive affirmations and strengths, apply self compassion to core hurts, exercise, weight loss  Grief and  "loss of marriage, CBT skills, connect with support system, positive self care activities, self compassion, positive communication and assertiveness skills  ASSESSMENT: Current Emotional / Mental Status (status of significant symptoms):   Risk status (Self / Other harm or suicidal ideation)   Client denies current fears or concerns for personal safety.   Client denies current or recent suicidal ideation or behaviors.   Client denies current or recent homicidal ideation or behaviors.   Client denies current or recent self injurious behavior or ideation.   Client denies other safety concerns.   A safety and risk management plan has not been developed at this time, however client was given the after-hours number / 911 should there be a change in any of these risk factors.     Appearance:   Appropriate    Eye Contact:   Good    Psychomotor Behavior: Normal    Attitude:   Cooperative    Orientation:   All   Speech    Rate / Production: Normal     Volume:  Normal    Mood:    Normal   Affect:    Appropriate  Bright    Thought Content:  Referential Thinking    Thought Form:  Goal Directed    Insight:    Fair      Medication Review:   No changes to current psychiatric medication(s)     Medication Compliance:   Yes     Changes in Health Issues:   None reported     Chemical Use Review:   Substance Use: Chemical use reviewed, no active concerns identified      Tobacco Use: No current tobacco use.       Collateral Reports Completed:   Not Applicable    PLAN: (Client Tasks / Therapist Tasks / Other) Past sessions: Worked on tx plan in session today.  Client will engage in thought stopping process to develop awareness about when he starts to feel anger or irritability. Work on positive communication skills with wife, engage in positive communication sessions with her weekly and use \"I\" statements.  Continue to use mindfulness HOANG when client needs to regulate his mood. Scheduled additional counseling sessions. Client will start " "documenting and journaling feelings and thoughts in a journal and concentrate on strengths and 3 positives or things that he accomplishes each day and what bring him edward.  Client having difficulty in his relationship and continuing to share with his wife how he feels and thinks about issues in his marriage.  Continue to engage in activities that distract him form negative feelings and thoughts. Concentrate on good self care activities that improve his mood.Client is communicating better with his partner, they have scheduled couple's counseling.  Client is on a weight loss program and is feeling better about this and is also working on regulating his anger better and concentrating on positives about self and things that he accomplishes and journaling when he needs too. Client is having more difficulty with relationship issues, they are trying to find a marriage counselor they both can relate too.  They continue to look into this.  We discussed asking for what his wife wants.  Continuing to be open to her awareness about an issue, don't get caught up in who is right or wrong.  Bring down defenses, communicate about everyday things and continue practicing positive relationship skills.  Client to be open about feelings, thought, needs and wants.  Not to get caught up in giving in to make the peace because that can lead to resentment. Client working on exercise, getting together with friends, eating better and sleeping well.  Concentrating on positive affirmations and strengths. Client given assignment to try with wife \"going down memory lesia\"  Concentrate on positive aspects of the relationship.  Client was walking more, engaging with friends more and engaging in activities, had some anxiety one evening and not certain what creates this.  Will think about possibly journaling what triggers his anxiety in the future.  Concentrate on applying self compassion to core hurts that are triggered, ask spouse what she needs and " wants from him, listen more and will not try and get stuck in powerlessness and more inner power to deal with issues.  Continue couple's counseling, think about getting a dog, and apply self compassion to core hurts and ask wife she wants from client.  Client to continue with exercise, positive activities that improve his mood, continued couple's therapy and engaging in positive communication strategies with wife.  Continue positive affirmations and concentrating on strengths daily. Client and wife decided to separate and most likely move towards a divorce.  This has been difficult for both of them but they have been amicable to each other.  We processed and discussed stages of loss, continue to work on positives in his life like weight loss, exercise, getting a dog, concentrating on positives and strengths in life.  Client talked a little about his relationship with his sister and possibly repairing this in the future. Relationship has been strained for a couple of years. Client is doing well dealing with the decision to end his marriage.  Client is hoping to rescue a dog, continue with exercise, engage in CBT skills if needed, continue positive self care skills and reach out to support system if needed. Reviewed stages of grief and loss, client also is engaging in activities with friends that lift his mood. Client went to finalize his divorce with his wife.  Mixed feelings about this that we processed in session.  Discussed the stages of loss and where he was at with this. Client go a rescue dog from a shelter and really looking forward to having a pet again.  Concentrated on affirmations and strengths and engaging in positive self care activities such as playing and walking his dog, exercise, continue weight mgmt goal, reach out to friends and schedule activities which creates accountability for client to follow through with things.  Client has finalized his divorce.  Is connecting with family and support system.  " We processed stages of loss and client is working through the stages.  Client got a dog which is helping him exercise more and the dog has been a positive emotional support to client.  Client has positive activities to look forward to over the summer, has anew roommate moving in the middle of June.  Ex-wife will be moving out then.  Client will continue to remain active, be aware of any depression symptoms that may pop up and continue to concentrate on positive healing activities to help him through the loss. Client has had some up's and down's in regard to how he is feeling about his divorce, some sadness but not to much of it.  Is sorting out all the issues related to ending his marriage.  Keeping really busy with his job and his [employer] company.  All of these activities are difficult for client to engage in some really positive self care for self.  Uncertain about having a roommate move in so soon and may think about a later date for this to happen.  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  Apply self compassion to core hurts that are triggered, such as feeling disregarded or disrespected, let go of the resentment and work through for self.  Client has a new roommate and that is working out well.  Client is reaching out to friends and engaging in healthy positive distraction activities.  Has connected with ex-wife and that interaction went well.  Continues to have triggers of sadness about the loss and that is healthy for client.  Is crying at times but not too often.  His dog has been a healthy distraction and he is walking more.  Would like to increase his exercise and watch weight better and will continue to work on this.  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 Client is doing well and having less sadness and engaging with support system, activities that he enjoys and starting to date again.  Client is procrastinating more and having some difficulty with sleep.  He was given a sleep hygiene tip sleep to try a few of these things to improve his sleep and start tackling the list of things that he has procrastinated.  He will get a chalk board which has helped him get to the things he wants to get done and connect to the feeling he get's when accomplishing and following through with the list. Work on increasing his exercise. Client is dating and is excited about a new woman in his life.  He is feeling really good about the relationship and we processed any feelings he was having due to it being so quickly after his divorce.  We processed feelings and client has started to journal more his thoughts and feelings.  Mood has improved and he is feeling good about where his life is at currently.  Winter is coming up and at times his depression increases during this season.  Client is wanting to continue counseling until through the winter and as he works through the dynamics of this new relationship.  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 Current session: Client is very happy with his new relationship and this has improved his mood.  He feels good about this and is in the romantic stage of the relationship.  We processed how this has impacted the work he has done in regard to the loss of his past relationship and he stated that he dos not think it has impacted him much. Client is engaging in healthy activities, looking forward to his [employer] position that will be coming up.  Reinforced the positive steps he has taken to feel better about self and effectively dealing with the loss of his relationship to date. 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                                      Hermilo oBrja, LICSW                                                         ________________________________________________________________________    Treatment Plan    Client's Name: Torres Cuevas  YOB: 1980    Date: 1-16-18    DSM-V Diagnoses:  296.31 (F33.0) Major Depressive Disorder, Recurrent Episode, Mild _ and With anxious distress  Psychosocial & Contextual Factors: relationship stress, little interest in doing things, anger regulation issues, financial stress    WHODAS: Completed first session    Referral / Collaboration:  Referral to another professional/service is not indicated at this time..    Anticipated number of session or this episode of care: 15      MeasurableTreatment Goal(s) related to diagnosis / functional impairment(s)  Goal 1: Client will decrease symptoms of depression and anxiety and return to normal daily functioning    I will know I've met my goal when I feel better about myself and my relationship and have less anger in my life.      Objective #A (Client Action)    Client will use cognitive strategies identified in therapy to challenge anxious thoughts and depressive thoughts.  Status: Continued - Date(s):1-16-18, 7-17-18, 10-15-18     Intervention(s)  Therapist will teach thought stopping process and CBT skills to regulate his mood.  Client will practice daily until it becomes automatic to regulate his mood better. .    Objective #B  Client will improve his overall self esteem and mood.  Status: Continued - Date(s): 1-16-18, 7-17-18, 10-15-18    Intervention(s)  Therapist will assign homework Complete self esteem schedule and work on areas that need improvement.  Client will concentrate on engaging in activities that improve his self esteem and write down what he accomplishes each day to improve his self esteem. .    Objective #C  Client will learn effective ways to  regulate his anger..  Status: Completed - Date: 10-15-18     Intervention(s)  Therapist will assign homework Client will Learn the HEALS technique to regulate his anger daily or as needed. .        Client has reviewed and agreed to the above plan.      Hermilo Borja, [facility]  January 16, 2018

## 2018-10-15 NOTE — MR AVS SNAPSHOT
"                  MRN:[id number]                      After Visit Summary   10/15/2018    Torres Cuevas    MRN: [id number]           Visit Information        Provider Department      10/15/2018 4:30 PM Hermilo Borja [facility] Generic      Your next 10 appointments already scheduled     2018  5:00 PM CST   Return Visit with Hermilo RochaTORY melo   [facility] ([facility])    [address] [place] [id number] [phone]              MyChart Information     [employer] lets you send messages to your doctor, view your test results, renew your prescriptions, schedule appointments and more. To sign up, go to [email or web address] . Click on \"Log in\" on the left side of the screen, which will take you to the Welcome page. Then click on \"Sign up Now\" on the right side of the page.     You will be asked to enter the access code listed below, as well as some personal information. Please follow the directions to create your username and password.     Your access code is: [email or web address]  Expires: 2018  5:46 PM     Your access code will  in 90 days. If you need help or a new code, please call your [place] clinic or [phone].        Care EveryWhere ID     This is your Care EveryWhere ID. This could be used by other organizations to access your [place] medical records  [id number]        Equal Access to Services     RAMESH MANLEY AH: Hadii jeison zavalao Sodayna, waaxda luqadaha, qaybta kaalmada adeegyada, olga corea. So [facility] [phone].    ATENCIÓN: Si habla español, tiene a camacho disposición servicios gratuitos de asistencia lingüística. Llame al [phone].    We comply with applicable federal civil rights laws and [place] laws. We do not discriminate on the basis of race, color, national origin, age, disability, sex, sexual orientation, or gender " identity.

## 2018-11-02 ENCOUNTER — TELEPHONE (OUTPATIENT)
Dept: FAMILY MEDICINE | Facility: CLINIC | Age: 38
End: 2018-11-02

## 2018-11-02 NOTE — TELEPHONE ENCOUNTER
Patient has not been seen since 3/1017..  Would PCP like patient to come in and be seen first.  Zee Block RN [facility] Triage.

## 2018-11-02 NOTE — TELEPHONE ENCOUNTER
Pt calling requesting  place a referral for sleep medicine. Pt states he has been snoring and would like to have this checked. Please call pt at [phone] OK to RAMONE Thank You.    Muriel Farrell, [facility]  Diabetes and Nutrition Scheduling

## 2018-11-02 NOTE — TELEPHONE ENCOUNTER
Called patient and advised him of the message below.  Appointment made for Monday with PCP.  Patient stated understanding and agreeable with the plan of care. Zee Block, RN-BSN, CPC Triage.

## 2018-11-02 NOTE — TELEPHONE ENCOUNTER
I always see the patient before I make a referral.  Please notify him of this and have him schedule an appointment

## 2018-11-05 ENCOUNTER — OFFICE VISIT (OUTPATIENT)
Dept: FAMILY MEDICINE | Facility: CLINIC | Age: 38
End: 2018-11-05
Payer: COMMERCIAL

## 2018-11-05 VITALS
SYSTOLIC BLOOD PRESSURE: 144 MMHG | HEART RATE: 90 BPM | BODY MASS INDEX: 40.76 KG/M2 | TEMPERATURE: 97.4 F | DIASTOLIC BLOOD PRESSURE: 80 MMHG | OXYGEN SATURATION: 97 % | WEIGHT: 274 LBS

## 2018-11-05 DIAGNOSIS — E66.01 MORBID OBESITY (H): ICD-10-CM

## 2018-11-05 DIAGNOSIS — F32.4 MAJOR DEPRESSIVE DISORDER WITH SINGLE EPISODE, IN PARTIAL REMISSION (H): ICD-10-CM

## 2018-11-05 DIAGNOSIS — R06.83 SNORING: Primary | ICD-10-CM

## 2018-11-05 PROCEDURE — 99213 OFFICE O/P EST LOW 20 MIN: CPT | Performed by: FAMILY MEDICINE

## 2018-11-05 NOTE — MR AVS SNAPSHOT
After Visit Summary   11/5/2018    Torres Cuevas    MRN: [id number]           Patient Information     Date Of Birth          1980        Visit Information        Provider Department      11/5/2018 11:00 AM Chepe Palm MD [facility]        Today's Diagnoses     Snoring    -  1    Morbid obesity (H)           Follow-ups after your visit        Additional Services     SLEEP EVALUATION & MANAGEMENT REFERRAL - [facility]  [phone] (Age 15 and up)       Please be aware that coverage of these services is subject to the terms and limitations of your health insurance plan.  Call member services at your health plan with any benefit or coverage questions.      Please bring the following to your appointment:    >>   List of current medications   >>   This referral request   >>   Any documents/labs given to you for this referral                      Follow-up notes from your care team     Return in about 3 months (around 2/5/2019) for BP Recheck.      Your next 10 appointments already scheduled     Nov 14, 2018  5:00 PM CST   Return Visit with TORY Canchola   [facility]    [address] [place] [address] [phone]              Future tests that were ordered for you today     Open Future Orders        Priority Expected Expires Ordered    SLEEP EVALUATION & MANAGEMENT REFERRAL - [facility]  [phone] (Age 15 and up) Routine  11/5/2019 11/5/2018            Who to contact     If you have questions or need follow up information about today's clinic visit or your schedule please contact [facility] directly at [phone].  Normal or non-critical lab and imaging results will be communicated to you by MyChart, letter or phone within 4 business days after the clinic has received the results. If you  "do not hear from us within 7 days, please contact the clinic through D[employer] or phone. If you have a critical or abnormal lab result, we will notify you by phone as soon as possible.  Submit refill requests through Dime or call your pharmacy and they will forward the refill request to us. Please allow 3 business days for your refill to be completed.          Additional Information About Your Visit        [employer] Information     Dime lets you send messages to your doctor, view your test results, renew your prescriptions, schedule appointments and more. To sign up, go to [email or web address] . Click on \"Log in\" on the left side of the screen, which will take you to the Welcome page. Then click on \"Sign up Now\" on the right side of the page.     You will be asked to enter the access code listed below, as well as some personal information. Please follow the directions to create your username and password.     Your access code is: [email or web address]  Expires: 2018  4:46 PM     Your access code will  in 90 days. If you need help or a new code, please call your [facility] clinic or [phone].        Care EveryWhere ID     This is your Care EveryWhere ID. This could be used by other organizations to access your [facility] medical records  [id number]        Your Vitals Were     Pulse Temperature Pulse Oximetry BMI (Body Mass Index)          90 97.4  F (36.3  C) (Oral) 97% 40.76 kg/m2         Blood Pressure from Last 3 Encounters:   18 147/90   17 120/84   17 130/82    Weight from Last 3 Encounters:   18 274 lb (124.3 kg)   17 249 lb (112.9 kg)   17 258 lb (117 kg)                 Today's Medication Changes          These changes are accurate as of 18 11:23 AM.  If you have any questions, ask your nurse or doctor.               These medicines have changed or have updated prescriptions.        Dose/Directions    fluticasone 50 MCG/ACT spray   Commonly known as:  FLONASE "   This may have changed:  Another medication with the same name was removed. Continue taking this medication, and follow the directions you see here.   Used for:  Chronic rhinitis   Changed by:  Chepe Palm MD        Dose:  1-2 spray   Spray 1-2 sprays into both nostrils daily   Quantity:  1 Bottle   Refills:  11         Stop taking these medicines if you haven't already. Please contact your care team if you have questions.     escitalopram 20 MG tablet   Commonly known as:  LEXAPRO   Stopped by:  Chepe Palm MD           SUDAFED PO   Stopped by:  Chepe Palm MD                    Primary Care Provider Office Phone # Fax #    [facility] [phone]       [address] [place] [address]        Equal Access to Services     RAMESH MANLEY : Hadii jeison bocanegra hadasho Soomaali, waaxda luqadaha, qaybta kaalmada adeegyada, olga murphy haylala nelson . So [facility] [phone].    ATENCIÓN: Si habla español, tiene a camacho disposición servicios gratuitos de asistencia lingüística. Llame al [phone].    We comply with applicable federal civil rights laws and [place] laws. We do not discriminate on the basis of race, color, national origin, age, disability, sex, sexual orientation, or gender identity.            Thank you!     Thank you for choosing [facility]  for your care. Our goal is always to provide you with excellent care. Hearing back from our patients is one way we can continue to improve our services. Please take a few minutes to complete the written survey that you may receive in the mail after your visit with us. Thank you!             Your Updated Medication List - Protect others around you: Learn how to safely use, store and throw away your medicines at www.disposemymeds.org.          This list is accurate as of 11/5/18 11:23 AM.  Always use your most recent med list.                   Brand Name  Dispense Instructions for use Diagnosis    ALLEGRA PO      Take 180 mg by mouth daily as needed for allergies        fluticasone 50 MCG/ACT spray    FLONASE    1 Bottle    Spray 1-2 sprays into both nostrils daily    Chronic rhinitis

## 2018-11-05 NOTE — PROGRESS NOTES
SUBJECTIVE:   Torres Cuevas is a 38 year old male who presents to clinic today for the following health issues:      Requesting a referral for a sleep study      Problem list and histories reviewed & adjusted, as indicated.      Patient drinks a lot of coffee   He snores a lot and this is getting worse     He has depression that is well controlled   No one else in family has it   No large people in family     feels falls asleep really easily     Drinks a beer per day   He is big on salt       O: /90 (BP Location: Left arm, Patient Position: Sitting, Cuff Size: Adult Large)  Pulse 90  Temp 97.4  F (36.3  C) (Oral)  Wt 274 lb (124.3 kg)  SpO2 97%  BMI 40.76 kg/m2    Wt Readings from Last 4 Encounters:   11/05/18 274 lb (124.3 kg)   02/06/17 249 lb (112.9 kg)   01/03/17 258 lb (117 kg)   12/06/16 257 lb (116.6 kg)     Chest wall normal to inspection and palpation. Good excursion bilaterally. Lungs clear to auscultation. Good air movement bilaterally without rales, wheezes, or rhonchi.   Regular rate and  rhythm. S1 and S2 normal, no murmurs, clicks, gallops or rubs. No edema or JVD.    Neck size is 17.5 inches   Waist at umbilicus is 51 inches           ICD-10-CM    1. Snoring R06.83 SLEEP EVALUATION & MANAGEMENT REFERRAL - [facility]  [phone] (Age 15 and up)   2. Morbid obesity (H) E66.01    3. Major depressive disorder with single episode, in partial remission (H) F32.4      We discussed bp and relationship to sleep apnea, obesity and diet   He was told to work on weight loss   He has gained a significant amount of weight   He has gone through a divorce and he is doing well with his depression  He has stopped his antidepressant, but is still seeing the psychologist   bp is in mildly elevated category     Recheck bp in 3 months     Sent to be evaluated by the sleep doctor

## 2018-11-26 PROBLEM — F33.0 MAJOR DEPRESSIVE DISORDER, RECURRENT EPISODE, MILD WITH ANXIOUS DISTRESS (H): Chronic | Status: ACTIVE | Noted: 2018-01-16

## 2018-11-26 PROBLEM — E66.01 MORBID OBESITY (H): Chronic | Status: ACTIVE | Noted: 2018-11-05

## 2018-11-27 ENCOUNTER — OFFICE VISIT (OUTPATIENT)
Dept: SLEEP MEDICINE | Facility: CLINIC | Age: 38
End: 2018-11-27
Attending: FAMILY MEDICINE
Payer: COMMERCIAL

## 2018-11-27 VITALS
SYSTOLIC BLOOD PRESSURE: 151 MMHG | HEART RATE: 82 BPM | HEIGHT: 70 IN | WEIGHT: 267 LBS | DIASTOLIC BLOOD PRESSURE: 106 MMHG | BODY MASS INDEX: 38.22 KG/M2 | OXYGEN SATURATION: 95 %

## 2018-11-27 DIAGNOSIS — R06.89 DYSPNEA AND RESPIRATORY ABNORMALITY: ICD-10-CM

## 2018-11-27 DIAGNOSIS — G47.10 ORGANIC HYPERSOMNIA: ICD-10-CM

## 2018-11-27 DIAGNOSIS — R06.83 SNORING: Primary | ICD-10-CM

## 2018-11-27 DIAGNOSIS — E66.01 MORBID OBESITY (H): Chronic | ICD-10-CM

## 2018-11-27 DIAGNOSIS — R06.00 DYSPNEA AND RESPIRATORY ABNORMALITY: ICD-10-CM

## 2018-11-27 DIAGNOSIS — R03.0 ELEVATED BLOOD PRESSURE READING: ICD-10-CM

## 2018-11-27 DIAGNOSIS — G47.9 DISTURBANCE IN SLEEP BEHAVIOR: ICD-10-CM

## 2018-11-27 PROCEDURE — 99244 OFF/OP CNSLTJ NEW/EST MOD 40: CPT | Performed by: INTERNAL MEDICINE

## 2018-11-27 NOTE — PROGRESS NOTES
Sleep Consultation:    Date on this visit: 11/27/2018    Torres Cuevas is sent by Chepe Palm for a sleep consultation regarding snoring.    Primary Physician: Clinic, [facility]     Chief Complaint   Patient presents with     Sleep Problem     I've been snoring very loud lately. My girlfriend says the rythym is eradict and it might be because I'm not breathing.         Torres goes to sleep at 11-12 PM during the week. He gets up at 7:00 AM with an alarm. Torres denies difficulty falling asleep.  He wakes up 1-2 times a night.  Torres wakes up to change positions.  On weekends, Torres goes to sleep at 1 AM.  He wakes up at 10:00 AM without an alarm depending on his job. .     Patient does occasionally use electronics in bed and does not watch TV in bed.     Torres does snore snoring is very loud. Patient does have a regular bed partner. He may have witnessed apneas. They never sleep separately, but she wears headphones and ear plugs.  Patient sleeps on his back < side. He has occasional morning headaches, denies no restless legs.     Torres has rare sleep talking and denies any sleep walking and dream enactment.    Torres has occasional reflux at night.      Patient's [place] Sleepiness score 12/24 consistent with excessive  daytime sleepiness.      Torres naps 1-2 times per week for 60 minutes. He takes some inadvertant naps.  He admits struggling to stay awake while driving. He uses 2-3 cups/day of tea. Last caffeine intake is usually before 4 PM.    Allergies:    Allergies   Allergen Reactions     Seasonal Allergies        Medications:    Current Outpatient Prescriptions   Medication Sig Dispense Refill     Fexofenadine HCl (ALLEGRA PO) Take 180 mg by mouth daily as needed for allergies       fluticasone (FLONASE) 50 MCG/ACT spray Spray 1-2 sprays into both nostrils daily (Patient not taking: Reported on 11/27/2018) 1 Bottle 11     [DISCONTINUED] sertraline (ZOLOFT) 50 MG tablet Take 1 tablet  (50 mg) by mouth daily 30 tablet 1       Problem List:  Patient Active Problem List    Diagnosis Date Noted     Morbid obesity (H) 11/05/2018     Priority: Medium     Major depressive disorder, recurrent episode, mild with anxious distress (H) 01/16/2018     Priority: Medium     Seasonal allergies      Priority: Low        Past Medical/Surgical History:  No past medical history on file.  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 [employer]     Social History Main Topics     Smoking status: Never Smoker     Smokeless tobacco: Never Used     Alcohol use Yes      Comment: 4 per week     Drug use: No     Sexual activity: Yes     Partners: Female     Other Topics Concern     Parent/Sibling W/ Cabg, Mi Or Angioplasty Before 65f 55m? No     Social History Narrative       Family History:  Family History   Problem Relation Age of Onset     Liver Disease Paternal Grandfather      alcohol related most likely     Myocardial Infarction Maternal Grandfather      in his 60s     Coronary Artery Disease Maternal Uncle      Diabetes No family hx of      Colon Cancer No family hx of        Review of Systems:  A complete review of systems reviewed by me is negative with the exeption of what has been mentioned in the history of present illness.  CONSTITUTIONAL:  NEGATIVE for  night sweats  EYES: NEGATIVE for changes in vision, blind spots, double vision.  ENT:  POSITIVE for  sinus pain  CARDIAC:  POSITIVE for  hypertension   NEUROLOGIC: NEGATIVE headaches, weakness or numbness in the arms or legs.  DERMATOLOGIC: NEGATIVE for rashes, new moles or change in mole(s)  PULMONARY:  POSITIVE for  SOB with activity, dry cough and productive  "cough  GASTROINTESTINAL: NEGATIVE for nausea or vomitting, loose or watery stools, fat or grease in stools, constipation, abdominal pain, bowel movements black in color or blood noted.  GENITOURINARY: NEGATIVE for pain during urination, blood in urine, urinating more frequently than usual, irregular menstrual periods.  MUSCULOSKELETAL: NEGATIVE for muscle pain, bone or joint pain, swollen joints.  ENDOCRINE: NEGATIVE for increased thirst or urination, diabetes.  LYMPHATIC: NEGATIVE for swollen lymph nodes, lumps or bumps in the breasts or nipple discharge.    Physical Examination:  Vitals: BP (!) 151/106  Pulse 82  Ht 1.772 m (5' 9.75\")  Wt 121.1 kg (267 lb)  SpO2 95%  BMI 38.59 kg/m2  BMI= Body mass index is 38.59 kg/(m^2).    Neck Cir (cm): 43 cm    Moore Total Score 11/27/2018   Total score - Moore 12       MARION Total Score: 9 (11/27/18 1000)    GENERAL APPEARANCE: healthy, alert and no distress  EYES: Eyes grossly normal to inspection and conjunctivae and sclerae normal  HENT: ear canals and TM's normal and nose and mouth without ulcers or lesions  NECK: no adenopathy, no asymmetry, masses, or scars and thyroid normal to palpation  RESP: lungs clear to auscultation - no rales, rhonchi or wheezes  CV: regular rates and rhythm, normal S1 S2, no S3 or S4 and no murmur, click or rub  ABDOMEN: soft, nontender, without hepatosplenomegaly or masses  MS: extremities normal- no gross deformities noted  NEURO: Normal strength and tone, mentation intact, speech normal and cranial nerves 2-12 intact  PSYCH: mentation appears normal and affect normal/bright  Mallampati Class: I.  Tonsillar Stage: 1  hidden by pillars.    Impression/Plan:    Loud snoring, possibole apneas, excessive daytime sleepiness (ESS 12), occasional morning headaches, occasional reflux at night, elevated blood pressure, obesity. Patient appears to be a good candidate for Home Sleep Test STOPBANG  5    Patient to follow up with Primary Care " provider regarding elevated blood pressure.     Literature provided regarding sleep apnea.      He will follow up with me in approximately two weeks after his sleep study has been competed to review the results and discuss plan of care.       Polysomnography reviewed.  Obstructive sleep apnea reviewed.  Complications of untreated sleep apnea were reviewed.    Darrin Napier     CC: Chepe Palm

## 2018-11-27 NOTE — PATIENT INSTRUCTIONS

## 2018-11-27 NOTE — NURSING NOTE
"Chief Complaint   Patient presents with     Sleep Problem     I've been snoring very loud lately. My girlfriend says the rythym is eradict and it might be because I'm not breathing.        Initial BP (!) 151/106  Pulse 82  Ht 1.772 m (5' 9.75\")  Wt 121.1 kg (267 lb)  SpO2 95%  BMI 38.59 kg/m2 Estimated body mass index is 38.59 kg/(m^2) as calculated from the following:    Height as of this encounter: 1.772 m (5' 9.75\").    Weight as of this encounter: 121.1 kg (267 lb).    Medication Reconciliation: complete    Neck circumference: 16.75 inches / 43 centimeters.        "

## 2018-11-27 NOTE — MR AVS SNAPSHOT
After Visit Summary   11/27/2018    Torres Cuevas    MRN: [id number]           Patient Information     Date Of Birth          1980        Visit Information        Provider Department      11/27/2018 11:00 AM Darrin Napier MD [facility]        Today's Diagnoses     Snoring    -  1    Elevated blood pressure reading        Morbid obesity (H)        Organic hypersomnia        Disturbance in sleep behavior        Dyspnea and respiratory abnormality          Care Instructions      Your BMI is Body mass index is 38.59 kg/(m^2).  Weight management is a personal decision.  If you are interested in exploring weight loss strategies, the following discussion covers the approaches that may be successful. Body mass index (BMI) is one way to tell whether you are at a healthy weight, overweight, or obese. It measures your weight in relation to your height.  A BMI of 18.5 to 24.9 is in the healthy range. A person with a BMI of 25 to 29.9 is considered overweight, and someone with a BMI of 30 or greater is considered obese. More than two-thirds of American adults are considered overweight or obese.  Being overweight or obese increases the risk for further weight gain. Excess weight may lead to heart disease and diabetes.  Creating and following plans for healthy eating and physical activity may help you improve your health.  Weight control is part of healthy lifestyle and includes exercise, emotional health, and healthy eating habits. Careful eating habits lifelong are the mainstay of weight control. Though there are significant health benefits from weight loss, long-term weight loss with diet alone may be very difficult to achieve- studies show long-term success with dietary management in less than 10% of people. Attaining a healthy weight may be especially difficult to achieve in those with severe obesity. In some cases, medications, devices and surgical management might be considered.  What can  you do?  If you are overweight or obese and are interested in methods for weight loss, you should discuss this with your provider.     Consider reducing daily calorie intake by 500 calories.     Keep a food journal.     Avoiding skipping meals, consider cutting portions instead.    Diet combined with exercise helps maintain muscle while optimizing fat loss. Strength training is particularly important for building and maintaining muscle mass. Exercise helps reduce stress, increase energy, and improves fitness. Increasing exercise without diet control, however, may not burn enough calories to loose weight.       Start walking three days a week 10-20 minutes at a time    Work towards walking thirty minutes five days a week     Eventually, increase the speed of your walking for 1-2 minutes at time    In addition, we recommend that you review healthy lifestyles and methods for weight loss available through the National Institutes of Health patient information sites:  http://win.niddk.nih.gov/publications/index.htm    And look into health and wellness programs that may be available through your health insurance provider, employer, local community center, or maylin club.    Weight management plan: Patient was referred to their PCP to discuss a diet and exercise plan.              Follow-ups after your visit        Follow-up notes from your care team     Return in about 1 day (around 11/28/2018) for Routine Visit.      Your next 10 appointments already scheduled     Dec 03, 2018 11:00 AM CST   HST  with BK BED 5   [facility] ([facility])    [address] [place] [id number]   [phone]            Dec 04, 2018 11:30 AM CST   HST Drop Off with BK BED 5   [facility] ([facility])    [address] [place] [address]   [phone]            Dec 07, 2018  9:00 AM CST   Return  "Visit with TORY Canchola   [facility] ([facility])    [address] [facility] [id number] [phone]            Dec 13, 2018  2:30 PM CST   Telephone Visit with Darrin Napier MD   [facility] ([facility])    [address] [address] [id number] [phone]           Note: this is not an onsite visit; there is no need to come to the facility.              Future tests that were ordered for you today     Open Future Orders        Priority Expected Expires Ordered    HST-Home Sleep Apnea Test Routine  5/29/2019 11/27/2018            Who to contact     If you have questions or need follow up information about today's clinic visit or your schedule please contact [facility] directly at [phone].  Normal or non-critical lab and imaging results will be communicated to you by MyChart, letter or phone within 4 business days after the clinic has received the results. If you do not hear from us within 7 days, please contact the clinic through MyChart or phone. If you have a critical or abnormal lab result, we will notify you by phone as soon as possible.  Submit refill requests through [employer] or call your pharmacy and they will forward the refill request to us. Please allow 3 business days for your refill to be completed.          Additional Information About Your Visit        Care EveryWhere ID     This is your Care EveryWhere ID. This could be used by other organizations to access your [facility] medical records  [id number]        Your Vitals Were     Pulse Height Pulse Oximetry BMI (Body Mass Index)          82 1.772 m (5' 9.75\") 95% 38.59 kg/m2         Blood Pressure from Last 3 Encounters:   11/27/18 (!) 151/106   11/05/18 144/80   02/06/17 120/84    Weight from Last 3 Encounters:   11/27/18 121.1 kg (267 lb)   11/05/18 124.3 kg (274 lb)   02/06/17 112.9 kg (249 " lb)              We Performed the Following     SLEEP EVALUATION & MANAGEMENT REFERRAL - ADULT -[facility]  [phone] (Age 15 and up)        Primary Care Provider Office Phone # Fax #    [facility] [phone] [address] [facility] [address]        Equal Access to Services     RAMESH MANLEY : Hadii aad ku hadasho Soomaali, waaxda luqadaha, qaybta kaalmada adeegyada, waxay idiin hayaan adeeg kharash la'aan ah. So [facility] [phone].    ATENCIÓN: Si habla español, tiene a camacho disposición servicios gratuitos de asistencia lingüística. Rupa al [phone].    We comply with applicable federal civil rights laws and [place] laws. We do not discriminate on the basis of race, color, national origin, age, disability, sex, sexual orientation, or gender identity.            Thank you!     Thank you for choosing [facility]  for your care. Our goal is always to provide you with excellent care. Hearing back from our patients is one way we can continue to improve our services. Please take a few minutes to complete the written survey that you may receive in the mail after your visit with us. Thank you!             Your Updated Medication List - Protect others around you: Learn how to safely use, store and throw away your medicines at www.disposemymeds.org.          This list is accurate as of 11/27/18 11:37 AM.  Always use your most recent med list.                   Brand Name Dispense Instructions for use Diagnosis    ALLEGRA PO      Take 180 mg by mouth daily as needed for allergies        fluticasone 50 MCG/ACT nasal spray    FLONASE    1 Bottle    Spray 1-2 sprays into both nostrils daily    Chronic rhinitis

## 2018-12-03 ENCOUNTER — OFFICE VISIT (OUTPATIENT)
Dept: SLEEP MEDICINE | Facility: CLINIC | Age: 38
End: 2018-12-03
Payer: COMMERCIAL

## 2018-12-03 DIAGNOSIS — G47.9 DISTURBANCE IN SLEEP BEHAVIOR: ICD-10-CM

## 2018-12-03 DIAGNOSIS — R06.00 DYSPNEA AND RESPIRATORY ABNORMALITY: ICD-10-CM

## 2018-12-03 DIAGNOSIS — R06.89 DYSPNEA AND RESPIRATORY ABNORMALITY: ICD-10-CM

## 2018-12-03 DIAGNOSIS — R03.0 ELEVATED BLOOD PRESSURE READING: ICD-10-CM

## 2018-12-03 DIAGNOSIS — G47.10 ORGANIC HYPERSOMNIA: ICD-10-CM

## 2018-12-03 DIAGNOSIS — E66.01 MORBID OBESITY (H): Chronic | ICD-10-CM

## 2018-12-03 DIAGNOSIS — R06.83 SNORING: ICD-10-CM

## 2018-12-03 PROCEDURE — G0399 HOME SLEEP TEST/TYPE 3 PORTA: HCPCS | Performed by: INTERNAL MEDICINE

## 2018-12-03 NOTE — MR AVS SNAPSHOT
After Visit Summary   12/3/2018    Torres Cuevas    MRN: [id number]           Patient Information     Date Of Birth          1980        Visit Information        Provider Department      12/3/2018 11:00 AM BK BED 5 [facility]        Today's Diagnoses     Snoring        Elevated blood pressure reading        Morbid obesity (H)        Organic hypersomnia        Disturbance in sleep behavior        Dyspnea and respiratory abnormality           Follow-ups after your visit        Your next 10 appointments already scheduled     Dec 04, 2018 11:30 AM CST   HST Drop Off with BK BED 5   [facility] ([facility])    [address]  [place] [address]   [phone]            Dec 07, 2018  9:00 AM CST   Return Visit with TORY Canchola   [facility] ([facility])    [address] [id number] [phone]            Dec 13, 2018  2:30 PM CST   Telephone Visit with Darrin Napeir MD   [facility] ([facility])    [address]  [place] [address]   [phone]           Note: this is not an onsite visit; there is no need to come to the facility.              Who to contact     If you have questions or need follow up information about today's clinic visit or your schedule please contact [facility] directly at [phone].  Normal or non-critical lab and imaging results will be communicated to you by MyChart, letter or phone within 4 business days after the clinic has received the results. If you do not hear from us within 7 days, please contact the clinic through MyChart or phone. If you have a critical or abnormal lab result, we will notify you by phone as soon as possible.  Submit refill requests through [employer] or call your pharmacy and they will forward the  refill request to us. Please allow 3 business days for your refill to be completed.          Additional Information About Your Visit        Care EveryWhere ID     This is your Care EveryWhere ID. This could be used by other organizations to access your [place] medical records  [id number]         Blood Pressure from Last 3 Encounters:   11/27/18 (!) 151/106   11/05/18 144/80   02/06/17 120/84    Weight from Last 3 Encounters:   11/27/18 121.1 kg (267 lb)   11/05/18 124.3 kg (274 lb)   02/06/17 112.9 kg (249 lb)              We Performed the Following     HST-Home Sleep Apnea Test        Primary Care Provider Office Phone # Fax #    [facility] [phone]       [address] [place] [id number]        Equal Access to Services     RAMESH MANLEY : Hadii aad ku hadasho Sodayna, waaxda luqadaha, qaybta kaalmada adekevinyamaricel, olga corea. So [facility] [phone].    ATENCIÓN: Si habla español, tiene a camacho disposición servicios gratuitos de asistencia lingüística. Rupa al [phone].    We comply with applicable federal civil rights laws and [place] laws. We do not discriminate on the basis of race, color, national origin, age, disability, sex, sexual orientation, or gender identity.            Thank you!     Thank you for choosing [facility]  for your care. Our goal is always to provide you with excellent care. Hearing back from our patients is one way we can continue to improve our services. Please take a few minutes to complete the written survey that you may receive in the mail after your visit with us. Thank you!             Your Updated Medication List - Protect others around you: Learn how to safely use, store and throw away your medicines at www.disposemymeds.org.          This list is accurate as of 12/3/18 12:11 PM.  Always use your most recent med list.                   Brand Name Dispense Instructions for use  Diagnosis    ALLEGRA PO      Take 180 mg by mouth daily as needed for allergies        fluticasone 50 MCG/ACT nasal spray    FLONASE    1 Bottle    Spray 1-2 sprays into both nostrils daily    Chronic rhinitis

## 2018-12-04 ENCOUNTER — DOCUMENTATION ONLY (OUTPATIENT)
Dept: SLEEP MEDICINE | Facility: CLINIC | Age: 38
End: 2018-12-04
Payer: COMMERCIAL

## 2018-12-04 NOTE — PROGRESS NOTES
This HSAT was performed using a Noxturnal T3 device which recorded snore, sound, movement activity, body position, nasal pressure, oronasal thermal airflow, pulse, oximetry and both chest and abdominal respiratory effort. HSAT data was restricted to the time patient states they were in bed.     HSAT was scored using 1B 4% hypopnea rule.     HST AHI (Non-PAT): 10.1  Snoring was reported as mild.  Time with SpO2 below 89% was 0 minutes.   Overall signal quality was good     Pt will follow up with sleep provider to determine appropriate therapy.

## 2018-12-07 ENCOUNTER — OFFICE VISIT (OUTPATIENT)
Dept: PSYCHOLOGY | Facility: CLINIC | Age: 38
End: 2018-12-07
Payer: COMMERCIAL

## 2018-12-07 DIAGNOSIS — F33.0 MAJOR DEPRESSIVE DISORDER, RECURRENT EPISODE, MILD WITH ANXIOUS DISTRESS (H): Primary | Chronic | ICD-10-CM

## 2018-12-07 PROCEDURE — 90834 PSYTX W PT 45 MINUTES: CPT | Performed by: SOCIAL WORKER

## 2018-12-07 ASSESSMENT — ANXIETY QUESTIONNAIRES
3. WORRYING TOO MUCH ABOUT DIFFERENT THINGS: NOT AT ALL
IF YOU CHECKED OFF ANY PROBLEMS ON THIS QUESTIONNAIRE, HOW DIFFICULT HAVE THESE PROBLEMS MADE IT FOR YOU TO DO YOUR WORK, TAKE CARE OF THINGS AT HOME, OR GET ALONG WITH OTHER PEOPLE: NOT DIFFICULT AT ALL
2. NOT BEING ABLE TO STOP OR CONTROL WORRYING: SEVERAL DAYS
7. FEELING AFRAID AS IF SOMETHING AWFUL MIGHT HAPPEN: SEVERAL DAYS
5. BEING SO RESTLESS THAT IT IS HARD TO SIT STILL: NOT AT ALL
GAD7 TOTAL SCORE: 3
1. FEELING NERVOUS, ANXIOUS, OR ON EDGE: SEVERAL DAYS
6. BECOMING EASILY ANNOYED OR IRRITABLE: NOT AT ALL

## 2018-12-07 ASSESSMENT — PATIENT HEALTH QUESTIONNAIRE - PHQ9
SUM OF ALL RESPONSES TO PHQ QUESTIONS 1-9: 2
5. POOR APPETITE OR OVEREATING: NOT AT ALL

## 2018-12-07 NOTE — PROGRESS NOTES
"                                               Progress Note    Client Name: Torres Cuevas  Date: 12-07-18         Service Type: Individual      Session Start Time: 9:00  Session End Time: 9:45      Session Length: 45     Session #: 18     Attendees: Client    Treatment Plan Last Reviewed: Started tx plan today 1-16-18, 7-17-18, 10-15-18  PHQ-9 / KANDY-7 : Completed this session; Small increase in anxiety GAD7 score and PHQ9 depression score      DATA      Progress Since Last Session (Related to Symptoms / Goals / Homework):   Symptoms: Stable-small increase in anxiety and depression scores due to increased worry about work performance and busy day to day schedule.     Homework: Achieved / completed to satisfactionPrevious sessions: Worked on tx plan in session today.  Client will engage in thought stopping process to develop awareness about when he starts to feel anger or irritability. Work on positive communication skills with wife, engage in positive communication sessions with her weekly and use \"I\" statements.  Continue to use mindfulness HOANG when client needs to regulate his mood. Client will start documenting and journaling feelings and thoughts in a journal and concentrate on strengths and 3 positives or things that he accomplishes each day and what bring him edward.  Client having difficulty in his relationship and continuing to share with his wife how he feels and thinks about issues in his marriage.  Continue to engage in activities that distract him form negative feelings and thoughts.Client is communicating better with his partner, they have scheduled couple's counseling.  Client is on a weight loss program and is feeling better about this and is also working on regulating his anger better and concentrating on positives about self and things that he accomplishes and journaling when he needs too. Client is having more difficulty with relationship issues, they are trying to find a marriage counselor they both " "can relate too.  They continue to look into this.  We discussed asking for what his wife wants.  Continuing to be open to her awareness about an issue, don't get caught up in who is right or wrong.  Bring down defenses, communicate about everyday things and continue practicing positive relationship skills.  Client to be open about feelings, thought, needs and wants.  Not to get caught up in giving in to make the peace because that can lead to resentment. Client working on exercise, getting together with friends, eating better and sleeping well.  Concentrating on positive affirmations and strengths.  Client given assignment to try with wife \"going down memory lesia\"  Concentrate on positive aspects of the relationship. Client was walking more, engaging with friends more and engaging in activities, had some anxiety one evening and not certain what creates this.  Will think about possibly journaling what triggers his anxiety in the future.  Concentrate on applying self compassion to core hurts that are triggered, ask spouse what she needs and wants from him, listen more and will not try and get stuck in powerlessness and more inner power to deal with issues.  Continue couple's counseling, think about getting a dog, and apply self compassion to core hurts and ask wife she wants from client.  Client to continue with exercise, positive activities that improve his mood, continued couple's therapy and engaging in positive communication strategies with wife.  Continue positive affirmations and concentrating on strengths daily. Client and wife decided to separate and most likely move towards a divorce.  This has been difficult for both of them but they have been amicable to each other.  We processed and discussed stages of loss, continue to work on positives in his life like weight loss, exercise, getting a dog, concentrating on positives and strengths in life.  Client talked a little about his relationship with his sister and " possibly repairing this in the future. Relationship has been strained for a couple of years.  Client is doing well dealing with the decision to end his marriage.  Client is hoping to rescue a dog, continue with exercise, engage in CBT skills if needed, continue positive self care skills and reach out to support system if needed. Client went to finalize his divorce with his wife.  Mixed feelings about this that we processed in session.  Discussed the stages of loss and where he was at with this. Client go a rescue dog from a shelter and really looking forward to having a pet again.  Concentrated on affirmations and strengths and engaging in positive self care activities such as playing and walking his dog, exercise, continue weight mgmt goal, reach out to friends and schedule activities which creates accountability for client to follow through with things. Client has finalized his divorce.  Is connecting with family and support system.  We processed stages of loss and client is working through the stages.  Client got a dog which is helping him exercise more and the dog has been a positive emotional support to client.  Client has positive activities to look forward to over the summer, has anew roommate moving in the middle of June.  Ex-wife will be moving out then.  Client will continue to remain active, be aware of any depression symptoms that may pop up and continue to concentrate on positive healing activities to help him through the loss. Client has had some up's and down's in regard to how he is feeling about his divorce, some sadness but not to much of it.  Is sorting out all the issues related to ending his marriage.  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  All of these activities are difficult for client to engage in some really positive self care for self.  Uncertain about having a roommate move in so soon and may think about a later date for this to happen.  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  Apply self compassion to core hurts that are triggered, such as feeling disregarded or disrespected, let go of the resentment and work through for self.  Client has a new roommate and that is working out well.  Client is reaching out to friends and engaging in healthy positive distraction activities.  Has connected with ex-wife and that interaction went well.  Continues to have triggers of sadness about the loss and that is healthy for client.  Is crying at times but not too often.  His dog has been a healthy distraction and he is walking more.  Would like to increase his exercise and watch weight better and will continue to work on this.  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  Client is doing well and having less sadness and engaging with support system, activities that he enjoys and starting to date again.  Client is procrastinating more and having some difficulty with sleep.  He was given a sleep hygiene tip sleep to try a few of these things to improve his sleep and start tackling the list of things that he has procrastinated.  He will get a chalk board which has helped him get to the things he wants to get done and connect to the feeling he get's when accomplishing and following through with the list. Work on increasing his exercise.  Client is dating and is excited about a new woman in his life.  He is feeling really good about the relationship and we processed any feelings he was having due to it being so quickly after his divorce.  We processed feelings and client has started to journal more his thoughts and feelings.  Mood has improved and he is feeling good about where his life is at " currently.  Winter is coming up and at times his depression increases during this season.  Client is wanting to continue counseling until through the winter and as he works through the dynamics of this new relationship.  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 Client is very happy with his new relationship and this has improved his mood.  He feels good about this and is in the romantic stage of the relationship.  We processed how this has impacted the work he has done in regard to the loss of his past relationship and he stated that he dos not think it has impacted him much. Client is engaging in healthy activities, looking forward to his [employer] position that will be coming up.  Reinforced the positive steps he has taken to feel better about self and effectively dealing with the loss of his relationship to date. Current session: Client is very busy with his [employer] work and it is stressful for him.  Client engages in perfectionistic traits and we worked on lessening this and thinking more realistically and positively about his performance and abilities.  Discussed anti-anxiety strategies to use and practice to build up confidence and reduce his anxiety.  Client has scheduled self care at a cabin with his girlfriend and a trip to [place] in January.         Episode of Care Goals: Achieved / completed to satisfaction - ACTION (Actively working towards change); Intervened by reinforcing change plan / affirming steps taken     Current / Ongoing Stressors and Concerns:              relationship stress, little interest in doing things, anger regulation issues, financial stress      Treatment Objective(s) Addressed in This Session:   use thought-stopping strategy daily to reduce intrusive thoughts  Worked on treatment plan goals in session  Journal thoughts and feelings, strengths and accomplishments each day and what brings him edward and happiness each day,  exercise, weight loss, Processed grief and loss  stages, concentrated on continuing positive and healthy activities in life, connect with support system and positive distraction activities. Engage in positive and healthy communication skills, apply self compassion to core hurts. Decrease procrastination and improve sleep hygiene; self care relaxation and meditation practices and anti-anxiety strategies   Intervention:   Thought stopping process, effective communication skills    Journaling, distraction activities, self care activities  Anger regulation, concentrating on positive affirmations and strengths, apply self compassion to core hurts, exercise, weight loss  Grief and loss of marriage, CBT skills, connect with support system, positive self care activities, self compassion, positive communication and assertiveness skills  ASSESSMENT: Current Emotional / Mental Status (status of significant symptoms):   Risk status (Self / Other harm or suicidal ideation)   Client denies current fears or concerns for personal safety.   Client denies current or recent suicidal ideation or behaviors.   Client denies current or recent homicidal ideation or behaviors.   Client denies current or recent self injurious behavior or ideation.   Client denies other safety concerns.   A safety and risk management plan has not been developed at this time, however client was given the after-hours number / 911 should there be a change in any of these risk factors.     Appearance:   Appropriate    Eye Contact:   Good    Psychomotor Behavior: Normal    Attitude:   Cooperative    Orientation:   All   Speech    Rate / Production: Normal     Volume:  Normal    Mood:    Anxious    Affect:    Subdued  Worrisome    Thought Content:  Referential Thinking  Rumination    Thought Form:  Blocking  Circumstantial   Insight:    Fair      Medication Review:   No changes to current psychiatric medication(s)     Medication Compliance:   Yes     Changes in Health Issues:   None reported     Chemical Use  "Review:   Substance Use: Chemical use reviewed, no active concerns identified      Tobacco Use: No current tobacco use.       Collateral Reports Completed:   Not Applicable    PLAN: (Client Tasks / Therapist Tasks / Other) Past sessions: Worked on tx plan in session today.  Client will engage in thought stopping process to develop awareness about when he starts to feel anger or irritability. Work on positive communication skills with wife, engage in positive communication sessions with her weekly and use \"I\" statements.  Continue to use mindfulness HOANG when client needs to regulate his mood. Scheduled additional counseling sessions. Client will start documenting and journaling feelings and thoughts in a journal and concentrate on strengths and 3 positives or things that he accomplishes each day and what bring him edward.  Client having difficulty in his relationship and continuing to share with his wife how he feels and thinks about issues in his marriage.  Continue to engage in activities that distract him form negative feelings and thoughts. Concentrate on good self care activities that improve his mood.Client is communicating better with his partner, they have scheduled couple's counseling.  Client is on a weight loss program and is feeling better about this and is also working on regulating his anger better and concentrating on positives about self and things that he accomplishes and journaling when he needs too. Client is having more difficulty with relationship issues, they are trying to find a marriage counselor they both can relate too.  They continue to look into this.  We discussed asking for what his wife wants.  Continuing to be open to her awareness about an issue, don't get caught up in who is right or wrong.  Bring down defenses, communicate about everyday things and continue practicing positive relationship skills.  Client to be open about feelings, thought, needs and wants.  Not to get caught up in " "giving in to make the peace because that can lead to resentment. Client working on exercise, getting together with friends, eating better and sleeping well.  Concentrating on positive affirmations and strengths. Client given assignment to try with wife \"going down memory lesia\"  Concentrate on positive aspects of the relationship.  Client was walking more, engaging with friends more and engaging in activities, had some anxiety one evening and not certain what creates this.  Will think about possibly journaling what triggers his anxiety in the future.  Concentrate on applying self compassion to core hurts that are triggered, ask spouse what she needs and wants from him, listen more and will not try and get stuck in powerlessness and more inner power to deal with issues.  Continue couple's counseling, think about getting a dog, and apply self compassion to core hurts and ask wife she wants from client.  Client to continue with exercise, positive activities that improve his mood, continued couple's therapy and engaging in positive communication strategies with wife.  Continue positive affirmations and concentrating on strengths daily. Client and wife decided to separate and most likely move towards a divorce.  This has been difficult for both of them but they have been amicable to each other.  We processed and discussed stages of loss, continue to work on positives in his life like weight loss, exercise, getting a dog, concentrating on positives and strengths in life.  Client talked a little about his relationship with his sister and possibly repairing this in the future. Relationship has been strained for a couple of years. Client is doing well dealing with the decision to end his marriage.  Client is hoping to rescue a dog, continue with exercise, engage in CBT skills if needed, continue positive self care skills and reach out to support system if needed. Reviewed stages of grief and loss, client also is engaging in " "activities with friends that lift his mood. Client went to finalize his divorce with his wife.  Mixed feelings about this that we processed in session.  Discussed the stages of loss and where he was at with this. Client go a rescue dog from a shelter and really looking forward to having a pet again.  Concentrated on affirmations and strengths and engaging in positive self care activities such as playing and walking his dog, exercise, continue weight mgmt goal, reach out to friends and schedule activities which creates accountability for client to follow through with things.  Client has finalized his divorce.  Is connecting with family and support system.  We processed stages of loss and client is working through the stages.  Client got a dog which is helping him exercise more and the dog has been a positive emotional support to client.  Client has positive activities to look forward to over the summer, has anew roommate moving in the middle of June.  Ex-wife will be moving out then.  Client will continue to remain active, be aware of any depression symptoms that may pop up and continue to concentrate on positive healing activities to help him through the loss. Client has had some up's and down's in regard to how he is feeling about his divorce, some sadness but not to much of it.  Is sorting out all the issues related to ending his marriage.  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  All of these activities are difficult for client to engage in some really positive self care for self.  Uncertain about having a roommate move in so soon and may think about a later date for this to happen.  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  Apply self compassion to core hurts that are triggered, such as feeling " disregarded or disrespected, let go of the resentment and work through for self.  Client has a new roommate and that is working out well.  Client is reaching out to friends and engaging in healthy positive distraction activities.  Has connected with ex-wife and that interaction went well.  Continues to have triggers of sadness about the loss and that is healthy for client.  Is crying at times but not too often.  His dog has been a healthy distraction and he is walking more.  Would like to increase his exercise and watch weight better and will continue to work on this.  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 Client is doing well and having less sadness and engaging with support system, activities that he enjoys and starting to date again.  Client is procrastinating more and having some difficulty with sleep.  He was given a sleep hygiene tip sleep to try a few of these things to improve his sleep and start tackling the list of things that he has procrastinated.  He will get a chalk board which has helped him get to the things he wants to get done and connect to the feeling he get's when accomplishing and following through with the list. Work on increasing his exercise. Client is dating and is excited about a new woman in his life.  He is feeling really good about the relationship and we processed any feelings he was having due to it being so quickly after his divorce.  We processed feelings and client has started to journal more his thoughts and feelings.  Mood has improved and he is feeling good about where his life is at currently.  Winter is coming up and at times his depression increases during this season.  Client is wanting to continue counseling until through the winter and as he works through the dynamics of this new relationship.  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 Client is very  happy with his new relationship and this has improved his mood.  He feels good about this and is in the romantic stage of the relationship.  We processed how this has impacted the work he has done in regard to the loss of his past relationship and he stated that he dos not think it has impacted him much. Client is engaging in healthy activities, looking forward to his [employer] position that will be coming up.  Reinforced the positive steps he has taken to feel better about self and effectively dealing with the loss of his relationship to date. 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 Current session: Client is very busy with his [employer] work and it is stressful for him.  Client engages in perfectionistic traits and we worked on lessening this and thinking more realistically and positively about his performance and abilities.  Discussed anti-anxiety strategies to use and practice to build up confidence and reduce his anxiety.  Client has scheduled self care at a cabin with his girlfriend and a trip to [place] in January.                                        Hermilo Borja [facility]                                                         ________________________________________________________________________    Treatment Plan    Client's Name: Torres Cuevas  YOB: 1980    Date: 1-16-18    DSM-V Diagnoses:  296.31 (F33.0) Major Depressive Disorder, Recurrent Episode, Mild _ and With anxious distress  Psychosocial & Contextual Factors: relationship stress, little interest in doing things, anger regulation issues, financial stress    WHODAS: Completed first session    Referral / Collaboration:  Referral to another professional/service is not indicated at this time..    Anticipated number of session or this episode of care: 15      MeasurableTreatment Goal(s) related to diagnosis / functional  impairment(s)  Goal 1: Client will decrease symptoms of depression and anxiety and return to normal daily functioning    I will know I've met my goal when I feel better about myself and my relationship and have less anger in my life.      Objective #A (Client Action)    Client will use cognitive strategies identified in therapy to challenge anxious thoughts and depressive thoughts.  Status: Continued - Date(s):1-16-18, 7-17-18, 10-15-18     Intervention(s)  Therapist will teach thought stopping process and CBT skills to regulate his mood.  Client will practice daily until it becomes automatic to regulate his mood better. .    Objective #B  Client will improve his overall self esteem and mood.  Status: Continued - Date(s): 1-16-18, 7-17-18, 10-15-18    Intervention(s)  Therapist will assign homework Complete self esteem schedule and work on areas that need improvement.  Client will concentrate on engaging in activities that improve his self esteem and write down what he accomplishes each day to improve his self esteem. .    Objective #C  Client will learn effective ways to regulate his anger..  Status: Completed - Date: 10-15-18     Intervention(s)  Therapist will assign homework Client will Learn the HEALS technique to regulate his anger daily or as needed. .        Client has reviewed and agreed to the above plan.      Hermilo Borja, [facility]  January 16, 2018

## 2018-12-07 NOTE — MR AVS SNAPSHOT
MRN:[id number]                      After Visit Summary   12/7/2018    Torres Cuevas    MRN: [id number]           Visit Information        Provider Department      12/7/2018 9:00 AM Hermilo Borja LICSW [facility] Generic      Your next 10 appointments already scheduled     Dec 13, 2018  2:30 PM CST   Telephone Visit with Darrin Napier MD   [facility] ([facility])    [address] [id number] [phone]           Note: this is not an onsite visit; there is no need to come to the facility.            Jan 16, 2019  4:00 PM CST   Return Visit with TORY Canchola   [facility] ([facility])    [address] [id number] [phone]              Care EveryWhere ID     This is your Care EveryWhere ID. This could be used by other organizations to access your [place] medical records  [id number]        Equal Access to Services     RAMESH MANLEY : Hadii jeison ku hadasho Soomaali, waaxda luqadaha, qaybta kaalmada adeegyada, waxay jeffrey corea. So [facility] [phone].    ATENCIÓN: Si habla español, tiene a camacho disposición servicios gratuitos de asistencia lingüística. Llame al [phone].    We comply with applicable federal civil rights laws and [place] laws. We do not discriminate on the basis of race, color, national origin, age, disability, sex, sexual orientation, or gender identity.

## 2018-12-08 ASSESSMENT — ANXIETY QUESTIONNAIRES: GAD7 TOTAL SCORE: 3

## 2018-12-11 NOTE — PROCEDURES
"HOME SLEEP STUDY INTERPRETATION    Patient: Torres Cuevas  MRN: [id number]  YOB: 1980  Study Date: 12/3/2018  Referring Provider: [facility], [facility]  Ordering Provider: Darrin Napier MD     Indications for Home Study: Torres Cuevas is a 38 year old male who presents with symptoms suggestive of obstructive sleep apnea.    Estimated body mass index is 38.59 kg/m  as calculated from the following:    Height as of 11/27/18: 1.772 m (5' 9.75\").    Weight as of 11/27/18: 121.1 kg (267 lb).  Total score - [place]: 12 (11/27/2018 10:00 AM)  StopBang Total Score: 5 (11/27/2018 11:00 AM)    Data: A full night home sleep study was performed recording the standard physiologic parameters including body position, movement, sound, nasal pressure, thermal oral airflow, chest and abdominal movements with respiratory inductance plethysmography, and oxygen saturation by pulse oximetry. Pulse rate was estimated by oximetry recording. This study was considered adequate based on > 4 hours of quality oximetry and respiratory recording. As specified by the AASM Manual for the Scoring of Sleep and Associated events, version 2.3, Rule VIII.D 1B, 4% oxygen desaturation scoring for hypopneas is used as a standard of care on all home sleep apnea testing.    Analysis Time:  321 minutes    Respiration:   Sleep Associated Hypoxemia: sustained hypoxemia was not present. Baseline oxygen saturation was 95%.  Time with saturation less than or equal to 88% was 0 minutes. The lowest oxygen saturation was 87%.   Snoring: Snoring was present.  Respiratory events: The home study revealed a presence of 25 obstructive apneas and 0 mixed and central apneas. There were 80 hypopneas resulting in a combined apnea/hypopnea index [AHI] of 10.1 events per hour.  AHI was 19.9 per hour supine, n/a per hour prone, 3.0 per hour on left side, and 8.2 per hour on right side.   Pattern: Excluding events noted above, respiratory rate and " pattern was Normal.    Position: Percent of time spent: supine - 29%, prone - 0%, on left - 25%, on right - 45%.    Heart Rate: By pulse oximetry normal rate was noted.     Assessment:   Mild obstructive sleep apnea.  Sleep associated hypoxemia was not present.    Recommendations:  Consider auto-CPAP at 5-18 cmH2O, oral appliance therapy, positional therapy or surgical options.  Suggest optimizing sleep hygiene and avoiding sleep deprivation.  Weight management.    Diagnosis Code(s): Obstructive Sleep Apnea G47.33, Hypoxemia G47.36    Darrin Napier MD, December 11, 2018   Diplomate, American Board of Internal Medicine, Sleep Medicine

## 2018-12-13 ENCOUNTER — VIRTUAL VISIT (OUTPATIENT)
Dept: SLEEP MEDICINE | Facility: CLINIC | Age: 38
End: 2018-12-13
Payer: COMMERCIAL

## 2018-12-13 DIAGNOSIS — G47.33 OSA (OBSTRUCTIVE SLEEP APNEA): Primary | Chronic | ICD-10-CM

## 2018-12-13 PROCEDURE — 98967 PH1 ASSMT&MGMT NQHP 11-20: CPT | Performed by: INTERNAL MEDICINE

## 2018-12-13 NOTE — PROGRESS NOTES
"Home Sleep Apnea Testing Results Visit:    Chief Complaint   Patient presents with     Study Results     HST results        He presented with loud snoring, possible apneas, excessive daytime sleepiness (ESS 12), occasional morning headaches, occasional reflux at night, elevated blood pressure, obesity.      Estimated body mass index is 38.59 kg/m  as calculated from the following:    Height as of 11/27/18: 1.772 m (5' 9.75\").    Weight as of 11/27/18: 121.1 kg (267 lb).  Total score - [place]: 12 (11/27/2018 10:00 AM)   StopBang Total Score: 5 (11/27/2018 11:00 AM)    Home Sleep Apnea Testing - 12/3/18: 267#: AHI 10.1/hr. Supine AHI 19.9/hr.   Oxygen Lester of 87%.  Baseline 95%.  Sp02 =< 88% for 0 minutes  He slept on his back (29%), prone (0%), left (26) and right (45%) sides.   Analysis time: 621 minutes.     Signal quality of Oxymeter 99% Good  Nasal Cannula 100% Good  RIP belts 100% Good.     Torres Cuevas reports that he slept Poor .     Home Sleep Apnea Testing was reviewed in detail today with Torres and a copy emily be sent to him for his records.    Past medical/surgical history, family history, social history, medications and allergies were reviewed.        Impression/Plan:  Mild Obstructive Sleep Apnea.   Sleep associated hypoxemia was not present.     Treatment options discussed today including  auto-CPAP at 5-18 cmH2O, oral appliance therapy, positional therapy or surgical options.    Elected treatment with auto-CPAP at 5-18 cmH2O.    15 minutes spent with patient          "

## 2018-12-13 NOTE — PROGRESS NOTES
"Torres Cuevas is a 38 year old male who is being evaluated via a billable telephone visit.      The patient has been notified of following:     \"This telephone visit will be conducted via a call between you and your physician/provider. We have found that certain health care needs can be provided without the need for a physical exam.  This service lets us provide the care you need with a short phone conversation.  If a prescription is necessary we can send it directly to your pharmacy.  If lab work is needed we can place an order for that and you can then stop by our lab to have the test done at a later time.    If during the course of the call the physician/provider feels a telephone visit is not appropriate, you will not be charged for this service.\"     Consent has been obtained for this service by 1 care team member: yes. See the scanned image in the medical record.    "

## 2018-12-18 ENCOUNTER — TELEPHONE (OUTPATIENT)
Dept: SLEEP MEDICINE | Facility: CLINIC | Age: 38
End: 2018-12-18

## 2018-12-18 DIAGNOSIS — G47.33 OSA (OBSTRUCTIVE SLEEP APNEA): ICD-10-CM

## 2018-12-18 DIAGNOSIS — E66.01 MORBID OBESITY (H): ICD-10-CM

## 2018-12-18 NOTE — TELEPHONE ENCOUNTER
Called patient to see if he would like to schedule his CPAP setup with [facility]. Left voicemail for patient to call [facility] customer service at [phone].

## 2018-12-19 ENCOUNTER — DOCUMENTATION ONLY (OUTPATIENT)
Dept: SLEEP MEDICINE | Facility: CLINIC | Age: 38
End: 2018-12-19

## 2018-12-19 DIAGNOSIS — G47.33 OSA (OBSTRUCTIVE SLEEP APNEA): ICD-10-CM

## 2018-12-19 DIAGNOSIS — E66.01 MORBID OBESITY (H): ICD-10-CM

## 2018-12-19 NOTE — PROGRESS NOTES
[facility] was offered [facility]  Patient Torres Cuevas was set up at [facility] on December 19, 2018. Patient received a Resmed AirSense 10 Auto. Pressures were set at 5-18 cm H2O.   Patient s ramp is 5 cm H2O for Auto and FLEX/EPR is EPR, 2.  Patient received a Resmed Mask name: AIRFIT P10  Pillow mask size Medium, heated tubing and heated humidifier.  Patient is enrolled in the STM Program and does need to meet compliance. Patient has a follow up on TBD with Dr. Napier.    Muriel Chavez

## 2018-12-26 ENCOUNTER — DOCUMENTATION ONLY (OUTPATIENT)
Dept: SLEEP MEDICINE | Facility: CLINIC | Age: 38
End: 2018-12-26

## 2018-12-26 DIAGNOSIS — G47.33 OSA (OBSTRUCTIVE SLEEP APNEA): ICD-10-CM

## 2018-12-26 DIAGNOSIS — E66.01 MORBID OBESITY (H): ICD-10-CM

## 2018-12-26 NOTE — PROGRESS NOTES
3 DAY STM VISIT    Diagnostic AHI:     HST AHI (Non-PAT): 10.1        Patient contacted for 3 day STM visit  Message left for patient to return call     Device type:    PAP Device: Auto-CPAP ()     PAP settings from order::   5  cm  H20       CPAP max 18     Mask type:      Mask Interface: Nasal Mask      Device settings from machine      Min CPAP   5.0 (The minimum allowable pressure in cmH2O)               Max CPAP    18.0 (The maximum allowable pressure in cmH2O)           Assessment: Nightly usage over four hours.  Action plan: Pt to have f/u 14 day [facility] visit.  Patient has a follow up visit scheduled:   no, but is required by insurance for compliance.

## 2019-01-03 ENCOUNTER — DOCUMENTATION ONLY (OUTPATIENT)
Dept: SLEEP MEDICINE | Facility: CLINIC | Age: 39
End: 2019-01-03

## 2019-01-03 DIAGNOSIS — G47.33 OSA (OBSTRUCTIVE SLEEP APNEA): ICD-10-CM

## 2019-01-03 DIAGNOSIS — E66.01 MORBID OBESITY (H): ICD-10-CM

## 2019-01-03 NOTE — PROGRESS NOTES
14 DAY STM VISIT    Diagnostic AHI:    HST AHI (Non-PAT): 10.1      Message left for patient to return call     Mask type: Nasal Mask      Assessment: Pt meeting objective benchmarks.       Action plan: waiting for patient to return call.  and pt to have 30 day STM visit.      Device type:  Auto-CPAP             Objective measure goal  Compliance   Goal >70%  Leak   Goal < 10% of night in large leak/ 24 lpm    AHI  Goal < 5  Usage  Goal >240

## 2019-01-21 ENCOUNTER — DOCUMENTATION ONLY (OUTPATIENT)
Dept: SLEEP MEDICINE | Facility: CLINIC | Age: 39
End: 2019-01-21

## 2019-01-21 DIAGNOSIS — G47.33 OSA (OBSTRUCTIVE SLEEP APNEA): ICD-10-CM

## 2019-01-21 DIAGNOSIS — E66.01 MORBID OBESITY (H): ICD-10-CM

## 2019-01-21 NOTE — PROGRESS NOTES
30 DAY STM VISIT    Diagnostic AHI:   10.1 HST    Message left for patient to return call     Assessment: Pt meeting objective benchmarks.     Action plan: waiting for patient to return call.   Patient has not scheduled a follow up visit with Dr. Napier, however this is required by insurance.   Device type: Auto-CPAP  PAP settings: CPAP min 5.0 cm  H20     CPAP max 18.0 cm  H20        95th% pressure 11.4 cm  H20   Mask type:  Nasal Mask  Objective measures: 14 day rolling measures      Compliance  78 %      Leak  14.31 lpm  last  upload      AHI 2.02   last  upload      Average number of minutes 415      Objective measure goal  Compliance   Goal >70%  Leak   Goal < 24 lpm  AHI  Goal < 5  Usage  Goal >240

## 2019-02-21 ENCOUNTER — OFFICE VISIT (OUTPATIENT)
Dept: SLEEP MEDICINE | Facility: CLINIC | Age: 39
End: 2019-02-21
Payer: COMMERCIAL

## 2019-02-21 VITALS
WEIGHT: 281 LBS | OXYGEN SATURATION: 96 % | HEART RATE: 80 BPM | DIASTOLIC BLOOD PRESSURE: 93 MMHG | BODY MASS INDEX: 40.23 KG/M2 | SYSTOLIC BLOOD PRESSURE: 147 MMHG | HEIGHT: 70 IN

## 2019-02-21 DIAGNOSIS — E66.01 MORBID OBESITY (H): ICD-10-CM

## 2019-02-21 DIAGNOSIS — G47.33 OSA (OBSTRUCTIVE SLEEP APNEA): Chronic | ICD-10-CM

## 2019-02-21 DIAGNOSIS — G47.33 OSA (OBSTRUCTIVE SLEEP APNEA): Primary | ICD-10-CM

## 2019-02-21 PROCEDURE — 99213 OFFICE O/P EST LOW 20 MIN: CPT | Performed by: INTERNAL MEDICINE

## 2019-02-21 ASSESSMENT — MIFFLIN-ST. JEOR: SCORE: 2206.89

## 2019-02-21 NOTE — PATIENT INSTRUCTIONS
Your BMI is Body mass index is 40.61 kg/m .  Weight management is a personal decision.  If you are interested in exploring weight loss strategies, the following discussion covers the approaches that may be successful. Body mass index (BMI) is one way to tell whether you are at a healthy weight, overweight, or obese. It measures your weight in relation to your height.  A BMI of 18.5 to 24.9 is in the healthy range. A person with a BMI of 25 to 29.9 is considered overweight, and someone with a BMI of 30 or greater is considered obese. More than two-thirds of American adults are considered overweight or obese.  Being overweight or obese increases the risk for further weight gain. Excess weight may lead to heart disease and diabetes.  Creating and following plans for healthy eating and physical activity may help you improve your health.  Weight control is part of healthy lifestyle and includes exercise, emotional health, and healthy eating habits. Careful eating habits lifelong are the mainstay of weight control. Though there are significant health benefits from weight loss, long-term weight loss with diet alone may be very difficult to achieve- studies show long-term success with dietary management in less than 10% of people. Attaining a healthy weight may be especially difficult to achieve in those with severe obesity. In some cases, medications, devices and surgical management might be considered.  What can you do?  If you are overweight or obese and are interested in methods for weight loss, you should discuss this with your provider.     Consider reducing daily calorie intake by 500 calories.     Keep a food journal.     Avoiding skipping meals, consider cutting portions instead.    Diet combined with exercise helps maintain muscle while optimizing fat loss. Strength training is particularly important for building and maintaining muscle mass. Exercise helps reduce stress, increase energy, and improves fitness.  Increasing exercise without diet control, however, may not burn enough calories to loose weight.       Start walking three days a week 10-20 minutes at a time    Work towards walking thirty minutes five days a week     Eventually, increase the speed of your walking for 1-2 minutes at time    In addition, we recommend that you review healthy lifestyles and methods for weight loss available through the National Institutes of Health patient information sites:  http://win.niddk.nih.gov/publications/index.htm    And look into health and wellness programs that may be available through your health insurance provider, employer, local community center, or [employer]lin club.    Weight management plan: Patient was referred to their PCP to discuss a diet and exercise plan.

## 2019-02-21 NOTE — PROGRESS NOTES
Obstructive Sleep Apnea - PAP Follow-Up Visit:    Chief Complaint   Patient presents with     CPAP Follow Up       Torres Cuevas comes in today for follow-up of their mild sleep apnea, managed with CPAP.     He presented with loud snoring, possible apneas, excessive daytime sleepiness (ESS 12), occasional morning headaches, occasional reflux at night, elevated blood pressure, obesity.       Home Sleep Apnea Testing - 12/3/18: 267#: AHI 10.1/hr. Supine AHI 19.9/hr.   Oxygen Lester of 87%.  Baseline 95%.  Sp02 =< 88% for 0 minutes  He slept on his back (29%), prone (0%), left (26) and right (45%) sides.   Analysis time: 621 minutes.      Elected treatment with auto-CPAP at 5-18 cmH2O.    Overall, he rates the experience with PAP as 9 (0 poor, 10 great). The mask is comfortable.    The mask is leaking at his sometimes the weight of the tubing will pull on the mask.  The mask is leaking 2 nights per week.  He is not snoring with the mask on. He is having gasp arousals.  He is not having significant oral/nasal dryness. The pressure is comfortable.     His PAP interface is Nasal Pillows.    Bedtime is typically 2330. Usually it takes about 10 min minutes to fall asleep with the mask on. Wake time is typically 0600.  Patient is using PAP therapy 7 hours per night. The patient is usually getting 7 hours of sleep per night.    He does feel rested in the morning.    Total score - [place]: 3 (2/21/2019 10:00 AM)  MARION Total Score: 1      ResMed   Auto-PAP 5.0 - 18.0 cmH2O 30 day usage data:    93% of days with > 4 hours of use. 2/30 days with no use.   Average use 394 minutes per day.   95%ile Leak 9.77 L/min.   CPAP 95% pressure 11.4 cm.   AHI 0.75 events per hour.     Patient not checking BP on own.  In clinic  BP Readings from Last 6 Encounters:   02/21/19 (!) 147/93   11/27/18 (!) 151/106   11/05/18 144/80   02/06/17 120/84   01/03/17 130/82   12/06/16 125/84     No results for input(s): NA, POTASSIUM, CHLORIDE, CO2,  "ANIONGAP, GLC, BUN, CR, ORALIA in the last 68480 hours.         Past medical/surgical history, family history, social history, medications and allergies were reviewed.      Problem List:  Patient Active Problem List    Diagnosis Date Noted     CHRISTOPH (obstructive sleep apnea)- mild (AHI 10) 12/13/2018     Priority: Medium     Home Sleep Apnea Testing - 12/3/18: 267#: AHI 10.1/hr. Supine AHI 19.9/hr.   Oxygen Lester of 87%.  Baseline 95%.  Sp02 =< 88% for 0 minutes  He slept on his back (29%), prone (0%), left (26) and right (45%) sides.        Morbid obesity (H) 11/05/2018     Priority: Medium     Major depressive disorder, recurrent episode, mild with anxious distress (H) 01/16/2018     Priority: Medium     Seasonal allergies      Priority: Medium        BP (!) 151/97   Pulse 80   Ht 1.772 m (5' 9.75\")   Wt 127.5 kg (281 lb)   SpO2 96%   BMI 40.61 kg/m      Impression/Plan:     Mild Sleep apnea.  Tolerating PAP well. Daytime symptoms are improved.   - Narrowed pressures 8-14 cmH20    Torres to follow up with Primary Care provider regarding elevated blood pressure.     Torres Cuevas will follow up in about 2 year(s).     Fifteen minutes spent with patient, all of which were spent face-to-face counseling, consulting, coordinating plan of care.      "

## 2019-02-21 NOTE — NURSING NOTE
"Chief Complaint   Patient presents with     CPAP Follow Up       Initial BP (!) 151/97   Pulse 80   Ht 1.772 m (5' 9.75\")   Wt 127.5 kg (281 lb)   SpO2 96%   BMI 40.61 kg/m   Estimated body mass index is 40.61 kg/m  as calculated from the following:    Height as of this encounter: 1.772 m (5' 9.75\").    Weight as of this encounter: 127.5 kg (281 lb).    Medication Reconciliation: complete      "

## 2019-06-18 ENCOUNTER — DOCUMENTATION ONLY (OUTPATIENT)
Dept: SLEEP MEDICINE | Facility: CLINIC | Age: 39
End: 2019-06-18

## 2019-06-18 DIAGNOSIS — E66.01 MORBID OBESITY (H): ICD-10-CM

## 2019-06-18 DIAGNOSIS — G47.33 OSA (OBSTRUCTIVE SLEEP APNEA): ICD-10-CM

## 2019-09-05 ENCOUNTER — OFFICE VISIT (OUTPATIENT)
Dept: INTERNAL MEDICINE | Facility: CLINIC | Age: 39
End: 2019-09-05
Payer: COMMERCIAL

## 2019-09-05 VITALS
HEART RATE: 75 BPM | WEIGHT: 278 LBS | OXYGEN SATURATION: 97 % | SYSTOLIC BLOOD PRESSURE: 147 MMHG | RESPIRATION RATE: 16 BRPM | DIASTOLIC BLOOD PRESSURE: 100 MMHG | BODY MASS INDEX: 40.18 KG/M2

## 2019-09-05 DIAGNOSIS — R63.4 WEIGHT LOSS: ICD-10-CM

## 2019-09-05 DIAGNOSIS — E66.812 CLASS 2 OBESITY WITHOUT SERIOUS COMORBIDITY IN ADULT, UNSPECIFIED BMI, UNSPECIFIED OBESITY TYPE: ICD-10-CM

## 2019-09-05 DIAGNOSIS — Z76.89 ENCOUNTER TO ESTABLISH CARE WITH NEW DOCTOR: Primary | ICD-10-CM

## 2019-09-05 DIAGNOSIS — I10 ESSENTIAL HYPERTENSION: ICD-10-CM

## 2019-09-05 DIAGNOSIS — J30.2 SEASONAL ALLERGIC RHINITIS, UNSPECIFIED TRIGGER: ICD-10-CM

## 2019-09-05 RX ORDER — FLUTICASONE PROPIONATE 50 MCG
1 SPRAY, SUSPENSION (ML) NASAL 2 TIMES DAILY PRN
Qty: 11.1 ML | Refills: 3 | Status: SHIPPED | OUTPATIENT
Start: 2019-09-05 | End: 2023-01-10

## 2019-09-05 ASSESSMENT — PAIN SCALES - GENERAL: PAINLEVEL: NO PAIN (0)

## 2019-09-05 ASSESSMENT — PATIENT HEALTH QUESTIONNAIRE - PHQ9: SUM OF ALL RESPONSES TO PHQ QUESTIONS 1-9: 0

## 2019-09-05 NOTE — NURSING NOTE
Chief Complaint   Patient presents with     Establish Care     Kristina Benz LPN at 8:16 AM on 9/5/2019.

## 2019-09-05 NOTE — PROGRESS NOTES
[facility]ARE [facility]         HPI:       Torres Cuevas is a 39 year old male with hx of sleep apnea and depression presenting to establish care    Him and girlfriend making some changes and wanted to talk with a dietician. Eating better, become vegetarians, started to jog. Notes he wants to get his hank and BP under better control. Feels like BP has been higher over the last year. Does not have a home device. No headaches.     Notes he was a vegetarian for a few years and was at lowest weight. Gained weight with eating more meat and unhealthy at highest weight. Has been doing this for a month.     Notes depression in the past previously on medication. Feels well controlled and mood great without concerns.     Has sinus issues usually at the end of summer.    Denies SOB, CP,  abd pain, nausea, weakness, changes in sensation, fatigue.     Past medical  - CHRISTOPH  - Depression  - Sinus infection (netti pot and flonase)    Surgical hx  none    Family  - Dad with CAD and stenting  - Ai vargas with MI    Meds:  - allegra  - flonase     Social  Lives with GF. 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 One dog  Never smoker  Cut back on alcohol, 3-4 beers a week           Review of Systems:     ROS  I have personally reviewed and updated the complete ROS on the day of the visit.           Physical Exam:   BP (!) 147/100   Pulse 75   Resp 16   SpO2 97%   Body mass index is 40.18 kg/m .  Vitals were reviewed       GENERAL APPEARANCE: healthy, alert and no distress     EYES: EOMI,  PERRL     HENT: mouth without ulcers or lesions     NECK: no adenopathy, no asymmetry, masses     RESP: lungs clear to auscultation - no rales, rhonchi or wheezes     CV: regular rates and rhythm,  no murmur, click or rub     ABDOMEN:  soft, nontender, no HSM or masses and bowel sounds normal     MS: extremities normal- no gross deformities noted, no evidence of inflammation in joints, FROM in all extremities.     SKIN: no suspicious lesions or  rashes     NEURO: Normal strength and tone, sensory exam grossly normal, mentation intact and speech normal     PSYCH: mentation appears normal. and affect normal      Results:   Reviewed     Assessment and Plan     Torres Cuevas is a 39 year old male with hx of sleep apnea and depression presenting to establish care    Weight loss  Obesity  Pt working on significant lifestyle modifications including increased exercise with walking/jogging and healthy eating with vegetarian diet. Goal to lose weight and help with sleep apnea and elevated BP. Would like to work with nutritionist to help with healthy eating habits.   -     NUTRITION REFERRAL      HTN  Elevated BP today and on several visits prior. Pt notes increase correlating with weight gain. Discussed recommendation to start treatment but would be reasonable to continue working on weight loss and monitor with close follow up. Pt planning to keep BP log, advised to reach out to clinic sooner if BP persistently >150s/100s and consider sooner medication intervention, otherwise will plan to f/u in 3-6 months to reevaluate BP    Seasonal allergic rhinitis, unspecified trigger  -     fluticasone (FLONASE) 50 MCG/ACT nasal spray; Spray 1 spray into both nostrils 2 times daily as needed for rhinitis or allergies        Options for treatment and follow-up care were reviewed with the patient. Torres Cuevas engaged in the decision making process and verbalized understanding of the options discussed and agreed with the final plan.    Edith Em MD  Sep 5, 2019    Pt was seen and plan of care discussed with Dr Fong    Should follow up in 3-6 months for reevaluation of BP and weight loss     Teaching Physician Note:  I was present during the visit and the patient was seen and examined by me.   I discussed the case with the resident and agree with the note as documented by the resident with the following exceptions:  None.    Ashley Fong M.D.  Internal Medicine    pager [phone]

## 2019-09-05 NOTE — PATIENT INSTRUCTIONS
[facility]         Internal Medicine Resident                   Continuity Clinic    Who We Are    Resident Continuity Clinic is a part of the [facility] Primary Care [facility].  Resident physicians see patients independently and establish a relationship with them over the course of their three-year residency program.  As with the Primary Care Clinic, our Resident Continuity Clinic models a group practice.  If your doctor is not available, you will be able to see another resident physician.  At the end of a resident s training, patients will be transitioned to a new resident physician for ongoing care.     We treat patients with a wide array of medical needs from routine physicals, to acute illnesses, to diabetes and blood pressure management, to complex medical illness.  What is a Resident Physician?    Resident physicians hold medical degrees and are doctors. They are training to become specialists in Internal Medicine. They work under the supervision of board-certified faculty physicians.  Expectations for Your Care    We strive to provide accessible, quality care at all times.    In order to provide this care, it is best to see your primary care resident doctor consistently rather switching between providers.  In the event you do see another physician, you should schedule a follow-up visit with your usual primary care doctor.    If you are transitioning your care from another clinic, it is helpful to have your records available for your doctor to review.    We do not prescribe controlled substances, such as ADD medications or narcotic pain medications, on your first visit.  We will review your health records and concerns prior to devising a treatment plan with you in order to provide the best care.      Clinic Services     Extended clinic hours; patient  to help navigate your visit;  parking; laboratory and imaging services with evening and weekend hours    Multiple medical and  surgical specialties in one building    Complementary services, including Nutrition, Integrative Medicine, Pharmacy consultations, Mental and Behavioral Health, Sports Medicine and Physical Therapy    Thank You    We would like to thank you for choosing the [facility] Internal [facility] Resident Continuity Clinic for your primary care. You are making a priceless contribution to the training of the next generation of health care practitioners.     Contact us at [phone] for appointments or questions.    Resident Clinic Hours are Tuesdays and Thursdays, 7:30am-5:00pm    Residents   Bar Jo MD  (Male)   Jose Desouza MD   (Male)   Nuvia Traylor MD  (Female)  Nadya Pacheco MD   (Female)   Edith Em MD   (Female)    Christianne Kim MD    (Female)   Hermilo Castillo MD  (Male)   Vinod Paris MD  (Male)    Nai Felix MD  (Female)   Adilia Garsia MD  (Female)   Hellen Flores MD    (Female)   Jerardo Wall MD  (Male)   Anirudh Castorena MD  (Male)   Gab Chopra MD  (Male)   JOHNSON Lynch MD   (Male)   Stephen Scott MD  (Male)    Leigh Lemos MD (Female)   Chavez Preciado MD  (Male)   Annie Jay MD  (Male)   Andrei Sylvesetr MD  (Male)   Sadie Sunshine MD    (Female)   Ashlyn Arellano MD  (Female)     Supervising Physicians   MD Emery Nicolas MD Jill Bowman Peterson, MD Briar Duffy, MD James Langland, MD Mary Logeais, MD Tanya Melnik, MD Charles Moldow, MD Heather Thompson Buum, MD          Primary Care Center Medication Refill Request Information:  * Please contact your pharmacy regarding ANY request for medication refills.  ** [facility] Prescription Fax = [phone]  * Please allow 3 business days for routine medication refills.  * Please allow 5 business days for controlled substance medication refills.     Primary Care Center Test Result notification information:  *You will be notified with in 7-10 days of your appointment day regarding the results  of your test.  If you are on MyChart you will be notified as soon as the provider has reviewed the results and signed off on them.    [facility]: [phone]

## 2019-11-07 ENCOUNTER — OFFICE VISIT (OUTPATIENT)
Dept: NUTRITION | Facility: CLINIC | Age: 39
End: 2019-11-07
Payer: COMMERCIAL

## 2019-11-07 ENCOUNTER — HEALTH MAINTENANCE LETTER (OUTPATIENT)
Age: 39
End: 2019-11-07

## 2019-11-07 VITALS
WEIGHT: 270.2 LBS | BODY MASS INDEX: 39.05 KG/M2 | DIASTOLIC BLOOD PRESSURE: 105 MMHG | SYSTOLIC BLOOD PRESSURE: 158 MMHG

## 2019-11-07 DIAGNOSIS — E66.01 MORBID OBESITY (H): Primary | ICD-10-CM

## 2019-11-07 PROCEDURE — 97802 MEDICAL NUTRITION INDIV IN: CPT

## 2019-11-07 NOTE — PROGRESS NOTES
Medical Nutrition Therapy  Visit Type:Initial assessment and intervention    Torres Cuevas presents today for MNT and education related to weight management and vegetarian eating pattern.   He is accompanied by self.     ASSESSMENT:   Patient comments/concerns relating to nutrition: has been working on eating healthier, had been pescaterian for a number of years, resumed eating meat a few years ago and now in the last couple of months has resumed mostly vegetarian with occasional fish, but had been snacking much more often. Stopped eating refined/added sugar in October, finds clothes fitting better, feeling better; average weight was 215 during pescaterian eating phase. Also has had high BP and would like to try diet/exercise to treat prior to starting medication. Requests BP check in clinic today. At home BP was 142/100 this morning. Feeling good about the eating pattern changes he's made. Likes to fast overnight from 8 pm - 11 am and feels this is working for him.     NUTRITION HISTORY:   Making nutrition changes since September; avoiding refined/added sugar since October 1; May skip breakfast, but often 3 meals/day; has been meal prepping; not usually hungry in the morning    Breakfast: skips 3-4 days/week; bagel and cream cheese; weekends - Brunch at noon or 1 pm  Snack: almonds  Lunch: 11 am - squash soup with kale, cauliflower, vegetable broth OR black bean burger + pirate booty 1 oz bag OR veggie bowl from [employer] + sparkling water, water or coffee  Snack: buttered toast OR toast with natural PB OR cottage cheese  Dinner: Green   - corn and zucchini flatbread OR japanese pancakes with rice, roasted broccoli and cashews + occasionally 1-2 beer or wine  Snacks: popcorn OR cottage cheese OR berries  Beverages: Alcohol 0-2/day, Coffee, Water throughout the day and sparkling water 2/day    Misses meals? Often breakfast  Eats out:  1-2 meals/per week     Previous diet education:  No     Food  allergies/intolerances: none reported    Diet is high in: none  Diet is low in: calcium and fruits    EXERCISE: trained for 5K in August/September, ran 5K on 10/12/19. Has been walking dog 30 minutes a few days/week. Just purchased Loylap machine and plans to begin using regularly.     SOCIO/ECONOMIC:   Lives with: fiance    MEDICATIONS:  Current Outpatient Medications   Medication     DiphenhydrAMINE HCl (BENADRYL ALLERGY PO)     Fexofenadine HCl (ALLEGRA PO)     fluticasone (FLONASE) 50 MCG/ACT nasal spray     fluticasone (FLONASE) 50 MCG/ACT spray     order for DME     No current facility-administered medications for this visit.        LABS:  Last Basic Metabolic Panel:  No results found for: NA   No results found for: POTASSIUM  No results found for: CHLORIDE  No results found for: ORALIA  No results found for: CO2  No results found for: BUN  No results found for: CR  No results found for: GLC    ANTHROPOMETRICS:  Vitals: Wt 122.6 kg (270 lb 3.2 oz)   BMI 39.05 kg/m    Body mass index is 39.05 kg/m .      Wt Readings from Last 5 Encounters:   11/07/19 122.6 kg (270 lb 3.2 oz)   09/05/19 126.1 kg (278 lb)   02/21/19 127.5 kg (281 lb)   11/27/18 121.1 kg (267 lb)   11/05/18 124.3 kg (274 lb)       Weight Change: down 8 lbs in 2 months    ESTIMATED KCAL REQUIREMENTS:  2804 kcal per day    NUTRITION DIAGNOSIS: Obesity related to prior excessive calorie intake as evidenced by BMI 39.05    NUTRITION INTERVENTION:  Nutrition Prescription: DASH eating pattern for hypertension + continued reduced calorie intake ([id number] kcal/day appropriate)  Education given to support: general nutrition guidelines, weight reduction, consistent meals, sodium, exercise, behavior modification, portion control and heart healthy diet    PATIENT'S BEHAVIOR CHANGE GOALS:   See Patient Instructions for patient stated behavior change goals. AVS was printed and given to patient at today's appointment.    MONITOR / EVALUATE:  RD will  monitor/evaluate:  Beliefs and attitudes related to food  Food and nutrition knowledge / skills  Food / Beverage / Nutrient intake   Pertinent Labs  Progress toward meeting stated nutrition-related goals  Readiness to change nutrition-related behaviors  Weight change    FOLLOW-UP:  Follow up with RD as needed.  MyChart RD with questions/concerns.     Rowan Ho, MPH, RD, LD, CDE   Time spent in minutes: 45  Encounter: Individual

## 2019-11-07 NOTE — PATIENT INSTRUCTIONS
For blood pressure management:    - Potassium rich foods - fruits and vegetables - 5-9 servings / day (2.5-4.5 cups fruits / veggies a day)     - Calcium - aim for 1000 mg per day    - Reduce sodium intake - keep to 2300 mg or less    Send [employer] message with questions or schedule follow-up as needed

## 2020-03-25 ENCOUNTER — VIRTUAL VISIT (OUTPATIENT)
Dept: PSYCHOLOGY | Facility: CLINIC | Age: 40
End: 2020-03-25
Payer: COMMERCIAL

## 2020-03-25 DIAGNOSIS — F33.0 MAJOR DEPRESSIVE DISORDER, RECURRENT EPISODE, MILD WITH ANXIOUS DISTRESS (H): Primary | ICD-10-CM

## 2020-03-25 PROCEDURE — 90834 PSYTX W PT 45 MINUTES: CPT | Mod: TEL | Performed by: SOCIAL WORKER

## 2020-03-25 NOTE — PROGRESS NOTES
"                                               Progress Note    Client Name: Torres Cuevas  Date: 3-25-20         Service Type: Individual      Session Start Time: 6:00  Session End Time: 6:45      Session Length: 45     Session #: 19     Attendees: Client    Treatment Plan Last Reviewed: Started tx plan today 1-16-18, 7-17-18, 10-15-18, 30-26-20  PHQ-9 / KANDY-7 : Complete next session      DATA      Progress Since Last Session (Related to Symptoms / Goals / Homework):   Symptoms: Worsening anxiety and some anger and irritability    Homework: Did not completePrevious sessions: Worked on tx plan in session today.  Client will engage in thought stopping process to develop awareness about when he starts to feel anger or irritability. Work on positive communication skills with wife, engage in positive communication sessions with her weekly and use \"I\" statements.  Continue to use mindfulness HOANG when client needs to regulate his mood. Client will start documenting and journaling feelings and thoughts in a journal and concentrate on strengths and 3 positives or things that he accomplishes each day and what bring him edward.  Client having difficulty in his relationship and continuing to share with his wife how he feels and thinks about issues in his marriage.  Continue to engage in activities that distract him form negative feelings and thoughts.Client is communicating better with his partner, they have scheduled couple's counseling.  Client is on a weight loss program and is feeling better about this and is also working on regulating his anger better and concentrating on positives about self and things that he accomplishes and journaling when he needs too. Client is having more difficulty with relationship issues, they are trying to find a marriage counselor they both can relate too.  They continue to look into this.  We discussed asking for what his wife wants.  Continuing to be open to her awareness about an issue, don't " "get caught up in who is right or wrong.  Bring down defenses, communicate about everyday things and continue practicing positive relationship skills.  Client to be open about feelings, thought, needs and wants.  Not to get caught up in giving in to make the peace because that can lead to resentment. Client working on exercise, getting together with friends, eating better and sleeping well.  Concentrating on positive affirmations and strengths.  Client given assignment to try with wife \"going down memory lesia\"  Concentrate on positive aspects of the relationship. Client was walking more, engaging with friends more and engaging in activities, had some anxiety one evening and not certain what creates this.  Will think about possibly journaling what triggers his anxiety in the future.  Concentrate on applying self compassion to core hurts that are triggered, ask spouse what she needs and wants from him, listen more and will not try and get stuck in powerlessness and more inner power to deal with issues.  Continue couple's counseling, think about getting a dog, and apply self compassion to core hurts and ask wife she wants from client.  Client to continue with exercise, positive activities that improve his mood, continued couple's therapy and engaging in positive communication strategies with wife.  Continue positive affirmations and concentrating on strengths daily. Client and wife decided to separate and most likely move towards a divorce.  This has been difficult for both of them but they have been amicable to each other.  We processed and discussed stages of loss, continue to work on positives in his life like weight loss, exercise, getting a dog, concentrating on positives and strengths in life.  Client talked a little about his relationship with his sister and possibly repairing this in the future. Relationship has been strained for a couple of years.  Client is doing well dealing with the decision to end his " "marriage.  Client is hoping to rescue a dog, continue with exercise, engage in CBT skills if needed, continue positive self care skills and reach out to support system if needed. Client went to finalize his divorce with his wife.  Mixed feelings about this that we processed in session.  Discussed the stages of loss and where he was at with this. Client go a rescue dog from a shelter and really looking forward to having a pet again.  Concentrated on affirmations and strengths and engaging in positive self care activities such as playing and walking his dog, exercise, continue weight mgmt goal, reach out to friends and schedule activities which creates accountability for client to follow through with things. Client has finalized his divorce.  Is connecting with family and support system.  We processed stages of loss and client is working through the stages.  Client got a dog which is helping him exercise more and the dog has been a positive emotional support to client.  Client has positive activities to look forward to over the summer, has anew roommate moving in the middle of June.  Ex-wife will be moving out then.  Client will continue to remain active, be aware of any depression symptoms that may pop up and continue to concentrate on positive healing activities to help him through the loss. Client has had some up's and down's in regard to how he is feeling about his divorce, some sadness but not to much of it.  Is sorting out all the issues related to ending his marriage.  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  All of these activities are difficult for client to engage in some really positive self care for self.  Uncertain about having a roommate move in so soon and may think about a later date for this to happen.  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  Apply self compassion to core hurts that are triggered, such as feeling disregarded or disrespected, let go of the resentment and work through for self.  Client has a new roommate and that is working out well.  Client is reaching out to friends and engaging in healthy positive distraction activities.  Has connected with ex-wife and that interaction went well.  Continues to have triggers of sadness about the loss and that is healthy for client.  Is crying at times but not too often.  His dog has been a healthy distraction and he is walking more.  Would like to increase his exercise and watch weight better and will continue to work on this.  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  Client is doing well and having less sadness and engaging with support system, activities that he enjoys and starting to date again.  Client is procrastinating more and having some difficulty with sleep.  He was given a sleep hygiene tip sleep to try a few of these things to improve his sleep and start tackling the list of things that he has procrastinated.  He will get a chalk board which has helped him get to the things he wants to get done and connect to the feeling he get's when accomplishing and following through with the list. Work on increasing his exercise.  Client is dating and is excited about a new woman in his life.  He is feeling really good about the relationship and we processed any feelings he was having due to it being so quickly after his divorce.  We processed feelings and client has started to journal more his thoughts and feelings.  Mood has improved and he is feeling good about where his life is at currently.  Winter is coming up and at times his depression increases during this season.  Client is wanting to continue counseling until through the winter and  as he works through the dynamics of this new relationship.  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 Client is very happy with his new relationship and this has improved his mood.  He feels good about this and is in the romantic stage of the relationship.  We processed how this has impacted the work he has done in regard to the loss of his past relationship and he stated that he dos not think it has impacted him much. Client is engaging in healthy activities, looking forward to his [employer] position that will be coming up.  Reinforced the positive steps he has taken to feel better about self and effectively dealing with the loss of his relationship to date.  Client is very busy with his [employer] work and it is stressful for him.  Client engages in perfectionistic traits and we worked on lessening this and thinking more realistically and positively about his performance and abilities.  Discussed anti-anxiety strategies to use and practice to build up confidence and reduce his anxiety.  Client has scheduled self care at a cabin with his girlfriend and a trip to [place] in January.Current session: Client called to check-in with therapist on what was going on and having some issues in his new relationship with irritability and anger which he wanted to talk about and get support and strategies to help him with this.            Episode of Care Goals: No improvement - RELAPSE (Returned to unhealthy behavior); Intervened by reassessing readiness to change and identifying appropriate stage.  Identified reasons for relapse, successes, and change talk     Current / Ongoing Stressors and Concerns:              relationship stress, little interest in doing things, anger regulation issues, financial stress      Treatment Objective(s) Addressed in This Session:   use thought-stopping strategy daily to reduce intrusive thoughts  Worked on treatment plan goals in session  Journal thoughts and feelings, strengths and  accomplishments each day and what brings him edward and happiness each day,  exercise, weight loss, Processed grief and loss stages, concentrated on continuing positive and healthy activities in life, connect with support system and positive distraction activities. Engage in positive and healthy communication skills, apply self compassion to core hurts. Decrease procrastination and improve sleep hygiene; self care relaxation and meditation practices and anti-anxiety strategies   Intervention:   Thought stopping process, effective communication skills    Journaling, distraction activities, self care activities  Anger regulation, concentrating on positive affirmations and strengths, apply self compassion to core hurts, exercise, weight loss  Grief and loss of marriage, CBT skills, connect with support system, positive self care activities, self compassion, positive communication and assertiveness skills  ASSESSMENT: Current Emotional / Mental Status (status of significant symptoms):   Risk status (Self / Other harm or suicidal ideation)   Client denies current fears or concerns for personal safety.   Client denies current or recent suicidal ideation or behaviors.   Client denies current or recent homicidal ideation or behaviors.   Client denies current or recent self injurious behavior or ideation.   Client denies other safety concerns.   A safety and risk management plan has not been developed at this time, however client was given the after-hours number / 911 should there be a change in any of these risk factors.     Appearance:   Appropriate    Eye Contact:   Good    Psychomotor Behavior: Normal    Attitude:   Cooperative    Orientation:   All   Speech    Rate / Production: Normal     Volume:  Normal    Mood:    Anxious    Affect:    Subdued  Worrisome    Thought Content:  Referential Thinking  Rumination    Thought Form:  Blocking  Circumstantial   Insight:    Fair      Medication Review:   No changes to current  "psychiatric medication(s)     Medication Compliance:   Yes     Changes in Health Issues:   None reported     Chemical Use Review:   Substance Use: Chemical use reviewed, no active concerns identified      Tobacco Use: No current tobacco use.       Collateral Reports Completed:   Not Applicable    PLAN: (Client Tasks / Therapist Tasks / Other) Past sessions: Worked on tx plan in session today.  Client will engage in thought stopping process to develop awareness about when he starts to feel anger or irritability. Work on positive communication skills with wife, engage in positive communication sessions with her weekly and use \"I\" statements.  Continue to use mindfulness HOANG when client needs to regulate his mood. Scheduled additional counseling sessions. Client will start documenting and journaling feelings and thoughts in a journal and concentrate on strengths and 3 positives or things that he accomplishes each day and what bring him edward.  Client having difficulty in his relationship and continuing to share with his wife how he feels and thinks about issues in his marriage.  Continue to engage in activities that distract him form negative feelings and thoughts. Concentrate on good self care activities that improve his mood.Client is communicating better with his partner, they have scheduled couple's counseling.  Client is on a weight loss program and is feeling better about this and is also working on regulating his anger better and concentrating on positives about self and things that he accomplishes and journaling when he needs too. Client is having more difficulty with relationship issues, they are trying to find a marriage counselor they both can relate too.  They continue to look into this.  We discussed asking for what his wife wants.  Continuing to be open to her awareness about an issue, don't get caught up in who is right or wrong.  Bring down defenses, communicate about everyday things and continue " "practicing positive relationship skills.  Client to be open about feelings, thought, needs and wants.  Not to get caught up in giving in to make the peace because that can lead to resentment. Client working on exercise, getting together with friends, eating better and sleeping well.  Concentrating on positive affirmations and strengths. Client given assignment to try with wife \"going down memory lesia\"  Concentrate on positive aspects of the relationship.  Client was walking more, engaging with friends more and engaging in activities, had some anxiety one evening and not certain what creates this.  Will think about possibly journaling what triggers his anxiety in the future.  Concentrate on applying self compassion to core hurts that are triggered, ask spouse what she needs and wants from him, listen more and will not try and get stuck in powerlessness and more inner power to deal with issues.  Continue couple's counseling, think about getting a dog, and apply self compassion to core hurts and ask wife she wants from client.  Client to continue with exercise, positive activities that improve his mood, continued couple's therapy and engaging in positive communication strategies with wife.  Continue positive affirmations and concentrating on strengths daily. Client and wife decided to separate and most likely move towards a divorce.  This has been difficult for both of them but they have been amicable to each other.  We processed and discussed stages of loss, continue to work on positives in his life like weight loss, exercise, getting a dog, concentrating on positives and strengths in life.  Client talked a little about his relationship with his sister and possibly repairing this in the future. Relationship has been strained for a couple of years. Client is doing well dealing with the decision to end his marriage.  Client is hoping to rescue a dog, continue with exercise, engage in CBT skills if needed, continue " "positive self care skills and reach out to support system if needed. Reviewed stages of grief and loss, client also is engaging in activities with friends that lift his mood. Client went to finalize his divorce with his wife.  Mixed feelings about this that we processed in session.  Discussed the stages of loss and where he was at with this. Client go a rescue dog from a shelter and really looking forward to having a pet again.  Concentrated on affirmations and strengths and engaging in positive self care activities such as playing and walking his dog, exercise, continue weight mgmt goal, reach out to friends and schedule activities which creates accountability for client to follow through with things.  Client has finalized his divorce.  Is connecting with family and support system.  We processed stages of loss and client is working through the stages.  Client got a dog which is helping him exercise more and the dog has been a positive emotional support to client.  Client has positive activities to look forward to over the summer, has anew roommate moving in the middle of June.  Ex-wife will be moving out then.  Client will continue to remain active, be aware of any depression symptoms that may pop up and continue to concentrate on positive healing activities to help him through the loss. Client has had some up's and down's in regard to how he is feeling about his divorce, some sadness but not to much of it.  Is sorting out all the issues related to ending his marriage.  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  All of these activities are difficult for client to engage in some really positive self care for self.  Uncertain about having a roommate move in so soon and may think about a later date for this to happen.  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  Apply self compassion to core hurts that are triggered, such as feeling disregarded or disrespected, let go of the resentment and work through for self.  Client has a new roommate and that is working out well.  Client is reaching out to friends and engaging in healthy positive distraction activities.  Has connected with ex-wife and that interaction went well.  Continues to have triggers of sadness about the loss and that is healthy for client.  Is crying at times but not too often.  His dog has been a healthy distraction and he is walking more.  Would like to increase his exercise and watch weight better and will continue to work on this.  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 Client is doing well and having less sadness and engaging with support system, activities that he enjoys and starting to date again.  Client is procrastinating more and having some difficulty with sleep.  He was given a sleep hygiene tip sleep to try a few of these things to improve his sleep and start tackling the list of things that he has procrastinated.  He will get a chalk board which has helped him get to the things he wants to get done and connect to the feeling he get's when accomplishing and following through with the list. Work on increasing his exercise. Client is dating and is excited about a new woman in his life.  He is feeling really good about the relationship and we processed any feelings he was having due to it being so quickly after his divorce.  We processed feelings and client has started to journal more his thoughts and feelings.  Mood has improved and he is feeling good about where his life is at currently.  Winter is coming up and at times his depression increases during this season.  Client is wanting to continue counseling until through the winter and as  he works through the dynamics of this new relationship.  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 Client is very happy with his new relationship and this has improved his mood.  He feels good about this and is in the romantic stage of the relationship.  We processed how this has impacted the work he has done in regard to the loss of his past relationship and he stated that he dos not think it has impacted him much. Client is engaging in healthy activities, looking forward to his [employer] position that will be coming up.  Reinforced the positive steps he has taken to feel better about self and effectively dealing with the loss of his relationship to date. 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 Client is very busy with his [employer] work and it is stressful for him.  Client engages in perfectionistic traits and we worked on lessening this and thinking more realistically and positively about his performance and abilities.  Discussed anti-anxiety strategies to use and practice to build up confidence and reduce his anxiety.  Client has scheduled self care at a cabin with his girlfriend and a trip to [place] in January. Current session: Client wanted to check in on his anger and irritability that flared up again and he had his first disagreement with his fiance. We went through the HEALS technique, use thought stopping process.  Applying self compassion to core hurts that are triggered.  Continue to walk away and not engage in conversation when angry and use appropriate communication skills when he is regulated again..                                        Hermilo Borja, LICSW                                                         ________________________________________________________________________    Treatment Plan    Client's Name: Torres Cuevas  YOB: 1980    Date:  1-16-18    DSM-V Diagnoses:  296.31 (F33.0) Major Depressive Disorder, Recurrent Episode, Mild _ and With anxious distress  Psychosocial & Contextual Factors: relationship stress, little interest in doing things, anger regulation issues, financial stress    WHODAS: Completed first session    Referral / Collaboration:  Referral to another professional/service is not indicated at this time..    Anticipated number of session or this episode of care: 15      MeasurableTreatment Goal(s) related to diagnosis / functional impairment(s)  Goal 1: Client will decrease symptoms of depression and anxiety and return to normal daily functioning    I will know I've met my goal when I feel better about myself and my relationship and have less anger in my life.      Objective #A (Client Action)    Client will use cognitive strategies identified in therapy to challenge anxious thoughts and depressive thoughts.  Status: Continued - Date(s):1-16-18, 7-17-18, 10-15-18, 3-25-20     Intervention(s)  Therapist will teach thought stopping process and CBT skills to regulate his mood.  Client will practice daily until it becomes automatic to regulate his mood better. .    Objective #B  Client will improve his overall self esteem and mood.  Status: Completed - Date: 3-25-20     Intervention(s)  Therapist will assign homework Complete self esteem schedule and work on areas that need improvement.  Client will concentrate on engaging in activities that improve his self esteem and write down what he accomplishes each day to improve his self esteem. .    Objective #C  Client will learn effective ways to regulate his anger..  Status: Continued - Date(s):3-25-20     Intervention(s)  Therapist will assign homework Client will Learn the HEALS technique to regulate his anger daily or as needed. .        Client has reviewed and agreed to the above plan.      Hermilo Borja [facility]

## 2020-06-04 DIAGNOSIS — G47.33 OSA (OBSTRUCTIVE SLEEP APNEA): Primary | ICD-10-CM

## 2020-06-04 DIAGNOSIS — E66.01 MORBID OBESITY (H): ICD-10-CM

## 2020-12-06 ENCOUNTER — HEALTH MAINTENANCE LETTER (OUTPATIENT)
Age: 40
End: 2020-12-06

## 2021-01-07 ENCOUNTER — VIRTUAL VISIT (OUTPATIENT)
Dept: FAMILY MEDICINE | Facility: CLINIC | Age: 41
End: 2021-01-07
Payer: COMMERCIAL

## 2021-01-07 DIAGNOSIS — Z13.228 SCREENING FOR CYSTIC FIBROSIS: Primary | ICD-10-CM

## 2021-01-07 PROCEDURE — 99212 OFFICE O/P EST SF 10 MIN: CPT | Mod: 95 | Performed by: NURSE PRACTITIONER

## 2021-01-07 ASSESSMENT — PAIN SCALES - GENERAL: PAINLEVEL: NO PAIN (0)

## 2021-01-07 ASSESSMENT — PATIENT HEALTH QUESTIONNAIRE - PHQ9: SUM OF ALL RESPONSES TO PHQ QUESTIONS 1-9: 0

## 2021-01-07 NOTE — PATIENT INSTRUCTIONS

## 2021-01-07 NOTE — NURSING NOTE
Chief Complaint   Patient presents with     Genetic Counseling     Patient calls in to have lab work done for screening for cystic fibriosis.         Kayode Hernandez MA on 1/7/2021 at 11:55 AM  
- reports smoking roughly 5 cigarettes daily, offered nicotine patch and refused

## 2021-01-07 NOTE — PROGRESS NOTES
Torres is a 40 year old who is being evaluated via a billable video visit.      Video Start Time: 1232  Assessment & Plan     Screening for cystic fibrosis  - Cystic fibrosis mutation analysis      10 minutes spent on the date of the encounter doing chart review, history and exam, documentation and further activities as noted above       See Patient Instructions  Patient and wife has a genetic counselor and will share those results with him or her.  Results will be released to patient via Vine  Patient verbalized understanding & agreed with plan of care.    ELIZ Guerra [facility] [place]    Zheng Macdonald is a 40 year old who presents to clinic today for the following health issues  accompanied by his himself:    HPI   Patient requests cystic fibrosis test (CFTR) to find out if he is a carrier.  His wife is currently pregnant and is a carrier.  He states they have a genetic counselor they are working with and they will review the results as well.   Patient is okay with going over the genetic consent form online.  He declines any other questions or concerns today.     Review of Systems   Constitutional, HEENT, cardiovascular, pulmonary, gi and gu systems are negative, except as otherwise noted.      Objective    Vitals - Patient Reported  Pain Score: No Pain (0)        Physical Exam   GENERAL: Healthy, alert and no distress  EYES: Eyes grossly normal to inspection.  No discharge or erythema, or obvious scleral/conjunctival abnormalities.  RESP: No audible wheeze, cough, or visible cyanosis.  No visible retractions or increased work of breathing.    SKIN: Visible skin clear. No significant rash, abnormal pigmentation or lesions.  NEURO: Cranial nerves grossly intact.  Mentation and speech appropriate for age.  PSYCH: Mentation appears normal, affect normal/bright, judgement and insight intact, normal speech and appearance well-groomed.    No results found  for this or any previous visit (from the past 24 hour(s)).    I spent a total of 6 minutes face-to-face with Torres Cuevas during today's office visit.  Over 50% of this time was spent counseling the patient and/or coordinating care regarding plan of care.  A total of 10 minutes was spent in the video encounter with the patient, in chart review and charting.  See note for details.        Video-Visit Details    Type of service:  Video Visit    Video End Time:1238    Originating Location (pt. Location): Other work    Distant Location (provider location):  [facility] [place]     Platform used for Video Visit: DraftMix

## 2021-01-08 DIAGNOSIS — Z13.228 SCREENING FOR CYSTIC FIBROSIS: ICD-10-CM

## 2021-01-11 DIAGNOSIS — Z13.228 SCREENING FOR CYSTIC FIBROSIS: ICD-10-CM

## 2021-01-11 PROCEDURE — 36415 COLL VENOUS BLD VENIPUNCTURE: CPT | Performed by: PATHOLOGY

## 2021-01-11 PROCEDURE — 81220 CFTR GENE COM VARIANTS: CPT | Performed by: PATHOLOGY

## 2021-01-20 LAB — LAB SCANNED RESULT: NORMAL

## 2021-09-25 ENCOUNTER — HEALTH MAINTENANCE LETTER (OUTPATIENT)
Age: 41
End: 2021-09-25

## 2022-01-15 ENCOUNTER — HEALTH MAINTENANCE LETTER (OUTPATIENT)
Age: 42
End: 2022-01-15

## 2022-03-08 ENCOUNTER — LAB REQUISITION (OUTPATIENT)
Dept: LAB | Facility: CLINIC | Age: 42
End: 2022-03-08

## 2022-03-08 PROCEDURE — U0005 INFEC AGEN DETEC AMPLI PROBE: HCPCS | Performed by: INTERNAL MEDICINE

## 2022-03-09 LAB — SARS-COV-2 RNA RESP QL NAA+PROBE: NEGATIVE

## 2022-05-06 ENCOUNTER — LAB REQUISITION (OUTPATIENT)
Dept: LAB | Facility: CLINIC | Age: 42
End: 2022-05-06

## 2022-05-06 LAB — SARS-COV-2 RNA RESP QL NAA+PROBE: NEGATIVE

## 2022-05-06 PROCEDURE — U0003 INFECTIOUS AGENT DETECTION BY NUCLEIC ACID (DNA OR RNA); SEVERE ACUTE RESPIRATORY SYNDROME CORONAVIRUS 2 (SARS-COV-2) (CORONAVIRUS DISEASE [COVID-19]), AMPLIFIED PROBE TECHNIQUE, MAKING USE OF HIGH THROUGHPUT TECHNOLOGIES AS DESCRIBED BY CMS-2020-01-R: HCPCS | Performed by: INTERNAL MEDICINE

## 2022-05-10 ENCOUNTER — LAB REQUISITION (OUTPATIENT)
Dept: LAB | Facility: CLINIC | Age: 42
End: 2022-05-10

## 2022-05-10 LAB — SARS-COV-2 RNA RESP QL NAA+PROBE: NEGATIVE

## 2022-05-10 PROCEDURE — U0003 INFECTIOUS AGENT DETECTION BY NUCLEIC ACID (DNA OR RNA); SEVERE ACUTE RESPIRATORY SYNDROME CORONAVIRUS 2 (SARS-COV-2) (CORONAVIRUS DISEASE [COVID-19]), AMPLIFIED PROBE TECHNIQUE, MAKING USE OF HIGH THROUGHPUT TECHNOLOGIES AS DESCRIBED BY CMS-2020-01-R: HCPCS | Performed by: INTERNAL MEDICINE

## 2022-05-25 PROCEDURE — U0005 INFEC AGEN DETEC AMPLI PROBE: HCPCS | Performed by: INTERNAL MEDICINE

## 2022-05-26 ENCOUNTER — LAB REQUISITION (OUTPATIENT)
Dept: LAB | Facility: CLINIC | Age: 42
End: 2022-05-26

## 2022-05-27 LAB — SARS-COV-2 RNA RESP QL NAA+PROBE: NEGATIVE

## 2022-07-26 ENCOUNTER — ANCILLARY PROCEDURE (OUTPATIENT)
Dept: GENERAL RADIOLOGY | Facility: CLINIC | Age: 42
End: 2022-07-26
Attending: FAMILY MEDICINE
Payer: COMMERCIAL

## 2022-07-26 ENCOUNTER — OFFICE VISIT (OUTPATIENT)
Dept: ORTHOPEDICS | Facility: CLINIC | Age: 42
End: 2022-07-26
Payer: COMMERCIAL

## 2022-07-26 DIAGNOSIS — M65.4 DE QUERVAIN'S TENOSYNOVITIS, LEFT: ICD-10-CM

## 2022-07-26 DIAGNOSIS — M65.4 DE QUERVAIN'S TENOSYNOVITIS, LEFT: Primary | ICD-10-CM

## 2022-07-26 PROCEDURE — 73110 X-RAY EXAM OF WRIST: CPT | Mod: LT | Performed by: RADIOLOGY

## 2022-07-26 PROCEDURE — 99204 OFFICE O/P NEW MOD 45 MIN: CPT | Mod: GC | Performed by: FAMILY MEDICINE

## 2022-07-26 NOTE — PROGRESS NOTES
ASSESSMENT/PLAN:    (M65.4) De Quervain's tenosynovitis, left  (primary encounter diagnosis)  Comment: exam consistent w/ DeQuervain's; will trial voltaren gel and continue his splint; f/u in 2-3 wks; if no better, consider an injection  Plan: diclofenac (VOLTAREN) 1 % topical gel, XR Wrist Left G/E 3 Views          Attestation:  This patient has been seen and evaluated by me, Leonardo Mcallister MD with the resident, Dr Mccormick and the care team. I agree with the findings and plan of care as documented in this note.    Leonardo Mcallister MD  July 26, 2022  10:55 AM        Pt is a 42 year old male here today for:     Left Wrist/ Hand pain: Sharp stabbing pain, worse in the morning. 8/10 in severity  Location: Radial wrist   Duration: 1 month   Trauma/ Fall? None.  Fall in [facility] June 28. No pain until about a week later. A lot of repetitive motion at work, works as pharmacy tech.    Swelling? none   Numbness/ Tingling? none   Popping catching - occasional  Weakness? none   Imaging? none   Treatment? Thumb spica brace. Started last Friday, pain has improved.     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 of compounding as part of job so repetitive wrist/ hand movements as frequent part of job    Past Medical History:   Diagnosis Date     Seasonal allergic rhinitis       Past Surgical History:   Procedure Laterality Date     NO HISTORY OF SURGERY        Current Outpatient Medications   Medication Sig Dispense Refill     DiphenhydrAMINE HCl (BENADRYL ALLERGY PO) Take by mouth as needed        Fexofenadine HCl (ALLEGRA PO) Take 180 mg by mouth daily as needed for allergies       fluticasone (FLONASE) 50 MCG/ACT nasal spray Spray 1 spray into both nostrils 2 times daily as needed for rhinitis or allergies 11.1 mL 3     fluticasone (FLONASE) 50 MCG/ACT spray Spray 1-2 sprays into both nostrils daily 1 Bottle 11     order for DME autoCPAP 9-14 cmH20 1 Device 0      Allergies   Allergen Reactions     Seasonal Allergies       ROS:    Gen- no fevers/chills   Rheum - no morning stiffness   Derm - no rash/ redness   Neuro - no numbness, no tingling   Remainder of ROS negative.     Exam:   There were no vitals taken for this visit.       L WRIST/HAND:   Inspection: Swelling - mild over distal radius ; Atrophy - NO   Sensation: intact in median, radial, ulnar distribution   ROM:   Wrist: flexion- full/ extension- full/ pronation- full/ supination- full;   radial deviation - full; ulnar deviation- full   Hand: Full flexion at PIP/DIP, finger abduction/ adduction.   Strength: 5/5 in all motions   Bony tenderness:   Wrist: Carpal bones: No; Snuffbox: YES; Mild TTP at radial styloid and 1st CMC   Hand: Metacarpals: Neg; Phalanges: Neg   Tendons: FDS/FDP/Extensor mechanism intact   Maneuvers: -Finkelstein today though he states this would have caused pain before he started wearing his splint   Other: No nodules palpated in palmar aspect.     Xray of L wrist including scaphoid view on July 26, 2022 at [facility] location - films personally reviewed with patient at time of visit     My impression: Neg for fx

## 2022-07-26 NOTE — LETTER
7/26/2022      RE: Torres Cuevas  [address]  [place] [id number]     Dear Colleague,    Thank you for referring your patient, Torres Cuevas, to the [facility] [place]. Please see a copy of my visit note below.    ASSESSMENT/PLAN:    (M65.4) De Quervain's tenosynovitis, left  (primary encounter diagnosis)  Comment: exam consistent w/ DeQuervain's; will trial voltaren gel and continue his splint; f/u in 2-3 wks; if no better, consider an injection  Plan: diclofenac (VOLTAREN) 1 % topical gel, XR Wrist Left G/E 3 Views          Attestation:  This patient has been seen and evaluated by me, Leonardo Mcallister MD with the resident, Dr Mccormick and the care team. I agree with the findings and plan of care as documented in this note.    Leonardo Mcallister MD  July 26, 2022  10:55 AM        Pt is a 42 year old male here today for:     Left Wrist/ Hand pain: Sharp stabbing pain, worse in the morning. 8/10 in severity  Location: Radial wrist   Duration: 1 month   Trauma/ Fall? None.  Fall in [facility] June 28. No pain until about a week later. A lot of repetitive motion at work, works as pharmacy tech.    Swelling? none   Numbness/ Tingling? none   Popping catching - occasional  Weakness? none   Imaging? none   Treatment? Thumb spica brace. Started last Friday, pain has improved.     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 so repetitive wrist/ hand movements as frequent part of job    Past Medical History:   Diagnosis Date     Seasonal allergic rhinitis       Past Surgical History:   Procedure Laterality Date     NO HISTORY OF SURGERY        Current Outpatient Medications   Medication Sig Dispense Refill     DiphenhydrAMINE HCl (BENADRYL ALLERGY PO) Take by mouth as needed        Fexofenadine HCl (ALLEGRA PO) Take 180 mg by mouth daily as needed for allergies       fluticasone (FLONASE) 50 MCG/ACT nasal spray Spray 1 spray into both nostrils 2 times  daily as needed for rhinitis or allergies 11.1 mL 3     fluticasone (FLONASE) 50 MCG/ACT spray Spray 1-2 sprays into both nostrils daily 1 Bottle 11     order for DME autoCPAP 9-14 cmH20 1 Device 0      Allergies   Allergen Reactions     Seasonal Allergies       ROS:   Gen- no fevers/chills   Rheum - no morning stiffness   Derm - no rash/ redness   Neuro - no numbness, no tingling   Remainder of ROS negative.     Exam:   There were no vitals taken for this visit.       L WRIST/HAND:   Inspection: Swelling - mild over distal radius ; Atrophy - NO   Sensation: intact in median, radial, ulnar distribution   ROM:   Wrist: flexion- full/ extension- full/ pronation- full/ supination- full;   radial deviation - full; ulnar deviation- full   Hand: Full flexion at PIP/DIP, finger abduction/ adduction.   Strength: 5/5 in all motions   Bony tenderness:   Wrist: Carpal bones: No; Snuffbox: YES; Mild TTP at radial styloid and 1st CMC   Hand: Metacarpals: Neg; Phalanges: Neg   Tendons: FDS/FDP/Extensor mechanism intact   Maneuvers: -Finkelstein today though he states this would have caused pain before he started wearing his splint   Other: No nodules palpated in palmar aspect.     [place]ray of L wrist including scaphoid view on [place], 2022 [place] [facility] [place] - films personally reviewed with patient at time of visit     My impression: Neg for fx       Again, thank you for allowing me to participate in the care of your patient.      Sincerely,    Leonardo Mcallister MD

## 2022-08-22 ENCOUNTER — OFFICE VISIT (OUTPATIENT)
Dept: ORTHOPEDICS | Facility: CLINIC | Age: 42
End: 2022-08-22
Payer: COMMERCIAL

## 2022-08-22 DIAGNOSIS — M65.4 DE QUERVAIN'S TENOSYNOVITIS, LEFT: Primary | ICD-10-CM

## 2022-08-22 PROCEDURE — 99213 OFFICE O/P EST LOW 20 MIN: CPT | Mod: GC | Performed by: FAMILY MEDICINE

## 2022-08-22 NOTE — LETTER
8/22/2022      RE: Torres Cuevas  [address]  [place]"" [id number]     Dear Colleague,    Thank you for referring your patient, Torres Cuevas, to the [facility] [place]. Please see a copy of my visit note below.    ASSESSMENT/PLAN:    (M65.4) De Quervain's tenosynovitis, left  (primary encounter diagnosis)  Comment:   Plan: greatly improved; will cont voltaren gel and thumb spica prn    Attestation:  This patient has been seen and evaluated by me, Leonardo Mcallister MD with the resident, Dr Argueta and the care team. I agree with the findings and plan of care as documented in this note.    Leonardo Mcallister MD  August 22, 2022  8:07 AM      Pt is a 42 year old male last seen on 7/26/22 here for follow up of:     L wrist - doing considerable better  Using squeeze ball exercises  Has not needed brace x last week  Hurts in am      Per my last note:  (M65.4) De Quervain's tenosynovitis, left  (primary encounter diagnosis)  Comment: exam consistent w/ DeQuervain's; will trial voltaren gel and continue his splint; f/u in 2-3 wks; if no better, consider an injection  Plan: diclofenac (VOLTAREN) 1 % topical gel, XR Wrist Left G/E 3 Views    Past Medical History:   Diagnosis Date     Seasonal allergic rhinitis       Current Outpatient Medications   Medication Sig Dispense Refill     diclofenac (VOLTAREN) 1 % topical gel Apply 2 g topically 4 times daily To L radial wrist 100 g 1     DiphenhydrAMINE HCl (BENADRYL ALLERGY PO) Take by mouth as needed        Fexofenadine HCl (ALLEGRA PO) Take 180 mg by mouth daily as needed for allergies       fluticasone (FLONASE) 50 MCG/ACT nasal spray Spray 1 spray into both nostrils 2 times daily as needed for rhinitis or allergies 11.1 mL 3     fluticasone (FLONASE) 50 MCG/ACT spray Spray 1-2 sprays into both nostrils daily 1 Bottle 11     order for DME autoCPAP 9-14 cmH20 1 Device 0      Allergies   Allergen Reactions     Seasonal Allergies        ROS:   Gen- no fevers/chills   Rheum - no morning stiffness   Derm - no rash/ redness   Neuro - no numbness, no tingling   Remainder of ROS negative.     Exam:   There were no vitals taken for this visit.    L wrist  Mild TTP at radial styloid  No snuff box or CMC tenderness  Neg Finkelstein  +tenderness w/ resisted thumb extension        Again, thank you for allowing me to participate in the care of your patient.      Sincerely,    Leonardo Mcallister MD

## 2022-08-22 NOTE — PROGRESS NOTES
ASSESSMENT/PLAN:    (M65.4) De Quervain's tenosynovitis, left  (primary encounter diagnosis)  Comment:   Plan: greatly improved; will cont voltaren gel and thumb spica prn    Attestation:  This patient has been seen and evaluated by me, Leonardo Mcallister MD with the resident, Dr Argueta and the care team. I agree with the findings and plan of care as documented in this note.    Leonardo Mcallister MD  August 22, 2022  8:07 AM      Pt is a 42 year old male last seen on 7/26/22 here for follow up of:     L wrist - doing considerable better  Using squeeze ball exercises  Has not needed brace x last week  Hurts in am      Per my last note:  (M65.4) De Quervain's tenosynovitis, left  (primary encounter diagnosis)  Comment: exam consistent w/ DeQuervain's; will trial voltaren gel and continue his splint; f/u in 2-3 wks; if no better, consider an injection  Plan: diclofenac (VOLTAREN) 1 % topical gel, XR Wrist Left G/E 3 Views    Past Medical History:   Diagnosis Date     Seasonal allergic rhinitis       Current Outpatient Medications   Medication Sig Dispense Refill     diclofenac (VOLTAREN) 1 % topical gel Apply 2 g topically 4 times daily To L radial wrist 100 g 1     DiphenhydrAMINE HCl (BENADRYL ALLERGY PO) Take by mouth as needed        Fexofenadine HCl (ALLEGRA PO) Take 180 mg by mouth daily as needed for allergies       fluticasone (FLONASE) 50 MCG/ACT nasal spray Spray 1 spray into both nostrils 2 times daily as needed for rhinitis or allergies 11.1 mL 3     fluticasone (FLONASE) 50 MCG/ACT spray Spray 1-2 sprays into both nostrils daily 1 Bottle 11     order for DME autoCPAP 9-14 cmH20 1 Device 0      Allergies   Allergen Reactions     Seasonal Allergies       ROS:   Gen- no fevers/chills   Rheum - no morning stiffness   Derm - no rash/ redness   Neuro - no numbness, no tingling   Remainder of ROS negative.     Exam:   There were no vitals taken for this visit.    L wrist  Mild TTP at radial styloid  No snuff box or CMC  tenderness  Neg Finkelstein  +tenderness w/ resisted thumb extension

## 2022-12-26 ENCOUNTER — HEALTH MAINTENANCE LETTER (OUTPATIENT)
Age: 42
End: 2022-12-26

## 2023-01-09 ASSESSMENT — SLEEP AND FATIGUE QUESTIONNAIRES
HOW LIKELY ARE YOU TO NOD OFF OR FALL ASLEEP WHEN YOU ARE A PASSENGER IN A CAR FOR AN HOUR WITHOUT A BREAK: WOULD NEVER DOZE
HOW LIKELY ARE YOU TO NOD OFF OR FALL ASLEEP IN A CAR, WHILE STOPPED FOR A FEW MINUTES IN TRAFFIC: WOULD NEVER DOZE
HOW LIKELY ARE YOU TO NOD OFF OR FALL ASLEEP WHILE SITTING INACTIVE IN A PUBLIC PLACE: WOULD NEVER DOZE
HOW LIKELY ARE YOU TO NOD OFF OR FALL ASLEEP WHILE LYING DOWN TO REST IN THE AFTERNOON WHEN CIRCUMSTANCES PERMIT: SLIGHT CHANCE OF DOZING
HOW LIKELY ARE YOU TO NOD OFF OR FALL ASLEEP WHILE SITTING AND READING: SLIGHT CHANCE OF DOZING
HOW LIKELY ARE YOU TO NOD OFF OR FALL ASLEEP WHILE SITTING AND TALKING TO SOMEONE: WOULD NEVER DOZE
HOW LIKELY ARE YOU TO NOD OFF OR FALL ASLEEP WHILE SITTING QUIETLY AFTER LUNCH WITHOUT ALCOHOL: WOULD NEVER DOZE
HOW LIKELY ARE YOU TO NOD OFF OR FALL ASLEEP WHILE WATCHING TV: SLIGHT CHANCE OF DOZING

## 2023-01-10 ENCOUNTER — TELEPHONE (OUTPATIENT)
Dept: SLEEP MEDICINE | Facility: CLINIC | Age: 43
End: 2023-01-10

## 2023-01-10 ENCOUNTER — VIRTUAL VISIT (OUTPATIENT)
Dept: SLEEP MEDICINE | Facility: CLINIC | Age: 43
End: 2023-01-10
Payer: COMMERCIAL

## 2023-01-10 VITALS — BODY MASS INDEX: 39.25 KG/M2 | HEIGHT: 69 IN | WEIGHT: 265 LBS

## 2023-01-10 DIAGNOSIS — E66.01 MORBID OBESITY (H): Chronic | ICD-10-CM

## 2023-01-10 DIAGNOSIS — G47.33 OSA (OBSTRUCTIVE SLEEP APNEA): Primary | Chronic | ICD-10-CM

## 2023-01-10 PROBLEM — F33.0 MAJOR DEPRESSIVE DISORDER, RECURRENT EPISODE, MILD WITH ANXIOUS DISTRESS (H): Status: ACTIVE | Noted: 2018-01-16

## 2023-01-10 PROCEDURE — 99203 OFFICE O/P NEW LOW 30 MIN: CPT | Mod: 95 | Performed by: INTERNAL MEDICINE

## 2023-01-10 ASSESSMENT — PAIN SCALES - GENERAL: PAINLEVEL: NO PAIN (0)

## 2023-01-10 NOTE — PATIENT INSTRUCTIONS

## 2023-01-10 NOTE — PROGRESS NOTES
Torres is a 42 year old who is being evaluated via a billable video visit.      How would you like to obtain your AVS? MyChart  If the video visit is dropped, the invitation should be resent by: Send to e-mail at: [email or web address]  Will anyone else be joining your video visit? No    Allen Mendez      Video-Visit Details    Type of service:  Video Visit     Originating Location (pt. Location): Home    Distant Location (provider location):  Off-site  Platform used for Video Visit: [facility]             Sleep Apnea - Follow-up Visit:    Impression/Plan:     Mild Sleep apnea.   Tolerating PAP well. Daytime symptoms are improved.   - Continue current treatments.     Obesity  We discussed the link between obesity, sleep apnea   Weight stable since sleep study   - See patient instructions       Torres Cuevas will follow up in about 2 year(s).       I spent 5 minutes with patient including counseling, and 10 minutes with chart review, and documentation         History of Present Illness:  Chief Complaint   Patient presents with     Video Visit       Torres Cuevas presents for follow-up of their mild sleep apnea, managed with CPAP.     DME [facility]    He presented with loud snoring, possible apneas, excessive daytime sleepiness (ESS 12), occasional morning headaches, occasional reflux at night, elevated blood pressure, obesity.       Home Sleep Apnea Testing - 12/3/18: 267#: AHI 10.1/hr. Supine AHI 19.9/hr.   Oxygen Lester of 87%.  Baseline 95%.  Sp02 =< 88% for 0 minutes  He slept on his back (29%), prone (0%), left (26) and right (45%) sides.   Analysis time: 621 minutes.      Elected treatment with auto-CPAP at 5-18 cmH2O, narrowed to 8-14     Last clinic visit was 2/2019    Do you use a CPAP Machine at home: Yes  Overall, on a scale of 0-10 how would you rate your CPAP (0 poor, 10 great): 9    What type of mask do you use: Nasal Pillow  Is your mask comfortable: No  If not, why: Falls off. Too loose    Is your  mask leaking: Yes  If yes, where do you feel it: Nostril  How many night per week does the mask leak (0-7): 5     Occasionally has oral leaks     Do you notice snoring with mask on: Yes  Do you notice gasping arousals with mask on: No  Are you having significant oral or nasal dryness: No  Is the pressure setting comfortable: Yes    What is your typical bedtime: 11:30pm  How long does it take you to go to sleep on PAP therapy: 10 minutes  What time do you typically get out of bed for the day: 7:30am  How many hours on average per night are you using PAP therapy: 7-8  How many hours are you sleeping per night: 7-8  Do you feel well rested in the morning: Yes      [employer]   Auto-PAP 8.0 - 14.0 cmH2O 30 day usage data:    80% of days with > 4 hours of use. 3/30 days with no use.   Average use 7' 5 minutes per day.   95%ile Leak 1.8 L/min.   CPAP 95% pressure 11.9 cm.   AHI 0.91 events per hour.       EPWORTH SLEEPINESS SCALE      [place] Sleepiness Scale ( SHOAIB Herman  1990-1997[facility] [place]/English - Final version - 21 Nov 07 - [facility] [place].) 1/9/2023   Sitting and reading Slight chance of dozing   Watching TV Slight chance of dozing   Sitting, inactive in a public place (e.g. a theatre or a meeting) Would never doze   As a passenger in a car for an hour without a break Would never doze   Lying down to rest in the afternoon when circumstances permit Slight chance of dozing   Sitting and talking to someone Would never doze   Sitting quietly after a lunch without alcohol Would never doze   In a car, while stopped for a few minutes in traffic Would never doze   [place] Score (MC) 3   [place] Score (Sleep) 3       INSOMNIA SEVERITY INDEX (MARION)      Insomnia Severity Index (MARION) 1/9/2023   Difficulty falling asleep 0   Difficulty staying asleep 0   Problems waking up too early 0   How SATISFIED/DISSATISFIED are you with your CURRENT sleep pattern? 0   How NOTICEABLE to others do you think your sleep problem is in terms  "of impairing the quality of your life? 1   How WORRIED/DISTRESSED are you about your current sleep problem? 1   To what extent do you consider your sleep problem to INTERFERE with your daily functioning (e.g. daytime fatigue, mood, ability to function at work/daily chores, concentration, memory, mood, etc.) CURRENTLY? 0   MARION Total Score 2       Guidelines for Scoring/Interpretation:  Total score categories:  0-7 = No clinically significant insomnia   8-14 = Subthreshold insomnia   15-21 = Clinical insomnia (moderate severity)  22-28 = Clinical insomnia (severe)  Used via courtesy of www.[employer].va.gov with permission from Robert Leavitt PhD., [facility]        Past medical/surgical history, family history, social history, medications and allergies were reviewed.        Problem List:  Patient Active Problem List    Diagnosis Date Noted     CHRISTOPH (obstructive sleep apnea)- mild (AHI 10) 12/13/2018     Priority: Medium     Home Sleep Apnea Testing - 12/3/18: 267#: AHI 10.1/hr. Supine AHI 19.9/hr.   Oxygen Lester of 87%.  Baseline 95%.  Sp02 =< 88% for 0 minutes  He slept on his back (29%), prone (0%), left (26) and right (45%) sides.        Morbid obesity (H) 11/05/2018     Priority: Medium     Major depressive disorder, recurrent episode, mild with anxious distress (H) 01/16/2018     Priority: Medium     Seasonal allergies      Priority: Low        Ht 1.753 m (5' 9\")   Wt 120.2 kg (265 lb)   BMI 39.13 kg/m      "

## 2023-01-10 NOTE — TELEPHONE ENCOUNTER
CALLED PT TO SCHEDULE MASK FITTING FOR FF MASK IN [place] ON 1/16/23 @11AM    Minesh Berry, Respiratory DME Coordinator

## 2023-01-16 DIAGNOSIS — G47.14 SLEEP DISORDER DUE TO A GENERAL MEDICAL CONDITION, HYPERSOMNIA TYPE: ICD-10-CM

## 2023-01-16 DIAGNOSIS — E66.01 MORBID OBESITY (H): Chronic | ICD-10-CM

## 2023-01-16 DIAGNOSIS — G47.33 OSA (OBSTRUCTIVE SLEEP APNEA): Primary | Chronic | ICD-10-CM

## 2023-01-19 ENCOUNTER — OFFICE VISIT (OUTPATIENT)
Dept: FAMILY MEDICINE | Facility: CLINIC | Age: 43
End: 2023-01-19
Payer: COMMERCIAL

## 2023-01-19 VITALS
RESPIRATION RATE: 14 BRPM | HEART RATE: 87 BPM | OXYGEN SATURATION: 98 % | HEIGHT: 69 IN | TEMPERATURE: 98.7 F | BODY MASS INDEX: 39.9 KG/M2 | SYSTOLIC BLOOD PRESSURE: 144 MMHG | WEIGHT: 269.4 LBS | DIASTOLIC BLOOD PRESSURE: 88 MMHG

## 2023-01-19 DIAGNOSIS — Z13.220 SCREENING FOR HYPERLIPIDEMIA: ICD-10-CM

## 2023-01-19 DIAGNOSIS — R03.0 ELEVATED BLOOD PRESSURE READING WITHOUT DIAGNOSIS OF HYPERTENSION: ICD-10-CM

## 2023-01-19 DIAGNOSIS — Z11.4 SCREENING FOR HIV (HUMAN IMMUNODEFICIENCY VIRUS): ICD-10-CM

## 2023-01-19 DIAGNOSIS — Z00.00 ROUTINE GENERAL MEDICAL EXAMINATION AT A HEALTH CARE FACILITY: Primary | ICD-10-CM

## 2023-01-19 DIAGNOSIS — Z11.59 NEED FOR HEPATITIS C SCREENING TEST: ICD-10-CM

## 2023-01-19 DIAGNOSIS — Z23 NEED FOR VACCINATION: ICD-10-CM

## 2023-01-19 PROBLEM — F33.0 MAJOR DEPRESSIVE DISORDER, RECURRENT EPISODE, MILD WITH ANXIOUS DISTRESS (H): Status: RESOLVED | Noted: 2018-01-16 | Resolved: 2023-01-19

## 2023-01-19 PROCEDURE — 99396 PREV VISIT EST AGE 40-64: CPT | Mod: 25 | Performed by: STUDENT IN AN ORGANIZED HEALTH CARE EDUCATION/TRAINING PROGRAM

## 2023-01-19 PROCEDURE — 90471 IMMUNIZATION ADMIN: CPT | Performed by: STUDENT IN AN ORGANIZED HEALTH CARE EDUCATION/TRAINING PROGRAM

## 2023-01-19 PROCEDURE — 99213 OFFICE O/P EST LOW 20 MIN: CPT | Mod: 25 | Performed by: STUDENT IN AN ORGANIZED HEALTH CARE EDUCATION/TRAINING PROGRAM

## 2023-01-19 PROCEDURE — 90715 TDAP VACCINE 7 YRS/> IM: CPT | Performed by: STUDENT IN AN ORGANIZED HEALTH CARE EDUCATION/TRAINING PROGRAM

## 2023-01-19 ASSESSMENT — PATIENT HEALTH QUESTIONNAIRE - PHQ9
10. IF YOU CHECKED OFF ANY PROBLEMS, HOW DIFFICULT HAVE THESE PROBLEMS MADE IT FOR YOU TO DO YOUR WORK, TAKE CARE OF THINGS AT HOME, OR GET ALONG WITH OTHER PEOPLE: NOT DIFFICULT AT ALL
SUM OF ALL RESPONSES TO PHQ QUESTIONS 1-9: 1
SUM OF ALL RESPONSES TO PHQ QUESTIONS 1-9: 1

## 2023-01-19 ASSESSMENT — ENCOUNTER SYMPTOMS
HEMATOCHEZIA: 0
EYE PAIN: 0
PALPITATIONS: 0
SORE THROAT: 0
MYALGIAS: 0
HEARTBURN: 0
JOINT SWELLING: 0
HEMATURIA: 0
HEADACHES: 0
ABDOMINAL PAIN: 0
DIZZINESS: 0
DYSURIA: 0
NERVOUS/ANXIOUS: 0
FREQUENCY: 0
DIARRHEA: 0
CONSTIPATION: 0
WEAKNESS: 0
PARESTHESIAS: 0
SHORTNESS OF BREATH: 0
COUGH: 0
FEVER: 0
CHILLS: 0
ARTHRALGIAS: 0
NAUSEA: 0

## 2023-01-19 NOTE — PROGRESS NOTES
SUBJECTIVE:   CC: Torres is an 42 year old who presents for preventative health visit.   Patient has been advised of split billing requirements and indicates understanding: Yes  Healthy Habits:     Getting at least 3 servings of Calcium per day:  Yes    Bi-annual eye exam:  Yes    Dental care twice a year:  Yes    Sleep apnea or symptoms of sleep apnea:  Sleep apnea    Diet:  Vegetarian/vegan    Frequency of exercise:  2-3 days/week    Duration of exercise:  Less than 15 minutes    Taking medications regularly:  Yes    Medication side effects:  None    PHQ-2 Total Score: 0    Additional concerns today:  Yes        Elevated BP  Patient states that he has noticed that he has had elevated blood pressure for many years. He states he occasionally checks his blood pressure and when he does the numbers are as high as 140s over 90s. He states he has never bene formally diagnosed with hypertension however he has been working on diet and exercise       Today's PHQ-2 Score:   PHQ-2 ( 1999 [employer]) 1/19/2023   Q1: Little interest or pleasure in doing things 0   Q2: Feeling down, depressed or hopeless 0   PHQ-2 Score 0   PHQ-2 Total Score (12-17 Years)- Positive if 3 or more points; Administer PHQ-A if positive -   Q1: Little interest or pleasure in doing things Not at all   Q2: Feeling down, depressed or hopeless Not at all   PHQ-2 Score 0       Have you ever done Advance Care Planning? (For example, a Health Directive, POLST, or a discussion with a medical provider or your loved ones about your wishes): No, advance care planning information given to patient to review.  Patient plans to discuss their wishes with loved ones or provider.      Social History     Tobacco Use     Smoking status: Never     Smokeless tobacco: Never   Substance Use Topics     Alcohol use: Yes     Comment: 4 per week         Alcohol Use 1/19/2023   Prescreen: >3 drinks/day or >7 drinks/week? No   Prescreen: >3 drinks/day or >7 drinks/week? -       Last  "PSA: No results found for: PSA    Reviewed orders with patient. Reviewed health maintenance and updated orders accordingly - Yes  Lab work is in process  Labs reviewed in EPIC    Reviewed and updated as needed this visit by clinical staff   Tobacco  Allergies  Meds              Reviewed and updated as needed this visit by Provider                     Review of Systems   Constitutional: Negative for chills and fever.   HENT: Negative for congestion, ear pain, hearing loss and sore throat.    Eyes: Negative for pain and visual disturbance.   Respiratory: Negative for cough and shortness of breath.    Cardiovascular: Negative for chest pain, palpitations and peripheral edema.   Gastrointestinal: Negative for abdominal pain, constipation, diarrhea, heartburn, hematochezia and nausea.   Genitourinary: Negative for dysuria, frequency, genital sores, hematuria, impotence, penile discharge and urgency.   Musculoskeletal: Negative for arthralgias, joint swelling and myalgias.   Skin: Negative for rash.   Neurological: Negative for dizziness, weakness, headaches and paresthesias.   Psychiatric/Behavioral: Negative for mood changes. The patient is not nervous/anxious.          OBJECTIVE:   BP (!) 144/88   Pulse 87   Temp 98.7  F (37.1  C) (Oral)   Resp 14   Ht 1.753 m (5' 9\")   Wt 122.2 kg (269 lb 6.4 oz)   SpO2 98%   BMI 39.78 kg/m      Physical Exam  GENERAL: healthy, alert and no distress  EYES: Eyes grossly normal to inspection, PERRL and conjunctivae and sclerae normal  HENT: ear canals and TM's normal, nose and mouth without ulcers or lesions  NECK: no adenopathy, no asymmetry, masses, or scars and thyroid normal to palpation  RESP: lungs clear to auscultation - no rales, rhonchi or wheezes  CV: regular rate and rhythm, normal S1 S2, no S3 or S4, no murmur, click or rub, no peripheral edema and peripheral pulses strong  ABDOMEN: soft, nontender, no hepatosplenomegaly, no masses and bowel sounds normal  MS: no " gross musculoskeletal defects noted, no edema  SKIN: no suspicious lesions or rashes  NEURO: Normal strength and tone, mentation intact and speech normal  PSYCH: mentation appears normal, affect normal/bright    Diagnostic Test Results:  Labs reviewed in Epic  No results found for any visits on 23.    ASSESSMENT/PLAN:   (Z00.00) Routine general medical examination at a health care facility  (primary encounter diagnosis)  Comment: Stable  Plan: REVIEW OF HEALTH MAINTENANCE PROTOCOL ORDERS,         Basic metabolic panel  (Ca, Cl, CO2, Creat,         Gluc, K, Na, BUN), TSH with free T4 reflex        Pending labs      (Z11.4) Screening for HIV (human immunodeficiency virus)  Comment: Stable  Plan: HIV Antigen Antibody Combo        Pending labs      (Z11.59) Need for hepatitis C screening test  Comment: Stable  Plan: Hepatitis C Screen Reflex to HCV RNA Quant and         Genotype        Pending labs      (Z13.220) Screening for hyperlipidemia  Comment: Stable  Plan: Lipid panel reflex to direct LDL Non-fasting        Pending labs      (Z23) Need for vaccination  Comment: Stable  Plan: TDAP VACCINE (Adacel, Boostrix)            (R03.0) Elevated blood pressure reading without diagnosis of hypertension  Comment: Chronic. In office BP noted to be 144/88.Pt provided BP log and will do ambulatory readings. Pt to follow-up in 1 month for reassessment   Plan: Pending follow-up     Patient has been advised of split billing requirements and indicates understanding: Yes      COUNSELING:   Reviewed preventive health counseling, as reflected in patient instructions       Regular exercise       Healthy diet/nutrition       Immunizations    Vaccinated for: TDAP             Consider Hep C screening for all patients one time for ages 18-79 years       HIV screeninx in teen years, 1x in adult years, and at intervals if high risk       Advance Care Planning      BMI:   Estimated body mass index is 39.78 kg/m  as calculated from  "the following:    Height as of this encounter: 1.753 m (5' 9\").    Weight as of this encounter: 122.2 kg (269 lb 6.4 oz).   Weight management plan: Discussed healthy diet and exercise guidelines      He reports that he has never smoked. He has never used smokeless tobacco.            MIGEL ARORA MD  [facility] [place]  Answers for HPI/ROS submitted by the patient on 1/19/2023  If you checked off any problems, how difficult have these problems made it for you to do your work, take care of things at home, or get along with other people?: Not difficult at all  PHQ9 TOTAL SCORE: 1      "

## 2023-01-23 ENCOUNTER — LAB (OUTPATIENT)
Dept: LAB | Facility: CLINIC | Age: 43
End: 2023-01-23
Payer: COMMERCIAL

## 2023-01-23 DIAGNOSIS — Z00.00 ROUTINE GENERAL MEDICAL EXAMINATION AT A HEALTH CARE FACILITY: ICD-10-CM

## 2023-01-23 DIAGNOSIS — Z11.59 NEED FOR HEPATITIS C SCREENING TEST: ICD-10-CM

## 2023-01-23 DIAGNOSIS — Z11.4 SCREENING FOR HIV (HUMAN IMMUNODEFICIENCY VIRUS): ICD-10-CM

## 2023-01-23 DIAGNOSIS — Z13.220 SCREENING FOR HYPERLIPIDEMIA: ICD-10-CM

## 2023-01-23 LAB
ANION GAP SERPL CALCULATED.3IONS-SCNC: 7 MMOL/L (ref 7–15)
BUN SERPL-MCNC: 9.2 MG/DL (ref 6–20)
CALCIUM SERPL-MCNC: 9.5 MG/DL (ref 8.6–10)
CHLORIDE SERPL-SCNC: 103 MMOL/L (ref 98–107)
CHOLEST SERPL-MCNC: 173 MG/DL
CREAT SERPL-MCNC: 0.79 MG/DL (ref 0.67–1.17)
DEPRECATED HCO3 PLAS-SCNC: 29 MMOL/L (ref 22–29)
GFR SERPL CREATININE-BSD FRML MDRD: >90 ML/MIN/1.73M2
GLUCOSE SERPL-MCNC: 89 MG/DL (ref 70–99)
HCV AB SERPL QL IA: NONREACTIVE
HDLC SERPL-MCNC: 41 MG/DL
HIV 1+2 AB+HIV1 P24 AG SERPL QL IA: NONREACTIVE
LDLC SERPL CALC-MCNC: 113 MG/DL
NONHDLC SERPL-MCNC: 132 MG/DL
POTASSIUM SERPL-SCNC: 4 MMOL/L (ref 3.4–5.3)
SODIUM SERPL-SCNC: 139 MMOL/L (ref 136–145)
TRIGL SERPL-MCNC: 96 MG/DL
TSH SERPL DL<=0.005 MIU/L-ACNC: 1.64 UIU/ML (ref 0.3–4.2)

## 2023-01-23 PROCEDURE — 36415 COLL VENOUS BLD VENIPUNCTURE: CPT | Performed by: PATHOLOGY

## 2023-01-23 PROCEDURE — 86803 HEPATITIS C AB TEST: CPT | Performed by: STUDENT IN AN ORGANIZED HEALTH CARE EDUCATION/TRAINING PROGRAM

## 2023-01-23 PROCEDURE — 80061 LIPID PANEL: CPT | Performed by: PATHOLOGY

## 2023-01-23 PROCEDURE — 84443 ASSAY THYROID STIM HORMONE: CPT | Performed by: PATHOLOGY

## 2023-01-23 PROCEDURE — 87389 HIV-1 AG W/HIV-1&-2 AB AG IA: CPT | Performed by: STUDENT IN AN ORGANIZED HEALTH CARE EDUCATION/TRAINING PROGRAM

## 2023-01-23 PROCEDURE — 80048 BASIC METABOLIC PNL TOTAL CA: CPT | Performed by: PATHOLOGY

## 2023-01-25 NOTE — NURSING NOTE
1 year appointment reminder message was created and will be sent out 2 - 3 months prior to next appointment due date. Roya Yu, [facility] [address] [facility]

## 2023-02-16 ENCOUNTER — OFFICE VISIT (OUTPATIENT)
Dept: FAMILY MEDICINE | Facility: CLINIC | Age: 43
End: 2023-02-16
Payer: COMMERCIAL

## 2023-02-16 VITALS
BODY MASS INDEX: 39.69 KG/M2 | RESPIRATION RATE: 14 BRPM | DIASTOLIC BLOOD PRESSURE: 104 MMHG | TEMPERATURE: 97.5 F | SYSTOLIC BLOOD PRESSURE: 158 MMHG | HEIGHT: 69 IN | WEIGHT: 268 LBS | HEART RATE: 75 BPM | OXYGEN SATURATION: 97 %

## 2023-02-16 DIAGNOSIS — I10 ESSENTIAL HYPERTENSION, BENIGN: Primary | ICD-10-CM

## 2023-02-16 PROCEDURE — 99214 OFFICE O/P EST MOD 30 MIN: CPT | Performed by: STUDENT IN AN ORGANIZED HEALTH CARE EDUCATION/TRAINING PROGRAM

## 2023-02-16 RX ORDER — LISINOPRIL 20 MG/1
20 TABLET ORAL DAILY
Qty: 90 TABLET | Refills: 1 | Status: SHIPPED | OUTPATIENT
Start: 2023-02-16 | End: 2023-03-23

## 2023-02-16 NOTE — PROGRESS NOTES
Assessment & Plan     (I10) Essential hypertension, benign  (primary encounter diagnosis)  Comment: Chronic, uncontrolled.  At home blood pressure is noted to be uncontrolled as well as in office blood pressure noted to be 158/104.  Discussed importance of starting blood pressure medication to help in lowering blood pressure levels.  We will start lisinopril 20 mg and patient encouraged to monitor for side effects including cough.  Patient encouraged to continue lifestyle changes as they will contribute to decreases in blood pressure overall.  Patient weight detailed scale to be used as a roadmap to determine percent fat distribution within the body and provide a baseline framework to help her weight loss goals.  Will have patient follow-up in 1 month with use of 3 times a week blood pressure checks.  Plan: lisinopril (ZESTRIL) 20 MG tablet        Pending pickup and patient to follow-up in 1 month as well as be weighed again on special scale upon arrival                   Return in about 4 weeks (around 3/16/2023) for with me, using a video visit, using a phone visit.    MIGEL ARORA MD  [facility] FABIOLA Macdonald is a 42 year old accompanied by his none, presenting for the following health issues:  Hypertension      History of Present Illness       Hypertension: He presents for follow up of hypertension.  He does check blood pressure  regularly outside of the clinic. Outside blood pressures have been over 140/90. He does not follow a low salt diet.       Patient reports that he has been taking his blood pressure at home with his blood pressure cuff and has noticed that most of his numbers range from systolics of 140s to 160s and diastolics of 90s to 100s.  Patient states that he was never previously diagnosed with blood pressure however states that the numbers have been concerning.  He reports engaging in exercise daily currently at the  and consistently living a balanced and  "healthy diet.  Patient states he is interested in finding ways to improve his blood pressure overall.            Review of Systems   Constitutional, HEENT, cardiovascular, pulmonary, gi and gu systems are negative, except as otherwise noted.      Objective    BP (!) 158/104   Pulse 75   Temp 97.5  F (36.4  C) (Temporal)   Resp 14   Ht 1.753 m (5' 9\")   Wt 121.6 kg (268 lb)   SpO2 97%   BMI 39.58 kg/m    Body mass index is 39.58 kg/m .  Physical Exam   GENERAL: healthy, alert and no distress  EYES: Eyes grossly normal to inspection, PERRL and conjunctivae and sclerae normal  RESP: lungs clear to auscultation - no rales, rhonchi or wheezes  CV: regular rate and rhythm, normal S1 S2, no S3 or S4, no murmur, click or rub, no peripheral edema and peripheral pulses strong  MS: no gross musculoskeletal defects noted, no edema    No results found for any visits on 02/16/23.                "

## 2023-03-23 ENCOUNTER — OFFICE VISIT (OUTPATIENT)
Dept: FAMILY MEDICINE | Facility: CLINIC | Age: 43
End: 2023-03-23
Payer: COMMERCIAL

## 2023-03-23 VITALS
DIASTOLIC BLOOD PRESSURE: 86 MMHG | HEART RATE: 61 BPM | WEIGHT: 260 LBS | TEMPERATURE: 97.7 F | SYSTOLIC BLOOD PRESSURE: 134 MMHG | OXYGEN SATURATION: 98 % | RESPIRATION RATE: 18 BRPM | BODY MASS INDEX: 38.4 KG/M2

## 2023-03-23 DIAGNOSIS — I10 ESSENTIAL HYPERTENSION, BENIGN: ICD-10-CM

## 2023-03-23 LAB — HBA1C MFR BLD: 5.1 % (ref 0–5.6)

## 2023-03-23 PROCEDURE — 99213 OFFICE O/P EST LOW 20 MIN: CPT | Performed by: STUDENT IN AN ORGANIZED HEALTH CARE EDUCATION/TRAINING PROGRAM

## 2023-03-23 PROCEDURE — 36415 COLL VENOUS BLD VENIPUNCTURE: CPT | Performed by: STUDENT IN AN ORGANIZED HEALTH CARE EDUCATION/TRAINING PROGRAM

## 2023-03-23 PROCEDURE — 83036 HEMOGLOBIN GLYCOSYLATED A1C: CPT | Performed by: STUDENT IN AN ORGANIZED HEALTH CARE EDUCATION/TRAINING PROGRAM

## 2023-03-23 RX ORDER — LISINOPRIL 20 MG/1
20 TABLET ORAL DAILY
Qty: 90 TABLET | Refills: 3 | Status: SHIPPED | OUTPATIENT
Start: 2023-03-23 | End: 2024-05-17

## 2023-03-23 NOTE — PROGRESS NOTES
Assessment & Plan     (I10) Essential hypertension, benign  Comment: Chronic, stable and improved. Pt compliant with medication and with additional 9 lb weight loss (intentional) since January. Encouraged to continue with dietary changes and exercise. Labs discussed during visit and will order a1c today as concerns for diabetes were discussed.   Plan: lisinopril (ZESTRIL) 20 MG tablet, Hemoglobin         A1c        Pending labs                     MIGEL ARORA MD  [facility] FABIOLA Macdonald is a 42 year old, presenting for the following health issues:  Hypertension  No flowsheet data found.  History of Present Illness       Hypertension: He presents for follow up of hypertension.  He does check blood pressure  regularly outside of the clinic. Outside blood pressures have been over 140/90. He does not follow a low salt diet.     He eats 4 or more servings of fruits and vegetables daily.He consumes 0 sweetened beverage(s) daily.He exercises with enough effort to increase his heart rate 30 to 60 minutes per day.  He exercises with enough effort to increase his heart rate 7 days per week.   He is taking medications regularly.     Patient reports that he is overall doing very well and has been taking his blood pressure regularly.  He states that the medication has been going well and denies any side effects and states that he has been actively working out and monitoring his diet.  He reports that he did start a new job at the end of February which contributed to some elevations in his blood pressure but overall has been noticing a decline in both systolic and diastolic numbers.  He reports doing some research and noticed that during his labs with his annual visit an A1c was not performed and was hoping to obtain one today.            Review of Systems   Constitutional, HEENT, cardiovascular, pulmonary, gi and gu systems are negative, except as otherwise noted.      Objective    BP  134/86   Pulse 61   Temp 97.7  F (36.5  C) (Oral)   Resp 18   Wt 117.9 kg (260 lb)   SpO2 98%   BMI 38.40 kg/m    Body mass index is 38.4 kg/m .  Physical Exam   GENERAL: healthy, alert and no distress  EYES: Eyes grossly normal to inspection, PERRL and conjunctivae and sclerae normal  RESP: lungs clear to auscultation - no rales, rhonchi or wheezes  CV: regular rate and rhythm, normal S1 S2, no S3 or S4, no murmur, click or rub, no peripheral edema and peripheral pulses strong  MS: no gross musculoskeletal defects noted, no edema  SKIN: no suspicious lesions or rashes  PSYCH: mentation appears normal, affect normal/bright    Results for orders placed or performed in visit on 03/23/23   Hemoglobin A1c     Status: Normal   Result Value Ref Range    Hemoglobin A1C 5.1 0.0 - 5.6 %

## 2023-10-17 ENCOUNTER — OFFICE VISIT (OUTPATIENT)
Dept: FAMILY MEDICINE | Facility: CLINIC | Age: 43
End: 2023-10-17
Payer: COMMERCIAL

## 2023-10-17 VITALS
HEIGHT: 69 IN | OXYGEN SATURATION: 97 % | WEIGHT: 275.8 LBS | DIASTOLIC BLOOD PRESSURE: 88 MMHG | RESPIRATION RATE: 16 BRPM | SYSTOLIC BLOOD PRESSURE: 128 MMHG | BODY MASS INDEX: 40.85 KG/M2 | TEMPERATURE: 98.4 F | HEART RATE: 76 BPM

## 2023-10-17 DIAGNOSIS — I10 ESSENTIAL HYPERTENSION: Primary | ICD-10-CM

## 2023-10-17 PROCEDURE — 99213 OFFICE O/P EST LOW 20 MIN: CPT | Performed by: STUDENT IN AN ORGANIZED HEALTH CARE EDUCATION/TRAINING PROGRAM

## 2023-10-17 ASSESSMENT — PATIENT HEALTH QUESTIONNAIRE - PHQ9
SUM OF ALL RESPONSES TO PHQ QUESTIONS 1-9: 4
SUM OF ALL RESPONSES TO PHQ QUESTIONS 1-9: 4
10. IF YOU CHECKED OFF ANY PROBLEMS, HOW DIFFICULT HAVE THESE PROBLEMS MADE IT FOR YOU TO DO YOUR WORK, TAKE CARE OF THINGS AT HOME, OR GET ALONG WITH OTHER PEOPLE: NOT DIFFICULT AT ALL

## 2023-10-17 NOTE — PROGRESS NOTES
"  Assessment & Plan     (I10) Essential hypertension  (primary encounter diagnosis)  Comment: Chronic, stable.   Plan: Discussed options for improving sedentary lifestyle. Encouraged a standing desk and a floor treadmill. Pt to follow up in March for annual physical    Dictation Disclaimer: Some of this Note has been completed with voice-recognition dictation software. Although errors are generally corrected real-time, there is the potential for a rare error to be present in the completed chart.                  BMI:   Estimated body mass index is 40.73 kg/m  as calculated from the following:    Height as of this encounter: 1.753 m (5' 9\").    Weight as of this encounter: 125.1 kg (275 lb 12.8 oz).   Weight management plan: Discussed healthy diet and exercise guidelines        MIGEL ARORA MD  [facility] FABIOLA Macdonald is a 43 year old, presenting for the following health issues:  Hypertension        10/17/2023     1:56 PM   Additional Questions   Roomed by Yahaira       History of Present Illness       Hypertension: He presents for follow up of hypertension.  He does not check blood pressure  regularly outside of the clinic. Outside blood pressures have been over 140/90. He does not follow a low salt diet.     He eats 2-3 servings of fruits and vegetables daily.He consumes 0 sweetened beverage(s) daily.He exercises with enough effort to increase his heart rate 9 or less minutes per day.  He exercises with enough effort to increase his heart rate 3 or less days per week. He is missing 1 dose(s) of medications per week.     The patient presents for evaluation of multiple medical concerns.    His blood pressure has been creeping down. He works full-time with [employer] and a bunch of other companies. He works from home and does not move. He is very sedentary at work.             Review of Systems   Constitutional, HEENT, cardiovascular, pulmonary, gi and gu systems are negative, " "except as otherwise noted.      Objective    /88   Pulse 76   Temp 98.4  F (36.9  C) (Tympanic)   Resp 16   Ht 1.753 m (5' 9\")   Wt 125.1 kg (275 lb 12.8 oz)   SpO2 97%   BMI 40.73 kg/m    Body mass index is 40.73 kg/m .  Physical Exam   GENERAL: healthy, alert and no distress  EYES: Eyes grossly normal to inspection, PERRL and conjunctivae and sclerae normal  Neck: No visible JVD or lymphadenopathy   RESP: symmetrical rise in chest   CV: No peripheral edema notable   MS: no gross musculoskeletal defects noted  SKIN: no suspicious lesions or rashes  PSYCH: mentation appears normal, affect normal/bright      No results found for any visits on 10/17/23.                  "

## 2024-01-04 ENCOUNTER — PATIENT OUTREACH (OUTPATIENT)
Dept: CARE COORDINATION | Facility: CLINIC | Age: 44
End: 2024-01-04
Payer: COMMERCIAL

## 2024-01-18 ENCOUNTER — PATIENT OUTREACH (OUTPATIENT)
Dept: CARE COORDINATION | Facility: CLINIC | Age: 44
End: 2024-01-18
Payer: COMMERCIAL

## 2024-02-25 ENCOUNTER — OFFICE VISIT (OUTPATIENT)
Dept: FAMILY MEDICINE | Facility: CLINIC | Age: 44
End: 2024-02-25
Payer: COMMERCIAL

## 2024-02-25 VITALS
HEART RATE: 96 BPM | OXYGEN SATURATION: 98 % | TEMPERATURE: 98 F | RESPIRATION RATE: 16 BRPM | WEIGHT: 287 LBS | SYSTOLIC BLOOD PRESSURE: 155 MMHG | BODY MASS INDEX: 42.38 KG/M2 | DIASTOLIC BLOOD PRESSURE: 93 MMHG

## 2024-02-25 DIAGNOSIS — J10.1 INFLUENZA B: Primary | ICD-10-CM

## 2024-02-25 DIAGNOSIS — Z20.828 CONTACT WITH OR EXPOSURE TO VIRAL DISEASE: ICD-10-CM

## 2024-02-25 LAB
DEPRECATED S PYO AG THROAT QL EIA: NEGATIVE
FLUAV AG SPEC QL IA: NEGATIVE
FLUBV AG SPEC QL IA: POSITIVE
GROUP A STREP BY PCR: NOT DETECTED

## 2024-02-25 PROCEDURE — 87651 STREP A DNA AMP PROBE: CPT | Performed by: NURSE PRACTITIONER

## 2024-02-25 PROCEDURE — 87804 INFLUENZA ASSAY W/OPTIC: CPT | Performed by: NURSE PRACTITIONER

## 2024-02-25 PROCEDURE — 99213 OFFICE O/P EST LOW 20 MIN: CPT | Performed by: NURSE PRACTITIONER

## 2024-02-25 NOTE — PROGRESS NOTES
SUBJECTIVE:  Torres Cuevas is a 43 year old male who presents to the clinic today with a chief complaint of cough  for 4 day(s).  His cough is described as productive mucoid.    The patient's symptoms are mild and improving.  Associated symptoms include myalgias. The patient's symptoms are exacerbated by no particular triggers  Patient has been using ibuprofen and Tylenol  to improve symptoms.    Past Medical History:   Diagnosis Date    Seasonal allergic rhinitis        Social History     Tobacco Use    Smoking status: Never    Smokeless tobacco: Never   Vaping Use    Vaping Use: Never used   Substance Use Topics    Alcohol use: Yes     Comment: 4 per week    Drug use: No         ROS  Review of systems negative except as stated above.    OBJECTIVE:  BP (!) 155/93   Pulse 96   Temp 98  F (36.7  C)   Resp 16   Wt 130.2 kg (287 lb)   SpO2 98%   BMI 42.38 kg/m    GENERAL APPEARANCE: healthy, alert and no distress  EYES: EOMI,  PERRL, conjunctiva clear  HENT: ear canals and TM's normal.  Nose and mouth without ulcers, erythema or lesions  NECK: supple, nontender, no lymphadenopathy  RESP: lungs clear to auscultation - no rales, rhonchi or wheezes  CV: regular rates and rhythm, normal S1 S2, no murmur noted  ABDOMEN:  soft, nontender, no HSM or masses and bowel sounds normal  NEURO: Normal strength and tone, sensory exam grossly normal,  normal speech and mentation  SKIN: no suspicious lesions or rashes    Results for orders placed or performed in visit on 02/25/24   Streptococcus A Rapid Screen w/Reflex to PCR - Clinic Collect     Status: Normal    Specimen: Throat; Swab   Result Value Ref Range    Group A Strep antigen Negative Negative   Influenza A & B Antigen - Clinic Collect     Status: Abnormal    Specimen: Nose; Swab   Result Value Ref Range    Influenza A antigen Negative Negative    Influenza B antigen Positive (A) Negative    Narrative    Test results must be correlated with clinical data. If necessary,  results should be confirmed by a molecular assay or viral culture.         ASSESSMENT:    (J10.1) Influenza B  (primary encounter diagnosis)  Comment: Influenza B was positive patient is out of the Tamiflu window he is improving strep was negative we will continue to monitor symptoms  Plan:     (Z20.828) Contact with or exposure to viral disease  Comment:   Plan: Streptococcus A Rapid Screen w/Reflex to PCR -         Clinic Collect, Influenza A & B Antigen -         Clinic Collect, Group A Streptococcus PCR         Throat Swab        PLAN:  Amoxicillin 875mg two times per day for 10 days.    Symptomatic measures encouraged, humidified air, plenty of fluids.    ELIZ Portillo CNP

## 2024-04-14 ENCOUNTER — HEALTH MAINTENANCE LETTER (OUTPATIENT)
Age: 44
End: 2024-04-14

## 2024-05-17 DIAGNOSIS — I10 ESSENTIAL HYPERTENSION, BENIGN: ICD-10-CM

## 2024-05-17 RX ORDER — LISINOPRIL 20 MG/1
20 TABLET ORAL DAILY
Qty: 90 TABLET | Refills: 2 | Status: SHIPPED | OUTPATIENT
Start: 2024-05-17

## 2024-06-08 NOTE — PROGRESS NOTES
6 month STM    STM Recheck Visit     Diagnostic AHI:   10.1  HST    Data only recheck     Assessment: Pt meeting objective benchmarks.     Action plan:   pt to follow up per provider request       Device type: Auto-CPAP  PAP settings: CPAP min 8.0 cm  H20     CPAP max 14.0 cm  H20    95th% pressure 11.7 cm  H20   Objective measures: 14 day rolling measures      Compliance  100 %      Leak  12.72 lpm  last  upload      AHI 0.25   last  upload      Average number of minutes 414      Objective measure goal  Compliance   Goal >70%  Leak   Goal < 24 lpm  AHI  Goal < 5  Usage  Goal >240      
no back pain/no chest pain/no chills/no fever/no shortness of breath

## 2024-06-13 ENCOUNTER — MYC MEDICAL ADVICE (OUTPATIENT)
Dept: FAMILY MEDICINE | Facility: CLINIC | Age: 44
End: 2024-06-13
Payer: COMMERCIAL

## 2024-06-13 DIAGNOSIS — Z30.2 ENCOUNTER FOR VASECTOMY: Primary | ICD-10-CM

## 2024-06-16 ENCOUNTER — MYC MEDICAL ADVICE (OUTPATIENT)
Dept: FAMILY MEDICINE | Facility: CLINIC | Age: 44
End: 2024-06-16
Payer: COMMERCIAL

## 2024-06-24 ENCOUNTER — DOCUMENTATION ONLY (OUTPATIENT)
Dept: SLEEP MEDICINE | Facility: CLINIC | Age: 44
End: 2024-06-24
Payer: COMMERCIAL

## 2024-06-24 DIAGNOSIS — G47.33 OSA (OBSTRUCTIVE SLEEP APNEA): Primary | ICD-10-CM

## 2024-06-24 NOTE — TELEPHONE ENCOUNTER
Patient requesting sooner office visit to address concern of possible reflux vs possible hernia.     He lives in [place] and asked to be seen at that location (in the absence of Dr. Reynoso).     Future Appointments 6/24/2024 - 12/21/2024        Date Visit Type Length Department Provider     6/25/2024  9:00 AM OFFICE VISIT 30 min [facility]/Carla Napier PA    Location Instructions:     [facility] is located at [address], just north of the exit off of [address] [address]. Free parking is available. If traveling north, access the lot directly from [address]; if traveling south on [address], turn east on [address] to access the lot.              9/26/2024  4:40 PM PREVENTATIVE ADULT 40 min [facility] Malini Young MD    Location Instructions:     [facility] is located at [address] [place] [address], one mile north of the exit off of [address] [address]. From [facility], turn east on [address] [place] [address] or [facility] to access the service road that connects to the parking lot. Be sure to enter the building labeled [address], as another [place] building is across [facility] from the clinic.&nbsp;                    Lashawn Xiong RN BSN  [facility]

## 2024-06-25 ENCOUNTER — OFFICE VISIT (OUTPATIENT)
Dept: FAMILY MEDICINE | Facility: CLINIC | Age: 44
End: 2024-06-25
Payer: COMMERCIAL

## 2024-06-25 VITALS
BODY MASS INDEX: 41.77 KG/M2 | DIASTOLIC BLOOD PRESSURE: 86 MMHG | WEIGHT: 282 LBS | SYSTOLIC BLOOD PRESSURE: 132 MMHG | OXYGEN SATURATION: 96 % | RESPIRATION RATE: 18 BRPM | HEIGHT: 69 IN | HEART RATE: 73 BPM | TEMPERATURE: 98.6 F

## 2024-06-25 DIAGNOSIS — R10.10 UPPER ABDOMINAL PAIN: Primary | ICD-10-CM

## 2024-06-25 DIAGNOSIS — E66.01 MORBID OBESITY (H): ICD-10-CM

## 2024-06-25 PROBLEM — I10 ESSENTIAL HYPERTENSION, BENIGN: Status: ACTIVE | Noted: 2024-06-25

## 2024-06-25 PROCEDURE — 99213 OFFICE O/P EST LOW 20 MIN: CPT | Performed by: PHYSICIAN ASSISTANT

## 2024-06-25 PROCEDURE — G2211 COMPLEX E/M VISIT ADD ON: HCPCS | Performed by: PHYSICIAN ASSISTANT

## 2024-06-25 RX ORDER — OMEPRAZOLE 40 MG/1
40 CAPSULE, DELAYED RELEASE ORAL DAILY
Qty: 30 CAPSULE | Refills: 1 | Status: SHIPPED | OUTPATIENT
Start: 2024-06-25 | End: 2024-07-11

## 2024-06-25 ASSESSMENT — PAIN SCALES - GENERAL: PAINLEVEL: NO PAIN (0)

## 2024-06-25 NOTE — PROGRESS NOTES
"  Assessment & Plan     Upper abdominal pain  Discussed with patient today that based on his symptoms and alleviating factors I suspect this is gastritis. I recommend doing a trial of PPI for 2-4 weeks. In addition lifestyle changes with decreasing spicy or acid foods, smaller portion size, and sitting up 30 min after eating. Follow up in 2 week to recheck at that time can consider imaging and labs if not improving. Patient agree's with this plan and has no further questions  - omeprazole (PRILOSEC) 40 MG DR capsule; Take 1 capsule (40 mg) by mouth daily    Morbid obesity (H)  Diet and exercise discussed          BMI  Estimated body mass index is 41.15 kg/m  as calculated from the following:    Height as of this encounter: 1.763 m (5' 9.41\").    Weight as of this encounter: 127.9 kg (282 lb).             Zheng Macdonald is a 44 year old, presenting for the following health issues:  Gastrophageal Reflux    History of Present Illness       Reason for visit:  Something is going on with my stomach  Symptom onset:  1-2 weeks ago    He eats 2-3 servings of fruits and vegetables daily.He consumes 0 sweetened beverage(s) daily.He exercises with enough effort to increase his heart rate 9 or less minutes per day.  He exercises with enough effort to increase his heart rate 3 or less days per week. He is missing 1 dose(s) of medications per week.  He is not taking prescribed medications regularly due to remembering to take.         GERD/Heartburn  Onset/Duration: 6/8/2024  Description: Abdominal bloating and tightness in his upper abdomin with any particular food or liquids. It can last for 1-2 hrs after eating or drinking.  Progression of Symptoms: waxing and waning  Accompanying Signs & Symptoms:  Does it feel like food gets stuck or trouble swallowing: No  Nausea: No  Vomiting (bloody?): No  Abdominal Pain: No  Black-Tarry stools: No  Bloody stools: No  History:  Previous similar episodes: No  Previous ulcers: " "No  Precipitating factors:   Caffeine use: YES- a lot   Alcohol use: YES- occasional  NSAID/Aspirin use: YES- started taking 81MG Aspirin   Tobacco use: No  Worse with no particular food or drink.  Alleviating factors: Nexium   Therapies tried and outcome:             Lifestyle changes: Eating smaller meals and decreased Coffee intake.            Medications: Nexium is effective. Pepcid (famotidine) and Tums was ineffective.         Review of Systems  Constitutional, HEENT, cardiovascular, pulmonary, gi and gu systems are negative, except as otherwise noted.      Objective    Temp 98.6  F (37  C) (Oral)   Resp 18   Ht 1.763 m (5' 9.41\")   Wt 127.9 kg (282 lb)   BMI 41.15 kg/m    Body mass index is 41.15 kg/m .  Physical Exam   GENERAL: alert and no distress  NECK: no adenopathy, no asymmetry, masses, or scars  RESP: lungs clear to auscultation - no rales, rhonchi or wheezes  CV: regular rate and rhythm, normal S1 S2, no S3 or S4, no murmur, click or rub, no peripheral edema  ABDOMEN: soft, nontender, no hepatosplenomegaly, no masses and bowel sounds normal  MS: no gross musculoskeletal defects noted, no edema            Signed Electronically by: SYLVIE Whiteside    "

## 2024-06-28 ENCOUNTER — VIRTUAL VISIT (OUTPATIENT)
Dept: UROLOGY | Facility: CLINIC | Age: 44
End: 2024-06-28
Attending: FAMILY MEDICINE
Payer: COMMERCIAL

## 2024-06-28 DIAGNOSIS — Z30.2 ENCOUNTER FOR VASECTOMY: ICD-10-CM

## 2024-06-28 PROCEDURE — 99203 OFFICE O/P NEW LOW 30 MIN: CPT | Mod: 95 | Performed by: UROLOGY

## 2024-06-28 NOTE — PROGRESS NOTES
Torres is a 44 year old who is being evaluated via a billable video visit.    How would you like to obtain your AVS? MyChart  If the video visit is dropped, the invitation should be resent by: Text to cell phone: [phone]  Will anyone else be joining your video visit? No          Subjective   Torres is a 44 year old, presenting for the following health issues:  Consult    HPI     Patient is a 44 y.o.  male who was requested to be seen by Dr. Healy for vasectomy consult.  He has 2 kids.      Review of Systems  Constitutional, neuro, ENT, endocrine, pulmonary, cardiac, gastrointestinal, genitourinary, musculoskeletal, integument and psychiatric systems are negative, except as otherwise noted.      Objective           Vitals:  No vitals were obtained today due to virtual visit.    Physical Exam   GENERAL: alert and no distress  EYES: Eyes grossly normal to inspection.  No discharge or erythema, or obvious scleral/conjunctival abnormalities.  RESP: No audible wheeze, cough, or visible cyanosis.    SKIN: Visible skin clear. No significant rash, abnormal pigmentation or lesions.  NEURO: Cranial nerves grossly intact.  Mentation and speech appropriate for age.  PSYCH: Appropriate affect, tone, and pace of words    Discussed    That vasectomy is permanent method of birth control.    That vasectomy can fail due to recanalization of the vas even many months/years later.    That he needs 2 negative sperm checks to be considered sterile    That there are other methods that are not permanent and also that the sperm can be frozen for later use.    How the technique is performed, risks of infection, bleeding, damage to the testes vessels and testes atrophy    Long term complication such as chronic and difficult to treat testes pain and questionable increase incidence of prostate cancer    That the procedure can be done at the clinic or hospital OR        Plan:    Stop Aspirin  Will schedule Vasectomy in the future       Video-Visit Details    Type of service:  Video Visit   Originating Location (pt. Location): Home    Distant Location (provider location):  On-site  Platform used for Video Visit: Doxella  Signed Electronically by: Jose Luis Blount MD

## 2024-07-11 ENCOUNTER — OFFICE VISIT (OUTPATIENT)
Dept: FAMILY MEDICINE | Facility: CLINIC | Age: 44
End: 2024-07-11
Payer: COMMERCIAL

## 2024-07-11 VITALS
OXYGEN SATURATION: 96 % | BODY MASS INDEX: 41.97 KG/M2 | HEIGHT: 69 IN | SYSTOLIC BLOOD PRESSURE: 121 MMHG | DIASTOLIC BLOOD PRESSURE: 84 MMHG | HEART RATE: 73 BPM | TEMPERATURE: 98.7 F | WEIGHT: 283.38 LBS | RESPIRATION RATE: 18 BRPM

## 2024-07-11 DIAGNOSIS — R63.5 WEIGHT GAIN: ICD-10-CM

## 2024-07-11 DIAGNOSIS — K29.00 OTHER ACUTE GASTRITIS WITHOUT HEMORRHAGE: Primary | ICD-10-CM

## 2024-07-11 PROCEDURE — 99213 OFFICE O/P EST LOW 20 MIN: CPT | Performed by: PHYSICIAN ASSISTANT

## 2024-07-11 RX ORDER — OMEPRAZOLE 40 MG/1
40 CAPSULE, DELAYED RELEASE ORAL DAILY
Qty: 90 CAPSULE | Refills: 1 | Status: SHIPPED | OUTPATIENT
Start: 2024-07-11

## 2024-07-11 ASSESSMENT — PAIN SCALES - GENERAL: PAINLEVEL: NO PAIN (0)

## 2024-07-11 NOTE — PROGRESS NOTES
"  Assessment & Plan     Other acute gastritis without hemorrhage  Patients symptoms have resolved after starting PPI. WE discussed long term use of medication and the benefits and risks. For now patient will continue with medication for the next couple of months. Advised if he does stop then to slowly taper off medication to avoid rebound. Continue with lifestyle changes and follow up as needed. Patient agree's with this plan and has no further questions  - omeprazole (PRILOSEC) 40 MG DR capsule; Take 1 capsule (40 mg) by mouth daily    Weight gain  Discussed with patient today about weight loss medication as he is interested in starting the GLP-1 medication. We discussed risks and benefits of medication. For now patient will continue to work on lifestyle changes and follow up with his PCP in September to discuss more.           BMI  Estimated body mass index is 41.37 kg/m  as calculated from the following:    Height as of this encounter: 1.763 m (5' 9.4\").    Weight as of this encounter: 128.5 kg (283 lb 6 oz).   Weight management plan: Discussed healthy diet and exercise guidelines          Zheng Macdonald is a 44 year old, presenting for the following health issues:  Abdominal Pain    CONCERNS   Upper abdominal pain  Gastritis follow up. He Patient reports feeling much improved. He is eating and drinking without any symptoms. He has been avoiding spicy and acidic foods. He has been cutting out alcohol. He had been taking Omeprazole (PRILOSEC) 40 MG DR and has help. Patient voices had no issues in the last two weeks. He is questioning if will symptoms return.     Weight   Patient has struggled with his weight gain over the last few years. He does on average eat healthy. He does not exercises regularly but stays active. He is interested in weight loss medication.    He eats 2-3 servings of fruits and vegetables daily.He consumes 1 sweetened beverage(s) daily.He exercises with enough effort to increase his heart " "rate 10 to 19 minutes per day.  He exercises with enough effort to increase his heart rate 3 or less days per week. He is taking medications regularly.               Review of Systems  Constitutional, HEENT, cardiovascular, pulmonary, gi and gu systems are negative, except as otherwise noted.      Objective    /84   Pulse 73   Temp 98.7  F (37.1  C) (Oral)   Resp 18   Ht 1.763 m (5' 9.4\")   Wt 128.5 kg (283 lb 6 oz)   SpO2 96%   BMI 41.37 kg/m    Body mass index is 41.37 kg/m .  Physical Exam   GENERAL: alert and no distress  EYES: Eyes grossly normal to inspection, PERRL and conjunctivae and sclerae normal  MS: no gross musculoskeletal defects noted, no edema  SKIN: no suspicious lesions or rashes  PSYCH: mentation appears normal, affect normal/bright            Signed Electronically by: SYLVIE Whiteside    "

## 2024-07-17 ENCOUNTER — PATIENT OUTREACH (OUTPATIENT)
Dept: CARE COORDINATION | Facility: CLINIC | Age: 44
End: 2024-07-17
Payer: COMMERCIAL

## 2024-07-24 ENCOUNTER — TELEPHONE (OUTPATIENT)
Dept: UROLOGY | Facility: CLINIC | Age: 44
End: 2024-07-24
Payer: COMMERCIAL

## 2024-07-24 NOTE — TELEPHONE ENCOUNTER
Left message for patient to call [phone] to reschedule 8/20 vasectomy as MD out of clinic. OK to add on 8/27 (in hold spot) or sooner in [facility] if patient prefers.  Prabha Leach, CMA

## 2024-07-25 ENCOUNTER — TELEPHONE (OUTPATIENT)
Dept: UROLOGY | Facility: CLINIC | Age: 44
End: 2024-07-25
Payer: COMMERCIAL

## 2024-07-25 NOTE — TELEPHONE ENCOUNTER
[facility] Call Center    Phone Message    May a detailed message be left on voicemail: no     Reason for Call: Other: Patient is calling back to reschedule his vasectomy that they had called about yesterday. Please sultana patient to schedule.     Action Taken: Other: [facility]    Travel Screening: Not Applicable     Date of Service:

## 2024-09-17 ENCOUNTER — OFFICE VISIT (OUTPATIENT)
Dept: UROLOGY | Facility: CLINIC | Age: 44
End: 2024-09-17
Payer: COMMERCIAL

## 2024-09-17 VITALS — OXYGEN SATURATION: 97 % | HEART RATE: 72 BPM | DIASTOLIC BLOOD PRESSURE: 92 MMHG | SYSTOLIC BLOOD PRESSURE: 142 MMHG

## 2024-09-17 DIAGNOSIS — I10 ESSENTIAL HYPERTENSION, BENIGN: ICD-10-CM

## 2024-09-17 DIAGNOSIS — Z30.2 ENCOUNTER FOR STERILIZATION: Primary | ICD-10-CM

## 2024-09-17 PROCEDURE — 55250 REMOVAL OF SPERM DUCT(S): CPT | Performed by: UROLOGY

## 2024-09-17 PROCEDURE — 88302 TISSUE EXAM BY PATHOLOGIST: CPT | Performed by: PATHOLOGY

## 2024-09-17 PROCEDURE — 99000 SPECIMEN HANDLING OFFICE-LAB: CPT | Performed by: UROLOGY

## 2024-09-17 RX ORDER — FEXOFENADINE HCL 180 MG/1
180 TABLET ORAL DAILY PRN
COMMUNITY

## 2024-09-17 NOTE — PROGRESS NOTES
Post Vasectomy education documentation:    To improve patient experience and health outcomes, patient was educated Post vasectomy .    Teaching method:  Patient received written materials handout and general discussion/verbal explanation. Patient was provided with post vasectomy instruction sheet, post vasectomy semen collection sheet and x2 labeled sterile specimen cups.     Pressure held to right area x2 minutes per Dr. Blount.    All questions answered. Patient acknowledged understanding on the education.    Leigh FIERRO RN Urology 9/17/2024 9:04 AM

## 2024-09-17 NOTE — PROGRESS NOTES
Patient returns to clinic for vasectomy.  After informed consent has been obtained, he was placed supine in the OR table.  He was draped and prepped in usual surgical fashion.  2% lidocaine used for local anesthetics.  A scalpel less technique was used.  A small nick was made in the skin after vas identified/clamped.  Surrounding tissue was  from vas.  A small portion of vas was excised.  Lumen of vas burned.  Vas tied with silk sutures after proximal portion closed.  3.0 chromic suture to close skin opening.  This was again repeated on the other side.    Post vas instructions given to patient today.    Elevated  bp: Torres to follow up with Primary Care provider regarding elevated blood pressure.

## 2024-09-19 LAB
PATH REPORT.COMMENTS IMP SPEC: NORMAL
PATH REPORT.COMMENTS IMP SPEC: NORMAL
PATH REPORT.FINAL DX SPEC: NORMAL
PATH REPORT.GROSS SPEC: NORMAL
PATH REPORT.MICROSCOPIC SPEC OTHER STN: NORMAL
PATH REPORT.RELEVANT HX SPEC: NORMAL
PHOTO IMAGE: NORMAL

## 2024-09-26 ENCOUNTER — OFFICE VISIT (OUTPATIENT)
Dept: FAMILY MEDICINE | Facility: CLINIC | Age: 44
End: 2024-09-26
Payer: COMMERCIAL

## 2024-09-26 VITALS
HEART RATE: 77 BPM | RESPIRATION RATE: 18 BRPM | TEMPERATURE: 97.2 F | DIASTOLIC BLOOD PRESSURE: 84 MMHG | OXYGEN SATURATION: 97 % | BODY MASS INDEX: 42.63 KG/M2 | SYSTOLIC BLOOD PRESSURE: 134 MMHG | HEIGHT: 69 IN | WEIGHT: 287.8 LBS

## 2024-09-26 DIAGNOSIS — Z13.1 SCREENING FOR DIABETES MELLITUS: ICD-10-CM

## 2024-09-26 DIAGNOSIS — E66.01 CLASS 3 SEVERE OBESITY DUE TO EXCESS CALORIES WITH SERIOUS COMORBIDITY AND BODY MASS INDEX (BMI) OF 40.0 TO 44.9 IN ADULT (H): ICD-10-CM

## 2024-09-26 DIAGNOSIS — E66.813 CLASS 3 SEVERE OBESITY DUE TO EXCESS CALORIES WITH SERIOUS COMORBIDITY AND BODY MASS INDEX (BMI) OF 40.0 TO 44.9 IN ADULT (H): ICD-10-CM

## 2024-09-26 DIAGNOSIS — Z00.00 ROUTINE GENERAL MEDICAL EXAMINATION AT A HEALTH CARE FACILITY: Primary | ICD-10-CM

## 2024-09-26 DIAGNOSIS — I10 ESSENTIAL HYPERTENSION, BENIGN: ICD-10-CM

## 2024-09-26 DIAGNOSIS — N48.29 FORESKIN INFLAMMATION: ICD-10-CM

## 2024-09-26 DIAGNOSIS — Z23 NEED FOR VACCINATION: ICD-10-CM

## 2024-09-26 LAB
EST. AVERAGE GLUCOSE BLD GHB EST-MCNC: 100 MG/DL
HBA1C MFR BLD: 5.1 % (ref 0–5.6)

## 2024-09-26 PROCEDURE — 80053 COMPREHEN METABOLIC PANEL: CPT | Performed by: STUDENT IN AN ORGANIZED HEALTH CARE EDUCATION/TRAINING PROGRAM

## 2024-09-26 PROCEDURE — 83036 HEMOGLOBIN GLYCOSYLATED A1C: CPT | Performed by: STUDENT IN AN ORGANIZED HEALTH CARE EDUCATION/TRAINING PROGRAM

## 2024-09-26 PROCEDURE — 90472 IMMUNIZATION ADMIN EACH ADD: CPT | Performed by: STUDENT IN AN ORGANIZED HEALTH CARE EDUCATION/TRAINING PROGRAM

## 2024-09-26 PROCEDURE — 90656 IIV3 VACC NO PRSV 0.5 ML IM: CPT | Performed by: STUDENT IN AN ORGANIZED HEALTH CARE EDUCATION/TRAINING PROGRAM

## 2024-09-26 PROCEDURE — 90746 HEPB VACCINE 3 DOSE ADULT IM: CPT | Performed by: STUDENT IN AN ORGANIZED HEALTH CARE EDUCATION/TRAINING PROGRAM

## 2024-09-26 PROCEDURE — 90480 ADMN SARSCOV2 VAC 1/ONLY CMP: CPT | Performed by: STUDENT IN AN ORGANIZED HEALTH CARE EDUCATION/TRAINING PROGRAM

## 2024-09-26 PROCEDURE — 80061 LIPID PANEL: CPT | Performed by: STUDENT IN AN ORGANIZED HEALTH CARE EDUCATION/TRAINING PROGRAM

## 2024-09-26 PROCEDURE — 90471 IMMUNIZATION ADMIN: CPT | Performed by: STUDENT IN AN ORGANIZED HEALTH CARE EDUCATION/TRAINING PROGRAM

## 2024-09-26 PROCEDURE — 99396 PREV VISIT EST AGE 40-64: CPT | Mod: 25 | Performed by: STUDENT IN AN ORGANIZED HEALTH CARE EDUCATION/TRAINING PROGRAM

## 2024-09-26 PROCEDURE — 36415 COLL VENOUS BLD VENIPUNCTURE: CPT | Performed by: STUDENT IN AN ORGANIZED HEALTH CARE EDUCATION/TRAINING PROGRAM

## 2024-09-26 PROCEDURE — 91320 SARSCV2 VAC 30MCG TRS-SUC IM: CPT | Performed by: STUDENT IN AN ORGANIZED HEALTH CARE EDUCATION/TRAINING PROGRAM

## 2024-09-26 PROCEDURE — 99214 OFFICE O/P EST MOD 30 MIN: CPT | Mod: 25 | Performed by: STUDENT IN AN ORGANIZED HEALTH CARE EDUCATION/TRAINING PROGRAM

## 2024-09-26 NOTE — PROGRESS NOTES
{PROVIDER CHARTING PREFERENCE:223416}    hZeng Macdonald is a 44 year old, presenting for the following health issues:  Recheck Medication  {(!) Visit Details have not yet been documented.  Please enter Visit Details and then use this list to pull in documentation. (Optional):840291}  HPI     {MA/LPN/RN Pre-Provider Visit Orders- hCG/UA/Strep (Optional):107729}  {SUPERLIST (Optional):549582}  {additonal problems for provider to add (Optional):769764}    {ROS Picklists (Optional):647538}      Objective    There were no vitals taken for this visit.  There is no height or weight on file to calculate BMI.  Physical Exam   {Exam List (Optional):276042}    {Diagnostic Test Results (Optional):694374}        Signed Electronically by: MIGEL BALLARD MD  {Email feedback regarding this note to [email or web address]   :895918}

## 2024-09-26 NOTE — PATIENT INSTRUCTIONS
Prefilled Syringes Pricing #4 each (1month):  0.25mg ~$222  0.5mg ~$260  1mg ~$306  1.5mg ~$342  2mg ~$395  2.5mg ~$438      Patient Education   Preventive Care Advice   This is general advice given by our system to help you stay healthy. However, your care team may have specific advice just for you. Please talk to your care team about your preventive care needs.  Nutrition  Eat 5 or more servings of fruits and vegetables each day.  Try wheat bread, brown rice and whole grain pasta (instead of white bread, rice, and pasta).  Get enough calcium and vitamin D. Check the label on foods and aim for 100% of the RDA (recommended daily allowance).  Lifestyle  Exercise at least 150 minutes each week  (30 minutes a day, 5 days a week).  Do muscle strengthening activities 2 days a week. These help control your weight and prevent disease.  No smoking.  Wear sunscreen to prevent skin cancer.  Have a dental exam and cleaning every 6 months.  Yearly exams  See your health care team every year to talk about:  Any changes in your health.  Any medicines your care team has prescribed.  Preventive care, family planning, and ways to prevent chronic diseases.  Shots (vaccines)   HPV shots (up to age 26), if you've never had them before.  Hepatitis B shots (up to age 59), if you've never had them before.  COVID-19 shot: Get this shot when it's due.  Flu shot: Get a flu shot every year.  Tetanus shot: Get a tetanus shot every 10 years.  Pneumococcal, hepatitis A, and RSV shots: Ask your care team if you need these based on your risk.  Shingles shot (for age 50 and up)  General health tests  Diabetes screening:  Starting at age 35, Get screened for diabetes at least every 3 years.  If you are younger than age 35, ask your care team if you should be screened for diabetes.  Cholesterol test: At age 39, start having a cholesterol test every 5 years, or more often if advised.  Bone density scan (DEXA): At age 50, ask your care team if you  should have this scan for osteoporosis (brittle bones).  Hepatitis C: Get tested at least once in your life.  STIs (sexually transmitted infections)  Before age 24: Ask your care team if you should be screened for STIs.  After age 24: Get screened for STIs if you're at risk. You are at risk for STIs (including HIV) if:  You are sexually active with more than one person.  You don't use condoms every time.  You or a partner was diagnosed with a sexually transmitted infection.  If you are at risk for HIV, ask about PrEP medicine to prevent HIV.  Get tested for HIV at least once in your life, whether you are at risk for HIV or not.  Cancer screening tests  Cervical cancer screening: If you have a cervix, begin getting regular cervical cancer screening tests starting at age 21.  Breast cancer scan (mammogram): If you've ever had breasts, begin having regular mammograms starting at age 40. This is a scan to check for breast cancer.  Colon cancer screening: It is important to start screening for colon cancer at age 45.  Have a colonoscopy test every 10 years (or more often if you're at risk) Or, ask your provider about stool tests like a FIT test every year or Cologuard test every 3 years.  To learn more about your testing options, visit:   .  For help making a decision, visit:   https://bit.ly/kw93705.  Prostate cancer screening test: If you have a prostate, ask your care team if a prostate cancer screening test (PSA) at age 55 is right for you.  Lung cancer screening: If you are a current or former smoker ages 50 to 80, ask your care team if ongoing lung cancer screenings are right for you.  For informational purposes only. Not to replace the advice of your health care provider. Copyright   2023 [employer]. All rights reserved. Clinically reviewed by the [facility]. TIME PLUS Q 230901 - REV 01/24.

## 2024-09-26 NOTE — PROGRESS NOTES
Preventive Care Visit  [facility]  MIGEL BALLARD MD, Family Medicine  Sep 26, 2024      Assessment & Plan     (Z00.00) Routine general medical examination at a health care facility  (primary encounter diagnosis)  Comment: Stable  Plan: Pending labs    (I10) Essential hypertension, benign  Comment: Chronic, controlled  Plan: Comprehensive metabolic panel (BMP + Alb, Alk         Phos, ALT, AST, Total. Bili, TP), Lipid panel         reflex to direct LDL Non-fasting            (Z13.1) Screening for diabetes mellitus  Comment: Stable  Plan: Hemoglobin A1c            (Z23) Need for vaccination  Comment: Stable  Plan: HEPATITIS B, ADULT 20+ (ENGERIX-B/RECOMBIVAX         HB), INFLUENZA VACCINE,SPLIT         VIRUS,TRIVALENT,PF(FLUZONE), COVID-19 12+         (PFIZER)            (E66.01,  Z68.41) Class 3 severe obesity due to excess calories with serious comorbidity and body mass index (BMI) of 40.0 to 44.9 in adult (H)  Comment: Chronic, uncontrolled. Obesity, weight would improve  hypertension. The patient presents with Class 3 obesity, as evidenced by a BMI of 42. This condition is associated with multiple comorbidities, including [list relevant comorbidities, e.g., hypertension, dyslipidemia, type 2 diabetes], which increase the patient's risk for cardiovascular disease and metabolic complications.    The patient has attempted various weight loss strategies, including dietary changes, exercise regimens with limited success. Given the patient's current weight, medical history, and overall health status, a more intensive intervention is warranted.  After discussing the potential benefits and risks, the patient is a suitable candidate for  (semaglutide) as part of a comprehensive weight management plan. This medication has been shown to facilitate significant weight loss and improve metabolic parameters. Additionally, it may provide psychological benefits that support adherence to lifestyle  changes.  A treatment plan will be initiated, including education on medication use, lifestyle modifications, and regular follow-up to monitor progress and adjust the treatment plan as necessary.  Plan: COMPOUNDED NON-CONTROLLED SUBSTANCE (CMPD RX) -        PHARMACY TO MIX COMPOUNDED MEDICATION,         COMPOUNDED NON-CONTROLLED SUBSTANCE (CMPD RX) -        PHARMACY TO MIX COMPOUNDED MEDICATION            (N48.29) Foreskin inflammation  Comment: Chronic, uncontrolled. Pending urology referral   Plan: Adult Urology  Referral            Dictation Disclaimer: Some of this Note has been completed with voice-recognition dictation software. Although errors are generally corrected real-time, there is the potential for a rare error to be present in the completed chart.                 Counseling  Appropriate preventive services were addressed with this patient via screening, questionnaire, or discussion as appropriate for fall prevention, nutrition, physical activity, Tobacco-use cessation, social engagement, weight loss and cognition.  Checklist reviewing preventive services available has been given to the patient.  Reviewed patient's diet, addressing concerns and/or questions.   He is at risk for lack of exercise and has been provided with information to increase physical activity for the benefit of his well-being.           Zheng Macdonald is a 44 year old, presenting for the following:  Physical        9/26/2024     4:28 PM   Additional Questions   Roomed by Tia        Health Care Directive  Patient does not have a Health Care Directive or Living Will: Discussed advance care planning with patient; information given to patient to review.    HPI  Pt is a 44 year old male presenting for routine physical. He endorses that he has been struggling to lose weight. He states he has been overweight since 4th grade. He states he spoke with a friend who recommended the GLP-1 in which he is interested in. He reports  being physically active with his kids however continue to increase in weight overall. Pt reports being interested in the compound pharmacy             9/25/2024   General Health   How would you rate your overall physical health? (!) FAIR   Feel stress (tense, anxious, or unable to sleep) Not at all            9/25/2024   Nutrition   Three or more servings of calcium each day? Yes   Diet: Vegetarian/vegan   How many servings of fruit and vegetables per day? 4 or more   How many sweetened beverages each day? 0-1            9/25/2024   Exercise   Days per week of moderate/strenous exercise 3 days   Average minutes spent exercising at this level 40 min            9/25/2024   Social Factors   Frequency of gathering with friends or relatives Three times a week   Worry food won't last until get money to buy more No   Food not last or not have enough money for food? No   Do you have housing? (Housing is defined as stable permanent housing and does not include staying ouside in a car, in a tent, in an abandoned building, in an overnight shelter, or couch-surfing.) Yes   Are you worried about losing your housing? No   Lack of transportation? No   Unable to get utilities (heat,electricity)? No            9/25/2024   Dental   Dentist two times every year? Yes            9/25/2024   TB Screening   Were you born outside of the [place]? No            Today's PHQ-2 Score:       9/25/2024    10:58 AM   PHQ-2 ( 1999 [employer])   Q1: Little interest or pleasure in doing things 0   Q2: Feeling down, depressed or hopeless 0   PHQ-2 Score 0   Q1: Little interest or pleasure in doing things Not at all   Q2: Feeling down, depressed or hopeless Not at all   PHQ-2 Score 0           9/25/2024   Substance Use   Alcohol more than 3/day or more than 7/wk No   Do you use any other substances recreationally? No        Social History     Tobacco Use    Smoking status: Never     Passive exposure: Never    Smokeless tobacco: Never   Vaping Use    Vaping  "status: Never Used   Substance Use Topics    Alcohol use: Yes     Comment: 4 per week    Drug use: Yes     Types: Marijuana           9/25/2024   STI Screening   New sexual partner(s) since last STI/HIV test? No      ASCVD Risk   The 10-year ASCVD risk score (Itz VALENZUELA, et al., 2019) is: 1.8%    Values used to calculate the score:      Age: 44 years      Sex: Male      Is Non- : No      Diabetic: No      Tobacco smoker: No      Systolic Blood Pressure: 134 mmHg      Is BP treated: Yes      HDL Cholesterol: 41 mg/dL      Total Cholesterol: 173 mg/dL       Reviewed and updated as needed this visit by Provider                    Lab work is in process  Labs reviewed in EPIC     ROS: 10 point ROS neg other than the symptoms noted above in the HPI.       Objective    Exam  /84   Pulse 77   Temp 97.2  F (36.2  C) (Temporal)   Resp 18   Ht 1.75 m (5' 8.9\")   Wt 130.5 kg (287 lb 12.8 oz)   SpO2 97%   BMI 42.63 kg/m     Estimated body mass index is 42.63 kg/m  as calculated from the following:    Height as of this encounter: 1.75 m (5' 8.9\").    Weight as of this encounter: 130.5 kg (287 lb 12.8 oz).    Physical Exam  GENERAL: healthy, alert and no distress  EYES: Eyes grossly normal to inspection, PERRL and conjunctivae and sclerae normal  Neck: No visible JVD or lymphadenopathy   RESP: symmetrical rise in chest   CV: No peripheral edema notable   MS: no gross musculoskeletal defects noted  SKIN: no suspicious lesions or rashes  PSYCH: mentation appears normal, affect normal/bright          Signed Electronically by: MIGEL BALLARD MD    "

## 2024-09-27 ENCOUNTER — TELEPHONE (OUTPATIENT)
Dept: UROLOGY | Facility: CLINIC | Age: 44
End: 2024-09-27
Payer: COMMERCIAL

## 2024-09-27 LAB
ALBUMIN SERPL BCG-MCNC: 4.5 G/DL (ref 3.5–5.2)
ALP SERPL-CCNC: 67 U/L (ref 40–150)
ALT SERPL W P-5'-P-CCNC: 51 U/L (ref 0–70)
ANION GAP SERPL CALCULATED.3IONS-SCNC: 9 MMOL/L (ref 7–15)
AST SERPL W P-5'-P-CCNC: 29 U/L (ref 0–45)
BILIRUB SERPL-MCNC: 0.3 MG/DL
BUN SERPL-MCNC: 17.3 MG/DL (ref 6–20)
CALCIUM SERPL-MCNC: 9.1 MG/DL (ref 8.8–10.4)
CHLORIDE SERPL-SCNC: 105 MMOL/L (ref 98–107)
CHOLEST SERPL-MCNC: 207 MG/DL
CREAT SERPL-MCNC: 0.8 MG/DL (ref 0.67–1.17)
EGFRCR SERPLBLD CKD-EPI 2021: >90 ML/MIN/1.73M2
FASTING STATUS PATIENT QL REPORTED: ABNORMAL
FASTING STATUS PATIENT QL REPORTED: NORMAL
GLUCOSE SERPL-MCNC: 86 MG/DL (ref 70–99)
HCO3 SERPL-SCNC: 24 MMOL/L (ref 22–29)
HDLC SERPL-MCNC: 33 MG/DL
LDLC SERPL CALC-MCNC: 136 MG/DL
NONHDLC SERPL-MCNC: 174 MG/DL
POTASSIUM SERPL-SCNC: 3.9 MMOL/L (ref 3.4–5.3)
PROT SERPL-MCNC: 7.2 G/DL (ref 6.4–8.3)
SODIUM SERPL-SCNC: 138 MMOL/L (ref 135–145)
TRIGL SERPL-MCNC: 188 MG/DL

## 2024-09-27 NOTE — TELEPHONE ENCOUNTER
This encounter is being sent to inform the clinic that this patient has a referral from Malini Young MD in [facility] for the diagnoses of Foreskin inflammation; Concern for changes in foreskin and has requested that this patient be seen within Priority: 1-2 Weeks and/or with Priority: 1-2 Weeks.  Based on the availability of our provider(s), we are unable to accommodate this request.    Were all sites offered this patient?  Yes  [facility] preferred  Does scheduling algorithm request to schedule next available?  Patient appointment has not been scheduled.  Please review the referral request for accommodation and contact the patient.  If unable to accommodate, please resubmit a referral and indicate a preferred partner or affiliate location using Provider Finder or Scheduling Instructions field.       Dx not in protocols     Pt had a Vasectomy last week    Referral Order in Epic  Records in Epic  No outside Records    Please review and call Pt to schedule    Thank you!

## 2024-11-19 ENCOUNTER — OFFICE VISIT (OUTPATIENT)
Dept: UROLOGY | Facility: CLINIC | Age: 44
End: 2024-11-19
Payer: COMMERCIAL

## 2024-11-19 VITALS
SYSTOLIC BLOOD PRESSURE: 138 MMHG | OXYGEN SATURATION: 96 % | DIASTOLIC BLOOD PRESSURE: 86 MMHG | WEIGHT: 271.6 LBS | HEART RATE: 97 BPM | BODY MASS INDEX: 40.23 KG/M2

## 2024-11-19 DIAGNOSIS — N47.8 REDUNDANT PREPUCE AND PHIMOSIS: Primary | ICD-10-CM

## 2024-11-19 DIAGNOSIS — N47.1 REDUNDANT PREPUCE AND PHIMOSIS: Primary | ICD-10-CM

## 2024-11-19 RX ORDER — CLOTRIMAZOLE AND BETAMETHASONE DIPROPIONATE 10; .64 MG/G; MG/G
CREAM TOPICAL 2 TIMES DAILY
Qty: 15 G | Refills: 1 | Status: SHIPPED | OUTPATIENT
Start: 2024-11-19

## 2024-11-19 NOTE — PROGRESS NOTES
Chief Complaint   Patient presents with    SEBASTIEN       Torres Cuevas is a 44 year old male who presents today for follow up of   Chief Complaint   Patient presents with    SEBASTIEN   Patient is a pleasant 44-year-old male with complaints of difficulty with retracting his foreskin.  He has no urinary problems.  He is not a diabetic.  However patient is obese.  Lately he has been able to retract his foreskin with some difficulties.    Current Outpatient Medications   Medication Sig Dispense Refill    clotrimazole-betamethasone (LOTRISONE) 1-0.05 % external cream Apply topically 2 times daily. 15 g 1    COMPOUNDED NON-CONTROLLED SUBSTANCE (CMPD RX) - PHARMACY TO MIX COMPOUNDED MEDICATION Compounded semaglutide 0.25 mg subcutaneous injection once weekly 4 each 0    COMPOUNDED NON-CONTROLLED SUBSTANCE (CMPD RX) - PHARMACY TO MIX COMPOUNDED MEDICATION Compounded semaglutide 0.5 mg subcutaneous injection once weekly 4 each 0    fexofenadine (ALLEGRA) 180 MG tablet Take 180 mg by mouth daily as needed for allergies.      lisinopril (ZESTRIL) 20 MG tablet TAKE ONE TABLET BY MOUTH ONCE DAILY 90 tablet 2    omeprazole (PRILOSEC) 40 MG DR capsule Take 1 capsule (40 mg) by mouth daily 90 capsule 1    [facility] 0     Allergies   Allergen Reactions    Seasonal Allergies       Past Medical History:   Diagnosis Date    Hypertension ?    Seasonal allergic rhinitis      Past Surgical History:   Procedure Laterality Date    NO HISTORY OF SURGERY       Family History   Problem Relation Age of Onset    Liver Disease Paternal Grandfather         alcohol related most likely    Myocardial Infarction Maternal Grandfather         in his 60s    Coronary Artery Disease Maternal Uncle     Diabetes No family hx of     Colon Cancer No family hx of      Social History     Socioeconomic History    Marital status:      Spouse name: None    Number of children: None    Years of education: None    Highest  education level: None   Occupational History    Occupation: Pharmacy Tech, prepares chemo. [employer]     Employer: [employer]     Comment: works  as a [employer] as well   Tobacco Use    Smoking status: Never     Passive exposure: Never    Smokeless tobacco: Never   Vaping Use    Vaping status: Never Used   Substance and Sexual Activity    Alcohol use: Yes     Comment: 4 per week    Drug use: Yes     Types: Marijuana    Sexual activity: Yes     Partners: Female     Birth control/protection: I.U.D.   Other Topics Concern    Parent/sibling w/ CABG, MI or angioplasty before 65F 55M? Yes     Comment: Father had 2 stents put in     Social Drivers of Health     Financial Resource Strain: Low Risk  (9/25/2024)    Financial Resource Strain     Within the past 12 months, have you or your family members you live with been unable to get utilities (heat, electricity) when it was really needed?: No   Food Insecurity: Low Risk  (9/25/2024)    Food Insecurity     Within the past 12 months, did you worry that your food would run out before you got money to buy more?: No     Within the past 12 months, did the food you bought just not last and you didn t have money to get more?: No   Transportation Needs: Low Risk  (9/25/2024)    Transportation Needs     Within the past 12 months, has lack of transportation kept you from medical appointments, getting your medicines, non-medical meetings or appointments, work, or from getting things that you need?: No   Physical Activity: Insufficiently Active (9/25/2024)    Exercise Vital Sign     Days of Exercise per Week: 3 days     Minutes of Exercise per Session: 40 min   Stress: No Stress Concern Present (9/25/2024)    [employer] - Occupational Stress Questionnaire     Feeling of Stress : Not at all   Social Connections: Unknown (9/25/2024)    Social Connection and Isolation Panel [NHANES]     Frequency of Social Gatherings with Friends and Family:  Three times a week   Interpersonal Safety: Low Risk  (9/26/2024)    Interpersonal Safety     Do you feel physically and emotionally safe where you currently live?: Yes     Within the past 12 months, have you been hit, slapped, kicked or otherwise physically hurt by someone?: No     Within the past 12 months, have you been humiliated or emotionally abused in other ways by your partner or ex-partner?: No   Housing Stability: Low Risk  (9/25/2024)    Housing Stability     Do you have housing? : Yes     Are you worried about losing your housing?: No       REVIEW OF SYSTEMS  =================  C: NEGATIVE for fever, chills, change in weight  I: NEGATIVE for worrisome rashes, moles or lesions  E/M: NEGATIVE for ear, mouth and throat problems  R: NEGATIVE for significant cough or SHORTNESS OF BREATH  CV:  NEGATIVE for chest pain, palpitations or peripheral edema  GI: NEGATIVE for nausea, abdominal pain, heartburn, or change in bowel habits  NEURO: NEGATIVE numbness/weakness  : see HPI  PSYCH: NEGATIVE depression/anxiety  LYmph: no new enlarged lymph nodes  Ortho: no new trauma/movements    Physical Exam:  /86   Pulse 97   Wt 123.2 kg (271 lb 9.6 oz)   SpO2 96%   BMI 40.23 kg/m     Patient is pleasant, in no acute distress, good general condition.  Lung: no evidence of respiratory distress    Abdomen: Soft, nondistended, non tender. No masses. No rebound or guarding.   Exam: Penis with mild phimosis.  Testes no masses.  No scrotal skin lesion.  Skin: Warm and dry.  No redness.  Psych: normal mood and affect  Neuro: alert and oriented  Musculaskeletal: moving all extremities    Assessment/Plan:   (N47.8,  N47.1) Redundant prepuce and phimosis  (primary encounter diagnosis)  Comment:    Plan: clotrimazole-betamethasone (LOTRISONE) 1-0.05 %        external cream          Discussed circumcision if treatment not effective.  Avoid aggressive retracting of the foreskin.  Discussed personal hygiene.          Please  note: Voice recognition software was used in this dictation.  It may therefore contain typographical errors.

## 2025-01-08 ENCOUNTER — OFFICE VISIT (OUTPATIENT)
Dept: FAMILY MEDICINE | Facility: CLINIC | Age: 45
End: 2025-01-08
Payer: COMMERCIAL

## 2025-01-08 VITALS
DIASTOLIC BLOOD PRESSURE: 82 MMHG | OXYGEN SATURATION: 97 % | BODY MASS INDEX: 40.05 KG/M2 | WEIGHT: 270.4 LBS | SYSTOLIC BLOOD PRESSURE: 120 MMHG | HEART RATE: 79 BPM | TEMPERATURE: 97.2 F | RESPIRATION RATE: 20 BRPM

## 2025-01-08 DIAGNOSIS — E66.813 CLASS 3 SEVERE OBESITY DUE TO EXCESS CALORIES WITH SERIOUS COMORBIDITY AND BODY MASS INDEX (BMI) OF 40.0 TO 44.9 IN ADULT (H): Primary | ICD-10-CM

## 2025-01-08 DIAGNOSIS — E66.01 CLASS 3 SEVERE OBESITY DUE TO EXCESS CALORIES WITH SERIOUS COMORBIDITY AND BODY MASS INDEX (BMI) OF 40.0 TO 44.9 IN ADULT (H): Primary | ICD-10-CM

## 2025-01-08 PROCEDURE — 99214 OFFICE O/P EST MOD 30 MIN: CPT | Performed by: STUDENT IN AN ORGANIZED HEALTH CARE EDUCATION/TRAINING PROGRAM

## 2025-01-08 PROCEDURE — G2211 COMPLEX E/M VISIT ADD ON: HCPCS | Performed by: STUDENT IN AN ORGANIZED HEALTH CARE EDUCATION/TRAINING PROGRAM

## 2025-01-08 ASSESSMENT — PAIN SCALES - GENERAL: PAINLEVEL_OUTOF10: NO PAIN (0)

## 2025-01-08 NOTE — PROGRESS NOTES
"  Assessment & Plan     (E66.813,  E66.01,  Z68.41) Class 3 severe obesity due to excess calories with serious comorbidity and body mass index (BMI) of 40.0 to 44.9 in adult (H)  (primary encounter diagnosis)  Comment: Chronic, improving.  Patient tolerating semaglutide adequately and will increase from 1 mg to 2.5 mg dose and sent to the compound pharmacy.  Will send 6-month prescription and patient to follow-up in June or July of this year to determine if dose is adequate and if weight loss is consistently occurring.  Patient encouraged to continue with increasing protein in his diet as tolerated.  Plan: COMPOUNDED NON-CONTROLLED SUBSTANCE (CMPD RX) -        PHARMACY TO MIX COMPOUNDED MEDICATION            Dictation Disclaimer: Some of this Note has been completed with voice-recognition dictation software. Although errors are generally corrected real-time, there is the potential for a rare error to be present in the completed chart.     The longitudinal plan of care for the diagnosis(es)/condition(s) as documented were addressed during this visit. Due to the added complexity in care, I will continue to support Torres in the subsequent management and with ongoing continuity of care.            BMI  Estimated body mass index is 40.05 kg/m  as calculated from the following:    Height as of 9/26/24: 1.75 m (5' 8.9\").    Weight as of this encounter: 122.7 kg (270 lb 6.4 oz).   Weight management plan: Specific weight management program called semaglutide  discussed          Subjective   Torres is a 44 year old, presenting for the following health issues:  Recheck Medication        1/8/2025     7:57 AM   Additional Questions   Roomed by Jazmin   Accompanied by self         1/8/2025     7:57 AM   Patient Reported Additional Medications   Patient reports taking the following new medications none     History of Present Illness       Reason for visit:  Glp1 follow up    He eats 4 or more servings of fruits and vegetables " daily.He consumes 0 sweetened beverage(s) daily.He exercises with enough effort to increase his heart rate 20 to 29 minutes per day.  He exercises with enough effort to increase his heart rate 4 days per week. He is missing 1 dose(s) of medications per week.     Patient is a 44-year-old male presenting for follow-up for the use of semaglutide for weight loss.  He reports a 20 pound weight loss since starting in September with the weight currently at 265.  He reports that the last month of December was little bit challenging due to his work requirements however he is feeling better about his overall health.  He endorses wanting to go up to the maintenance dose of 2.5 mg as he is currently on the 1 point milligram dose and is feeling that things are leveling off.  He reports being engaged in exercises including pickleball and a more balanced diet with the increase in protein levels. patient would like to continue    Medication Followup of Wegovy  Taking Medication as prescribed: yes  Side Effects:  None  Medication Helping Symptoms:  yes       ROS: 10 point ROS neg other than the symptoms noted above in the HPI.        Objective    /82 (BP Location: Left arm, Patient Position: Sitting, Cuff Size: Adult Large)   Pulse 79   Temp 97.2  F (36.2  C) (Temporal)   Resp 20   Wt 122.7 kg (270 lb 6.4 oz)   SpO2 97%   BMI 40.05 kg/m    Body mass index is 40.05 kg/m .  Physical Exam   GENERAL: healthy, alert and no distress  EYES: Eyes grossly normal to inspection, PERRL and conjunctivae and sclerae normal  Neck: No visible JVD or lymphadenopathy   RESP: symmetrical rise in chest   CV: No peripheral edema notable   MS: no gross musculoskeletal defects noted  SKIN: no suspicious lesions or rashes  PSYCH: mentation appears normal, affect normal/bright              Signed Electronically by: MIGEL BALLARD MD

## 2025-02-11 ENCOUNTER — LAB (OUTPATIENT)
Dept: LAB | Facility: CLINIC | Age: 45
End: 2025-02-11
Payer: COMMERCIAL

## 2025-02-11 DIAGNOSIS — Z30.2 ENCOUNTER FOR STERILIZATION: ICD-10-CM

## 2025-02-11 LAB — SEMEN ANALYSIS P VAS PNL: NORMAL

## 2025-02-11 PROCEDURE — 89321 SEMEN ANAL SPERM DETECTION: CPT

## 2025-02-20 ENCOUNTER — MYC MEDICAL ADVICE (OUTPATIENT)
Dept: FAMILY MEDICINE | Facility: CLINIC | Age: 45
End: 2025-02-20
Payer: COMMERCIAL

## 2025-02-20 NOTE — TELEPHONE ENCOUNTER
Yeah- lets chat virtually so I can provide more details on the differences between the two    [facility] [place]

## 2025-02-25 ENCOUNTER — LAB (OUTPATIENT)
Dept: LAB | Facility: CLINIC | Age: 45
End: 2025-02-25
Payer: COMMERCIAL

## 2025-02-25 ENCOUNTER — VIRTUAL VISIT (OUTPATIENT)
Dept: FAMILY MEDICINE | Facility: CLINIC | Age: 45
End: 2025-02-25
Payer: COMMERCIAL

## 2025-02-25 DIAGNOSIS — G47.33 OSA (OBSTRUCTIVE SLEEP APNEA): Chronic | ICD-10-CM

## 2025-02-25 DIAGNOSIS — I10 ESSENTIAL HYPERTENSION, BENIGN: Primary | ICD-10-CM

## 2025-02-25 DIAGNOSIS — Z30.2 ENCOUNTER FOR STERILIZATION: ICD-10-CM

## 2025-02-25 DIAGNOSIS — K29.00 OTHER ACUTE GASTRITIS WITHOUT HEMORRHAGE: ICD-10-CM

## 2025-02-25 DIAGNOSIS — E66.01 CLASS 3 SEVERE OBESITY DUE TO EXCESS CALORIES WITH SERIOUS COMORBIDITY AND BODY MASS INDEX (BMI) OF 40.0 TO 44.9 IN ADULT (H): ICD-10-CM

## 2025-02-25 DIAGNOSIS — E66.813 CLASS 3 SEVERE OBESITY DUE TO EXCESS CALORIES WITH SERIOUS COMORBIDITY AND BODY MASS INDEX (BMI) OF 40.0 TO 44.9 IN ADULT (H): ICD-10-CM

## 2025-02-25 LAB — SEMEN ANALYSIS P VAS PNL: NORMAL

## 2025-02-25 PROCEDURE — 89321 SEMEN ANAL SPERM DETECTION: CPT

## 2025-02-25 PROCEDURE — 98006 SYNCH AUDIO-VIDEO EST MOD 30: CPT | Performed by: STUDENT IN AN ORGANIZED HEALTH CARE EDUCATION/TRAINING PROGRAM

## 2025-02-25 RX ORDER — OMEPRAZOLE 40 MG/1
40 CAPSULE, DELAYED RELEASE ORAL DAILY
Qty: 90 CAPSULE | Refills: 1 | Status: SHIPPED | OUTPATIENT
Start: 2025-02-25

## 2025-02-25 RX ORDER — TIRZEPATIDE 5 MG/.5ML
5 INJECTION, SOLUTION SUBCUTANEOUS
Qty: 2 ML | Refills: 0 | Status: SHIPPED | OUTPATIENT
Start: 2025-02-25

## 2025-02-25 NOTE — PROGRESS NOTES
Torres is a 44 year old who is being evaluated via a billable video visit.    How would you like to obtain your AVS? MyChart  If the video visit is dropped, the invitation should be resent by: Text to cell phone: [phone]  Will anyone else be joining your video visit? No      Assessment & Plan     (I10) Essential hypertension, benign  (primary encounter diagnosis)  Comment: Chronic, stable. Pt to benefit with improved hypertension with the use if zepbound as improved weight  loss up to 20% will benefit pt's blood pressure numbers over time.   Plan: ZEPBOUND 5 MG/0.5ML prefilled pen            (E66.813,  E66.01,  Z68.41) Class 3 severe obesity due to excess calories with serious comorbidity and body mass index (BMI) of 40.0 to 44.9 in adult (H)  Comment: Chronic, uncontrolled. The patient presents with Class 3 obesity, as evidenced by a BMI of 42. This condition is associated with multiple comorbidities, including  hypertension,  and CHRISTOPH which increase the patient's risk for cardiovascular disease and metabolic complications.  The patient has attempted various weight loss strategies, including dietary changes, exercise regimens, other medications with limited success. Given the patient's current weight, medical history, and overall health status, a more intensive intervention is warranted.  After discussing the potential benefits and risks, the patient is a suitable candidate for Zepbound  (trizepatide) as part of a comprehensive weight management plan. Patient has tolerated Semaglutide for months and due to costs will benefit from insurance managing his improved weight loss. Pt is an avid pickle ball player and engages in physical activity weekly up to 5 hours.   A treatment plan will be initiated, including education on medication use, lifestyle modifications, and regular follow-up to monitor progress and adjust the treatment plan as necessary.    Plan: ZEPBOUND 5 MG/0.5ML prefilled pen            (G47.33) CHRISTOPH  (obstructive sleep apnea)- mild (AHI 10)  Comment: Chronic, stable  Plan: ZEPBOUND 5 MG/0.5ML prefilled pen            The longitudinal plan of care for the diagnosis(es)/condition(s) as documented were addressed during this visit. Due to the added complexity in care, I will continue to support Torres in the subsequent management and with ongoing continuity of care.                  Zheng Macdonald is a 44 year old, presenting for the following health issues:  Weight Loss    History of Present Illness       Reason for visit:  Talk about GLP-1 optionsHe consumes 0 sweetened beverage(s) daily. He exercises with enough effort to increase his heart rate 4 days per week. He is missing 1 dose(s) of medications per week.  He is not taking prescribed medications regularly due to remembering to take.     History of Present Illness  Torres Cuevas is a 44 year old male with obesity, hypertension, and sleep apnea who presents for a consultation regarding GLP-1 medication options for weight management.    He has been using semaglutide for weight management and has experienced significant weight loss. However, his weight has plateaued, prompting interest in exploring other options such as tirzepatide due to its potential for greater weight loss and lower side effects. He is currently on the maximum dose of semaglutide and has experienced no side effects. He has two or three doses left, which he plans to use while transitioning to a new medication.    He is physically active, playing pickleball for five to six hours a week. He believes this activity may be increasing his hunger levels, as he feels 'really hungry' after playing for extended periods. This suggests that his caloric expenditure may be affecting the efficacy of his current medication.    His insurance would cover tirzepatide if linked to a specific diagnosis such as obesity, hypertension, or sleep apnea, all of which he has. His current out-of-pocket cost for  semaglutide is $340 per month, and he is interested in reducing this cost to $75 per month with insurance coverage for tirzepatide.                  Objective           Vitals:  No vitals were obtained today due to virtual visit.    Physical Exam   GENERAL: alert and no distress  EYES: Eyes grossly normal to inspection.  No discharge or erythema, or obvious scleral/conjunctival abnormalities.  RESP: No audible wheeze, cough, or visible cyanosis.    SKIN: Visible skin clear. No significant rash, abnormal pigmentation or lesions.  NEURO: Cranial nerves grossly intact.  Mentation and speech appropriate for age.  PSYCH: Appropriate affect, tone, and pace of words          Video-Visit Details    Type of service:  Video Visit   Originating Location (pt. Location): Home    Distant Location (provider location):  On-site  Platform used for Video Visit: Armando  Signed Electronically by: MIGEL BALLARD MD

## 2025-02-25 NOTE — PATIENT INSTRUCTIONS
Options for continuing GLP1/GIP agonist in 2025:  Pay out of pocket for Wegovy or Zepbound pens (>$1000/month cash price without savings card)   Zepbound cost with Zepbound savings card (link below to sign up for this): $650/month with card  https://[email or web address].zepbound.[employer].com/enroll/checkEnrollment  Wegovy cost with Wegovy savings card (link below to sign up for this): $650/month with card   https://www.[employer]/coverage-and-savings/save-on-wegovy.html  Compounded semaglutide (same active ingredient as Wegovy) from [facility] ($230/month for doses of 1mg or less; $370/month for doses higher than 1mg)  This is an available option for as long as Wegovy is on FDA shortage list, Wegovy has been on the list for the last several months with no foreseeable end in sight as of now   Zepbound Vials through [facility] - vials only available in 2.5 mg and 5 mg doses  $399/month for 2.5mg vials  $549/month for 5mg vials   $5 per month for administrations supplies (syringe/needles, etc)

## 2025-03-20 ENCOUNTER — MYC MEDICAL ADVICE (OUTPATIENT)
Dept: FAMILY MEDICINE | Facility: CLINIC | Age: 45
End: 2025-03-20
Payer: COMMERCIAL

## 2025-03-20 DIAGNOSIS — E66.01 CLASS 3 SEVERE OBESITY DUE TO EXCESS CALORIES WITH SERIOUS COMORBIDITY AND BODY MASS INDEX (BMI) OF 40.0 TO 44.9 IN ADULT (H): Primary | ICD-10-CM

## 2025-03-20 DIAGNOSIS — E66.813 CLASS 3 SEVERE OBESITY DUE TO EXCESS CALORIES WITH SERIOUS COMORBIDITY AND BODY MASS INDEX (BMI) OF 40.0 TO 44.9 IN ADULT (H): Primary | ICD-10-CM

## 2025-04-09 DIAGNOSIS — I10 ESSENTIAL HYPERTENSION, BENIGN: ICD-10-CM

## 2025-04-09 RX ORDER — LISINOPRIL 20 MG/1
20 TABLET ORAL DAILY
Qty: 90 TABLET | Refills: 2 | Status: SHIPPED | OUTPATIENT
Start: 2025-04-09

## 2025-05-09 NOTE — TELEPHONE ENCOUNTER
----- Message from Dr. Jg Bermudez MD sent at 5/9/2025  1:02 PM EDT -----  They are going to stop compounding this med  So it will be unavailable after may 20 th  ----- Message -----  From: Milagro Newby MA  Sent: 5/9/2025  12:32 PM EDT  To: Jg Bermudez MD    Hello- I was advised from the [facility] compounding pharmacy that I go to that after May 20th, my physician will have to call the pharmacy for dosage change in order to get around new restrictions. Just giving a heads up, Thank you so much   Called patient and he stated that he is at work now and unable to talk.  He will call us back later when he is not at work.    Zee Block RN C[facility] Triage.

## 2025-07-10 ENCOUNTER — OFFICE VISIT (OUTPATIENT)
Dept: FAMILY MEDICINE | Facility: CLINIC | Age: 45
End: 2025-07-10
Payer: COMMERCIAL

## 2025-07-10 VITALS
HEART RATE: 74 BPM | SYSTOLIC BLOOD PRESSURE: 120 MMHG | HEIGHT: 69 IN | RESPIRATION RATE: 18 BRPM | BODY MASS INDEX: 37.09 KG/M2 | OXYGEN SATURATION: 95 % | TEMPERATURE: 97.8 F | WEIGHT: 250.4 LBS | DIASTOLIC BLOOD PRESSURE: 81 MMHG

## 2025-07-10 DIAGNOSIS — M25.562 CHRONIC PAIN OF BOTH KNEES: ICD-10-CM

## 2025-07-10 DIAGNOSIS — L84 CALLUS OF TOE: ICD-10-CM

## 2025-07-10 DIAGNOSIS — G89.29 CHRONIC PAIN OF BOTH KNEES: ICD-10-CM

## 2025-07-10 DIAGNOSIS — M25.561 CHRONIC PAIN OF BOTH KNEES: ICD-10-CM

## 2025-07-10 DIAGNOSIS — E66.01 CLASS 2 SEVERE OBESITY DUE TO EXCESS CALORIES WITH SERIOUS COMORBIDITY AND BODY MASS INDEX (BMI) OF 36.0 TO 36.9 IN ADULT (H): Primary | ICD-10-CM

## 2025-07-10 DIAGNOSIS — E66.812 CLASS 2 SEVERE OBESITY DUE TO EXCESS CALORIES WITH SERIOUS COMORBIDITY AND BODY MASS INDEX (BMI) OF 36.0 TO 36.9 IN ADULT (H): Primary | ICD-10-CM

## 2025-07-10 NOTE — PROGRESS NOTES
Assessment & Plan     (E66.812,  E66.01,  Z68.36) Class 2 severe obesity due to excess calories with serious comorbidity and body mass index (BMI) of 36.0 to 36.9 in adult (H)  (primary encounter diagnosis)  Comment: Chronic, improving. Significant weight loss achieved, reducing BMI from 40 to 36, with further reduction anticipated. Zepbound has been effective in managing weight and reducing food cravings.  Plan: Continue Zepbound with consideration for microdosing to extend supply. Transition to Wagovy if necessary, depending on insurance coverage changes. Monitor insurance options and adjust medication plan accordingly.    (M25.561,  M25.562,  G89.29) Chronic pain of both knees  Comment: Chronic, uncontrolled. Chronic knee pain exacerbated by physical activity, such as playing pickleball. Weight loss has contributed positively to knee health.  Initiate physical therapy to strengthen surrounding muscles and ligaments.  Plan: Physical Therapy  Referral         Consider sports medicine consultation if physical therapy is insufficient, with potential for steroid injections or platelet-rich plasma therapy.    (L84) Callus of toe  Comment: Chronic, uncontrolled. Persistent callus on the right big toe, recurring despite pedicures.  Referral to podiatry for further evaluation and management.  Plan: Orthopedic  Referral              I am utilizing AI dictation through [employer] to document the patient's history and physical examination. Please note that while the AI is designed to assist in capturing detailed information, there may be errors in the dictation. I will review and edit the content for accuracy before finalizing.      The longitudinal plan of care for the diagnosis(es)/condition(s) as documented were addressed during this visit. Due to the added complexity in care, I will continue to support Torres in the subsequent management and with ongoing continuity of care.        BMI  Estimated body mass  "index is 36.67 kg/m  as calculated from the following:    Height as of this encounter: 1.76 m (5' 9.29\").    Weight as of this encounter: 113.6 kg (250 lb 6.4 oz).   Weight management plan: Specific weight management program called zepbound discussed        Subjective   Torres is a 45 year old, presenting for the following health issues:  Recheck Medication    History of Present Illness       Reason for visit:  Follow up    He eats 2-3 servings of fruits and vegetables daily.He consumes 0 sweetened beverage(s) daily.He exercises with enough effort to increase his heart rate 20 to 29 minutes per day.  He exercises with enough effort to increase his heart rate 4 days per week. He is missing 1 dose(s) of medications per week.  He is not taking prescribed medications regularly due to other.      History of Present Illness-  Torres Cuevas, 45 years    Obesity and Weight Management  Reported significant weight loss since starting Zepbound, with BMI decreasing from approximately 40 two years ago to 36-35 currently, and weight decreasing from 288 lbs to 247 lbs. Noted substantial improvement in appetite control, with loss of food cravings and reduced meal frequency, typically eating first meal around 1 PM and no desire to snack. Previously used compounded semaglutide, which plateaued in effect at 268 lbs, but experienced further weight loss and better results after switching to Zepbound. No adverse symptoms related to Zepbound reported. Has six syringes remaining and plans to adjust dosing frequency due to upcoming insurance changes. No mention of side effects or intolerance to current therapy.    Knee Discomfort  For the past 3 to 4 years, has experienced intermittent knee discomfort, particularly after playing pickleball or walking down stairs. Describes soreness the day after activity, with improvement by the following day. Noted favoring the knee and modifying gait when descending stairs, especially in the mornings. No " "report of acute injury, instability, or swelling. No prior physical therapy or interventions for the knee. No mention of pain at rest or functional limitation outside of post-activity soreness.    Chronic Callus on Right Great Toe  Has had a persistent, large callus on the right big toe for approximately 20 years. The callus becomes cracked and enlarged, recurs despite regular pedicures and professional care, and always returns to the same appearance. No mention of pain, infection, or other associated symptoms. No prior podiatric evaluation.    Misc  No other symptoms or concerns reported. No mention of fever, cough, shortness of breath, chest pain, or other systemic complaints.           ROS: 10 point ROS neg other than the symptoms noted above in the HPI.        Objective    /81   Pulse 74   Temp 97.8  F (36.6  C) (Temporal)   Resp 18   Ht 1.76 m (5' 9.29\")   Wt 113.6 kg (250 lb 6.4 oz)   SpO2 95%   BMI 36.67 kg/m    Body mass index is 36.67 kg/m .  Physical Exam   GENERAL: healthy, alert and no distress  EYES: Eyes grossly normal to inspection, PERRL and conjunctivae and sclerae normal  Neck: No visible JVD or lymphadenopathy   RESP: symmetrical rise in chest   CV: No peripheral edema notable   MS: no gross musculoskeletal defects noted  SKIN: no suspicious lesions or rashes  PSYCH: mentation appears normal, affect normal/bright              Signed Electronically by: MIGEL BALLARD MD    "

## 2025-08-06 ENCOUNTER — THERAPY VISIT (OUTPATIENT)
Dept: PHYSICAL THERAPY | Facility: CLINIC | Age: 45
End: 2025-08-06
Attending: STUDENT IN AN ORGANIZED HEALTH CARE EDUCATION/TRAINING PROGRAM
Payer: COMMERCIAL

## 2025-08-06 DIAGNOSIS — M25.561 CHRONIC PAIN OF BOTH KNEES: ICD-10-CM

## 2025-08-06 DIAGNOSIS — M25.562 CHRONIC PAIN OF BOTH KNEES: ICD-10-CM

## 2025-08-06 DIAGNOSIS — G89.29 CHRONIC PAIN OF BOTH KNEES: ICD-10-CM

## 2025-08-06 PROCEDURE — 97530 THERAPEUTIC ACTIVITIES: CPT | Mod: GP | Performed by: PHYSICAL THERAPIST

## 2025-08-06 PROCEDURE — 97161 PT EVAL LOW COMPLEX 20 MIN: CPT | Mod: GP | Performed by: PHYSICAL THERAPIST

## 2025-08-06 PROCEDURE — 97110 THERAPEUTIC EXERCISES: CPT | Mod: GP | Performed by: PHYSICAL THERAPIST

## 2025-08-06 ASSESSMENT — ACTIVITIES OF DAILY LIVING (ADL)
SIT WITH YOUR KNEE BENT: ACTIVITY IS NOT DIFFICULT
KNEEL ON THE FRONT OF YOUR KNEE: ACTIVITY IS SOMEWHAT DIFFICULT
HOW_WOULD_YOU_RATE_THE_CURRENT_FUNCTION_OF_YOUR_KNEE_DURING_YOUR_USUAL_DAILY_ACTIVITIES_ON_A_SCALE_FROM_0_TO_100_WITH_100_BEING_YOUR_LEVEL_OF_KNEE_FUNCTION_PRIOR_TO_YOUR_INJURY_AND_0_BEING_THE_INABILITY_TO_PERFORM_ANY_OF_YOUR_USUAL_DAILY_ACTIVITIES?: 80
WEAKNESS: THE SYMPTOM AFFECTS MY ACTIVITY SLIGHTLY
SWELLING: I DO NOT HAVE THE SYMPTOM
SQUAT: ACTIVITY IS SOMEWHAT DIFFICULT
PAIN: I HAVE THE SYMPTOM BUT IT DOES NOT AFFECT MY ACTIVITY
HOW_WOULD_YOU_RATE_THE_OVERALL_FUNCTION_OF_YOUR_KNEE_DURING_YOUR_USUAL_DAILY_ACTIVITIES?: ABNORMAL
RAW_SCORE: 56
RISE FROM A CHAIR: ACTIVITY IS NOT DIFFICULT
KNEEL ON THE FRONT OF YOUR KNEE: ACTIVITY IS SOMEWHAT DIFFICULT
STIFFNESS: THE SYMPTOM AFFECTS MY ACTIVITY SLIGHTLY
STIFFNESS: THE SYMPTOM AFFECTS MY ACTIVITY SLIGHTLY
HOW_WOULD_YOU_RATE_THE_CURRENT_FUNCTION_OF_YOUR_KNEE_DURING_YOUR_USUAL_DAILY_ACTIVITIES_ON_A_SCALE_FROM_0_TO_100_WITH_100_BEING_YOUR_LEVEL_OF_KNEE_FUNCTION_PRIOR_TO_YOUR_INJURY_AND_0_BEING_THE_INABILITY_TO_PERFORM_ANY_OF_YOUR_USUAL_DAILY_ACTIVITIES?: 80
GO UP STAIRS: ACTIVITY IS SOMEWHAT DIFFICULT
PAIN: I HAVE THE SYMPTOM BUT IT DOES NOT AFFECT MY ACTIVITY
LIMPING: I HAVE THE SYMPTOM BUT IT DOES NOT AFFECT MY ACTIVITY
RISE FROM A CHAIR: ACTIVITY IS NOT DIFFICULT
GIVING WAY, BUCKLING OR SHIFTING OF KNEE: I DO NOT HAVE THE SYMPTOM
SIT WITH YOUR KNEE BENT: ACTIVITY IS NOT DIFFICULT
WALK: ACTIVITY IS NOT DIFFICULT
GO DOWN STAIRS: ACTIVITY IS SOMEWHAT DIFFICULT
WALK: ACTIVITY IS NOT DIFFICULT
GIVING WAY, BUCKLING OR SHIFTING OF KNEE: I DO NOT HAVE THE SYMPTOM
STAND: ACTIVITY IS NOT DIFFICULT
SQUAT: ACTIVITY IS SOMEWHAT DIFFICULT
WEAKNESS: THE SYMPTOM AFFECTS MY ACTIVITY SLIGHTLY
GO UP STAIRS: ACTIVITY IS SOMEWHAT DIFFICULT
LIMPING: I HAVE THE SYMPTOM BUT IT DOES NOT AFFECT MY ACTIVITY
SWELLING: I DO NOT HAVE THE SYMPTOM
AS_A_RESULT_OF_YOUR_KNEE_INJURY,_HOW_WOULD_YOU_RATE_YOUR_CURRENT_LEVEL_OF_DAILY_ACTIVITY?: NEARLY NORMAL
KNEE_ACTIVITY_OF_DAILY_LIVING_SUM: 56
HOW_WOULD_YOU_RATE_THE_OVERALL_FUNCTION_OF_YOUR_KNEE_DURING_YOUR_USUAL_DAILY_ACTIVITIES?: ABNORMAL
GO DOWN STAIRS: ACTIVITY IS SOMEWHAT DIFFICULT
KNEE_ACTIVITY_OF_DAILY_LIVING_SCORE: 80
AS_A_RESULT_OF_YOUR_KNEE_INJURY,_HOW_WOULD_YOU_RATE_YOUR_CURRENT_LEVEL_OF_DAILY_ACTIVITY?: NEARLY NORMAL
PLEASE_INDICATE_YOR_PRIMARY_REASON_FOR_REFERRAL_TO_THERAPY:: KNEE
STAND: ACTIVITY IS NOT DIFFICULT

## 2025-08-11 ENCOUNTER — TELEPHONE (OUTPATIENT)
Dept: FAMILY MEDICINE | Facility: CLINIC | Age: 45
End: 2025-08-11
Payer: COMMERCIAL

## 2025-08-15 ENCOUNTER — MYC MEDICAL ADVICE (OUTPATIENT)
Dept: FAMILY MEDICINE | Facility: CLINIC | Age: 45
End: 2025-08-15
Payer: COMMERCIAL

## 2025-08-15 DIAGNOSIS — G47.33 OSA (OBSTRUCTIVE SLEEP APNEA): Primary | ICD-10-CM

## 2025-08-15 DIAGNOSIS — E66.812 CLASS 2 SEVERE OBESITY DUE TO EXCESS CALORIES WITH SERIOUS COMORBIDITY AND BODY MASS INDEX (BMI) OF 36.0 TO 36.9 IN ADULT (H): ICD-10-CM

## 2025-08-15 DIAGNOSIS — I10 ESSENTIAL HYPERTENSION, BENIGN: ICD-10-CM

## 2025-08-15 DIAGNOSIS — E66.01 CLASS 2 SEVERE OBESITY DUE TO EXCESS CALORIES WITH SERIOUS COMORBIDITY AND BODY MASS INDEX (BMI) OF 36.0 TO 36.9 IN ADULT (H): ICD-10-CM
